# Patient Record
Sex: MALE | Race: WHITE | NOT HISPANIC OR LATINO | Employment: OTHER | URBAN - METROPOLITAN AREA
[De-identification: names, ages, dates, MRNs, and addresses within clinical notes are randomized per-mention and may not be internally consistent; named-entity substitution may affect disease eponyms.]

---

## 2017-03-08 ENCOUNTER — HOSPITAL ENCOUNTER (OUTPATIENT)
Dept: RADIOLOGY | Facility: MEDICAL CENTER | Age: 67
End: 2017-03-08
Attending: UROLOGY
Payer: MEDICARE

## 2017-03-08 DIAGNOSIS — N20.0 CALCULUS OF KIDNEY: ICD-10-CM

## 2017-03-08 PROCEDURE — 74176 CT ABD & PELVIS W/O CONTRAST: CPT

## 2017-06-08 ENCOUNTER — HOSPITAL ENCOUNTER (OUTPATIENT)
Dept: RADIOLOGY | Facility: MEDICAL CENTER | Age: 67
End: 2017-06-08
Attending: PHYSICIAN ASSISTANT
Payer: MEDICARE

## 2017-06-08 DIAGNOSIS — R31.0 GROSS HEMATURIA: ICD-10-CM

## 2017-06-08 PROCEDURE — 700117 HCHG RX CONTRAST REV CODE 255: Performed by: PHYSICIAN ASSISTANT

## 2017-06-08 PROCEDURE — 74178 CT ABD&PLV WO CNTR FLWD CNTR: CPT

## 2017-06-08 RX ADMIN — IOHEXOL 100 ML: 350 INJECTION, SOLUTION INTRAVENOUS at 16:13

## 2018-01-02 DIAGNOSIS — Z01.812 PRE-OPERATIVE LABORATORY EXAMINATION: ICD-10-CM

## 2018-01-02 DIAGNOSIS — Z01.810 PRE-OPERATIVE CARDIOVASCULAR EXAMINATION: ICD-10-CM

## 2018-01-02 LAB
ANION GAP SERPL CALC-SCNC: 8 MMOL/L (ref 0–11.9)
BUN SERPL-MCNC: 13 MG/DL (ref 8–22)
CALCIUM SERPL-MCNC: 9.3 MG/DL (ref 8.5–10.5)
CHLORIDE SERPL-SCNC: 106 MMOL/L (ref 96–112)
CO2 SERPL-SCNC: 24 MMOL/L (ref 20–33)
CREAT SERPL-MCNC: 1.04 MG/DL (ref 0.5–1.4)
EKG IMPRESSION: NORMAL
GFR SERPL CREATININE-BSD FRML MDRD: >60 ML/MIN/1.73 M 2
GLUCOSE SERPL-MCNC: 121 MG/DL (ref 65–99)
POTASSIUM SERPL-SCNC: 3.5 MMOL/L (ref 3.6–5.5)
SODIUM SERPL-SCNC: 138 MMOL/L (ref 135–145)

## 2018-01-02 PROCEDURE — 80048 BASIC METABOLIC PNL TOTAL CA: CPT

## 2018-01-02 PROCEDURE — 93010 ELECTROCARDIOGRAM REPORT: CPT | Performed by: INTERNAL MEDICINE

## 2018-01-02 PROCEDURE — 93005 ELECTROCARDIOGRAM TRACING: CPT

## 2018-01-02 PROCEDURE — 36415 COLL VENOUS BLD VENIPUNCTURE: CPT

## 2018-01-02 RX ORDER — MINERAL OIL AND WHITE PETROLATUM 30; 940 MG/G; MG/G
OINTMENT OPHTHALMIC
COMMUNITY
End: 2022-11-14

## 2018-01-02 RX ORDER — BRIMONIDINE TARTRATE AND TIMOLOL MALEATE 2; 5 MG/ML; MG/ML
1 SOLUTION OPHTHALMIC
COMMUNITY
End: 2022-11-14

## 2018-01-02 RX ORDER — SODIUM CHLORIDE 5 %
OINTMENT (GRAM) OPHTHALMIC (EYE) PRN
COMMUNITY
End: 2022-11-14

## 2018-01-30 ENCOUNTER — HOSPITAL ENCOUNTER (OUTPATIENT)
Facility: MEDICAL CENTER | Age: 68
End: 2018-01-30
Attending: OPHTHALMOLOGY | Admitting: OPHTHALMOLOGY
Payer: MEDICARE

## 2018-01-30 VITALS
HEIGHT: 68 IN | HEART RATE: 77 BPM | OXYGEN SATURATION: 93 % | SYSTOLIC BLOOD PRESSURE: 119 MMHG | DIASTOLIC BLOOD PRESSURE: 72 MMHG | RESPIRATION RATE: 20 BRPM | WEIGHT: 199.96 LBS | TEMPERATURE: 97.3 F | BODY MASS INDEX: 30.31 KG/M2

## 2018-01-30 PROCEDURE — A6410 STERILE EYE PAD: HCPCS | Performed by: OPHTHALMOLOGY

## 2018-01-30 PROCEDURE — 160035 HCHG PACU - 1ST 60 MINS PHASE I: Performed by: OPHTHALMOLOGY

## 2018-01-30 PROCEDURE — 160041 HCHG SURGERY MINUTES - EA ADDL 1 MIN LEVEL 4: Performed by: OPHTHALMOLOGY

## 2018-01-30 PROCEDURE — 700111 HCHG RX REV CODE 636 W/ 250 OVERRIDE (IP)

## 2018-01-30 PROCEDURE — 700101 HCHG RX REV CODE 250

## 2018-01-30 PROCEDURE — 501425 HCHG SPONGE, WECKCEL SPEAR: Performed by: OPHTHALMOLOGY

## 2018-01-30 PROCEDURE — 502000 HCHG MISC OR IMPLANTS RC 0278: Performed by: OPHTHALMOLOGY

## 2018-01-30 PROCEDURE — 87102 FUNGUS ISOLATION CULTURE: CPT

## 2018-01-30 PROCEDURE — 160029 HCHG SURGERY MINUTES - 1ST 30 MINS LEVEL 4: Performed by: OPHTHALMOLOGY

## 2018-01-30 PROCEDURE — 160036 HCHG PACU - EA ADDL 30 MINS PHASE I: Performed by: OPHTHALMOLOGY

## 2018-01-30 PROCEDURE — 160002 HCHG RECOVERY MINUTES (STAT): Performed by: OPHTHALMOLOGY

## 2018-01-30 PROCEDURE — 160048 HCHG OR STATISTICAL LEVEL 1-5: Performed by: OPHTHALMOLOGY

## 2018-01-30 PROCEDURE — 500558 HCHG EYE SHIELD W/GARTER (FOX): Performed by: OPHTHALMOLOGY

## 2018-01-30 PROCEDURE — 160009 HCHG ANES TIME/MIN: Performed by: OPHTHALMOLOGY

## 2018-01-30 RX ORDER — TETRACAINE HYDROCHLORIDE 5 MG/ML
SOLUTION OPHTHALMIC
Status: DISCONTINUED | OUTPATIENT
Start: 2018-01-30 | End: 2018-01-30 | Stop reason: HOSPADM

## 2018-01-30 RX ORDER — SODIUM CHLORIDE, SODIUM LACTATE, POTASSIUM CHLORIDE, CALCIUM CHLORIDE 600; 310; 30; 20 MG/100ML; MG/100ML; MG/100ML; MG/100ML
INJECTION, SOLUTION INTRAVENOUS CONTINUOUS
Status: DISCONTINUED | OUTPATIENT
Start: 2018-01-30 | End: 2018-01-30 | Stop reason: HOSPADM

## 2018-01-30 RX ORDER — MOXIFLOXACIN 5 MG/ML
SOLUTION/ DROPS OPHTHALMIC
Status: DISCONTINUED
Start: 2018-01-30 | End: 2018-01-30 | Stop reason: HOSPADM

## 2018-01-30 ASSESSMENT — PAIN SCALES - GENERAL
PAINLEVEL_OUTOF10: 0

## 2018-01-30 NOTE — DISCHARGE INSTRUCTIONS
ACTIVITY: Rest and take it easy for the first 24 hours.  A responsible adult is recommended to remain with you during that time.  It is normal to feel sleepy.  We encourage you to not do anything that requires balance, judgment or coordination.    MILD FLU-LIKE SYMPTOMS ARE NORMAL. YOU MAY EXPERIENCE GENERALIZED MUSCLE ACHES, THROAT IRRITATION, HEADACHE AND/OR SOME NAUSEA.    FOR 24 HOURS DO NOT:  Drive, operate machinery or run household appliances.  Drink beer or alcoholic beverages.   Make important decisions or sign legal documents.    SPECIAL INSTRUCTIONS: continue medications as previously ordered  No bending, straining, stooping, or lifting until  Approved by Dr Maxwell  Try to lie flat until post op appointment    DIET: To avoid nausea, slowly advance diet as tolerated, avoiding spicy or greasy foods for the first day.  Add more substantial food to your diet according to your physician's instructions.  Babies can be fed formula or breast milk as soon as they are hungry.  INCREASE FLUIDS AND FIBER TO AVOID CONSTIPATION.    SURGICAL DRESSING/BATHING: **keep patch on until post op appt*    FOLLOW-UP APPOINTMENT:  A follow-up appointment should be arranged with your doctor in *call for f/u**; call to schedule.    You should CALL YOUR PHYSICIAN if you develop:  Fever greater than 101 degrees F.  Pain not relieved by medication, or persistent nausea or vomiting.  Excessive bleeding (blood soaking through dressing) or unexpected drainage from the wound.  Extreme redness or swelling around the incision site, drainage of pus or foul smelling drainage.  Inability to urinate or empty your bladder within 8 hours.  Problems with breathing or chest pain.    You should call 911 if you develop problems with breathing or chest pain.  If you are unable to contact your doctor or surgical center, you should go to the nearest emergency room or urgent care center.  Physician's telephone #: *  Dr Maxwell [phone]**    If any  questions arise, call your doctor.  If your doctor is not available, please feel free to call the Surgical Center at [phone].  The Center is open Monday through Friday from 7AM to 7PM.  You can also call the HEALTH HOTLINE open 24 hours/day, 7 days/week and speak to a nurse at   [phone], or toll free at [phone].    A registered nurse may call you a few days after your surgery to see how you are doing after your procedure.    MEDICATIONS: Resume taking daily medication.  Take prescribed pain medication with food.  If no medication is prescribed, you may take non-aspirin pain medication if needed.  PAIN MEDICATION CAN BE VERY CONSTIPATING.  Take a stool softener or laxative such as senokot, pericolace, or milk of magnesia if needed.    Prescription given for *none**.  Last pain medication given at ***.    If your physician has prescribed pain medication that includes Acetaminophen (Tylenol), do not take additional Acetaminophen (Tylenol) while taking the prescribed medication.    Depression / Suicide Risk    As you are discharged from this [facility] facility, it is important to learn how to keep safe from harming yourself.    Recognize the warning signs:  · Abrupt changes in personality, positive or negative- including increase in energy   · Giving away possessions  · Change in eating patterns- significant weight changes-  positive or negative  · Change in sleeping patterns- unable to sleep or sleeping all the time   · Unwillingness or inability to communicate  · Depression  · Unusual sadness, discouragement and loneliness  · Talk of wanting to die  · Neglect of personal appearance   · Rebelliousness- reckless behavior  · Withdrawal from people/activities they love  · Confusion- inability to concentrate     If you or a loved one observes any of these behaviors or has concerns about self-harm, here's what you can do:  · Talk about it- your feelings and reasons for harming yourself  · Remove any  means that you might use to hurt yourself (examples: pills, rope, extension cords, firearm)  · Get professional help from the community (Mental Health, Substance Abuse, psychological counseling)  · Do not be alone:Call your Safe Contact- someone whom you trust who will be there for you.  · Call your local CRISIS HOTLINE 985-1660 or 719-635-5297  · Call your local Children's Mobile Crisis Response Team [place] (276) 876-2236 or www.WillCall  · Call the toll free National Suicide Prevention Hotlines   · National Suicide Prevention Lifeline 877-870-HFGR (8918)  · National Hope Line Network 800-SUICIDE (656-7598)

## 2018-01-30 NOTE — OR NURSING
1140 received from the OR, report form Dr Stafford, s/p stripping keratoplasty, asleep with OA, breathing unlabored & clear, right eye patched & shielded, pt laying supine for 60 mins,   1214 OA d/c'd, remains very sleepy  1230: pt remains asleep at this time,   1250: pt's hour to lay flat is up, pt sitting up in bed, remains very drowsy, given sips of water, tolerating well,   1315 iv d/cd'd, instructions given, home via w/c

## 2018-02-02 NOTE — OP REPORT
DATE OF SERVICE:  01/30/2018    PROCEDURE PERFORMED:  1.  Descemet stripping endothelial keratoplasty, right eye.  2.  Keratectomy, right eye.    PREOPERATIVE DIAGNOSES:+  1.  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  2.  Superficial corneal scarring or nodularity, right eye.    POSTOPERATIVE DIAGNOSES:  1.  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  2.  Superficial corneal scarring or nodularity, right eye.    INDICATIONS FOR SURGERY:  This is a patient with previous cataract and   glaucoma procedures with subsequent decompensation of his cornea with edema as   well as Salzmann's nodularity superiorly versus hypertrophic scar requiring   combined keratectomy with endothelial replacement.    ANESTHESIA:  General endotracheal anesthesia.    COMPLICATIONS:  None.    BLOOD LOSS:  Minimal.    SPECIMENS:  Donor corneal rim for fungal culture.    DESCRIPTION OF PROCEDURE:  Risks, benefits, alternatives and indications for   surgery were reviewed with the patient as well as his legal guardian and power   of , and informed consent was obtained by way of a 3-person phone   consult, direct communication with legal guardian.  Informed consent was   obtained, the patient was brought to the operating room where the right eye   was prepped and draped and a lid speculum was placed following administrating   anesthesia.  Two paracenteses were placed near the limbus and a crescent blade   was then used to smooth the superior cornea with a keratectomy to remove   hyperplastic tissue as best as possible without creating too much of a surface   irregularity.  After this, the anterior chamber was filled with Healon,   followed by a temporal clear corneal incision that was made.  A reverse   Sinskey hook was then used to remove the endothelium and Descemet at a   diameter of 8.0 mm and the Healon was removed from the anterior chamber.  An   inferior iridotomy was also created using a 30-gauge needle and a reverse   Sinskey  robe.  Attention was then brought to the donor tissue, which was a   presectioned tissue for [facility] surgery from the [facility].    This tissue was held on a suction punch and cut to a diameter of 8.0 mm and   then loaded into a mini-Busin glide, which was then introduced into the eye   and was brought in place with insertion forceps.  A high pressure air fill was   used to fixate the graft on the posterior stroma.  Two interrupted 10-0 nylon   sutures were placed temporally and the corneal wound and then a high-pressure   fill elapsed for 10 minutes after which a small air-fluid exchange was   performed and the patient's eye was gently prepped and draped and the patient   was discharged in stable condition to the postanesthesia care unit with plans   for followup with Dr. Maxwell the next day in the eye clinic.       ____________________________________     MD OTILIA Doan / LINUS    DD:  02/02/2018 07:58:05  DT:  02/02/2018 09:28:39    D#:  [id number]  Job#:  [id number]

## 2018-02-26 LAB
FUNGUS SPEC CULT: NORMAL
SIGNIFICANT IND 70042: NORMAL
SITE SITE: NORMAL
SOURCE SOURCE: NORMAL

## 2018-09-27 ENCOUNTER — HOSPITAL ENCOUNTER (OUTPATIENT)
Dept: RADIOLOGY | Facility: MEDICAL CENTER | Age: 68
End: 2018-09-27
Attending: PHYSICIAN ASSISTANT
Payer: MEDICARE

## 2018-09-27 DIAGNOSIS — N20.0 URIC ACID NEPHROLITHIASIS: ICD-10-CM

## 2018-09-27 PROCEDURE — 74018 RADEX ABDOMEN 1 VIEW: CPT

## 2019-04-04 ENCOUNTER — HOSPITAL ENCOUNTER (OUTPATIENT)
Dept: RADIOLOGY | Facility: MEDICAL CENTER | Age: 69
End: 2019-04-04
Attending: UROLOGY
Payer: MEDICARE

## 2019-04-04 DIAGNOSIS — Z87.442 PERSONAL HISTORY OF URINARY CALCULI: ICD-10-CM

## 2019-04-04 PROCEDURE — 74018 RADEX ABDOMEN 1 VIEW: CPT

## 2019-04-17 ENCOUNTER — HOSPITAL ENCOUNTER (OUTPATIENT)
Dept: LAB | Facility: MEDICAL CENTER | Age: 69
End: 2019-04-17
Attending: UROLOGY
Payer: MEDICARE

## 2019-04-17 LAB
APPEARANCE UR: CLEAR
BILIRUB UR QL STRIP.AUTO: NEGATIVE
COLOR UR: YELLOW
GLUCOSE UR STRIP.AUTO-MCNC: NEGATIVE MG/DL
KETONES UR STRIP.AUTO-MCNC: NEGATIVE MG/DL
LEUKOCYTE ESTERASE UR QL STRIP.AUTO: NEGATIVE
MICRO URNS: NORMAL
NITRITE UR QL STRIP.AUTO: NEGATIVE
PH UR STRIP.AUTO: 5.5 [PH]
PROT UR QL STRIP: NEGATIVE MG/DL
RBC UR QL AUTO: NEGATIVE
SP GR UR STRIP.AUTO: 1.03
UROBILINOGEN UR STRIP.AUTO-MCNC: 0.2 MG/DL

## 2019-04-17 PROCEDURE — 81003 URINALYSIS AUTO W/O SCOPE: CPT

## 2019-04-17 PROCEDURE — 87077 CULTURE AEROBIC IDENTIFY: CPT

## 2019-04-17 PROCEDURE — 87086 URINE CULTURE/COLONY COUNT: CPT

## 2019-04-17 PROCEDURE — 87186 SC STD MICRODIL/AGAR DIL: CPT

## 2019-04-20 LAB
BACTERIA UR CULT: ABNORMAL
BACTERIA UR CULT: ABNORMAL
SIGNIFICANT IND 70042: ABNORMAL
SITE SITE: ABNORMAL
SOURCE SOURCE: ABNORMAL

## 2021-06-20 PROBLEM — I10 ESSENTIAL HYPERTENSION: Status: ACTIVE | Noted: 2021-06-20

## 2021-06-20 PROBLEM — H02.844 SWELLING OF LEFT UPPER EYELID: Status: ACTIVE | Noted: 2021-06-20

## 2021-06-20 PROBLEM — G47.39 OTHER SLEEP APNEA: Status: ACTIVE | Noted: 2021-06-20

## 2021-06-20 PROBLEM — N40.1 BENIGN PROSTATIC HYPERPLASIA WITH LOWER URINARY TRACT SYMPTOMS: Status: ACTIVE | Noted: 2021-06-20

## 2021-06-20 PROBLEM — M1A.9XX0 CHRONIC GOUT: Status: ACTIVE | Noted: 2021-06-20

## 2021-06-20 PROBLEM — D69.6 THROMBOCYTOPENIA (HCC): Status: ACTIVE | Noted: 2021-06-20

## 2021-06-20 PROBLEM — R21 RASH: Status: ACTIVE | Noted: 2021-06-20

## 2021-06-20 PROBLEM — F32.1 MAJOR DEPRESSIVE DISORDER, SINGLE EPISODE, MODERATE (HCC): Status: ACTIVE | Noted: 2021-06-20

## 2021-06-20 PROBLEM — K64.9 HEMORRHOIDS: Status: ACTIVE | Noted: 2021-06-20

## 2021-06-20 PROBLEM — N18.30 CHRONIC KIDNEY DISEASE, STAGE 3 UNSPECIFIED: Status: ACTIVE | Noted: 2021-06-20

## 2021-06-20 PROBLEM — C92.10 CHRONIC MYELOCYTIC LEUKEMIA (HCC): Status: ACTIVE | Noted: 2021-06-20

## 2021-06-20 PROBLEM — E55.9 VITAMIN D DEFICIENCY: Status: ACTIVE | Noted: 2021-06-20

## 2021-06-20 PROBLEM — R26.81 GAIT INSTABILITY: Status: ACTIVE | Noted: 2021-06-20

## 2021-06-20 PROBLEM — Z86.0100 HISTORY OF COLONIC POLYPS: Status: ACTIVE | Noted: 2021-06-20

## 2021-06-20 PROBLEM — Z99.89 CPAP (CONTINUOUS POSITIVE AIRWAY PRESSURE) DEPENDENCE: Status: ACTIVE | Noted: 2021-06-20

## 2021-06-20 PROBLEM — Z86.010 HISTORY OF COLONIC POLYPS: Status: ACTIVE | Noted: 2021-06-20

## 2021-06-20 PROBLEM — F31.9 BIPOLAR 1 DISORDER (HCC): Status: ACTIVE | Noted: 2021-06-20

## 2021-06-25 ENCOUNTER — HOSPITAL ENCOUNTER (OUTPATIENT)
Dept: RADIOLOGY | Facility: MEDICAL CENTER | Age: 71
End: 2021-06-25
Attending: OPHTHALMOLOGY
Payer: MEDICARE

## 2021-06-25 DIAGNOSIS — D48.7 NEOPLASM OF UNCERTAIN BEHAVIOR OF OTHER SPECIFIED SITES: ICD-10-CM

## 2021-06-25 DIAGNOSIS — H02.844 EDEMA OF LEFT UPPER EYELID: ICD-10-CM

## 2021-06-25 PROCEDURE — 700117 HCHG RX CONTRAST REV CODE 255: Performed by: OPHTHALMOLOGY

## 2021-06-25 PROCEDURE — 70543 MRI ORBT/FAC/NCK W/O &W/DYE: CPT | Mod: MH

## 2021-06-25 PROCEDURE — 70553 MRI BRAIN STEM W/O & W/DYE: CPT | Mod: MH

## 2021-06-25 PROCEDURE — A9576 INJ PROHANCE MULTIPACK: HCPCS | Performed by: OPHTHALMOLOGY

## 2021-06-25 RX ADMIN — GADOTERIDOL 18 ML: 279.3 INJECTION, SOLUTION INTRAVENOUS at 14:25

## 2021-07-29 PROBLEM — E66.9 CLASS 1 OBESITY: Status: ACTIVE | Noted: 2021-07-29

## 2021-07-29 PROBLEM — E66.811 CLASS 1 OBESITY: Status: ACTIVE | Noted: 2021-07-29

## 2022-06-01 PROBLEM — R05.9 COUGH: Status: ACTIVE | Noted: 2022-06-01

## 2022-06-01 PROBLEM — J18.9 PNEUMONIA: Status: ACTIVE | Noted: 2022-06-01

## 2022-06-22 ENCOUNTER — OFFICE VISIT (OUTPATIENT)
Dept: SLEEP MEDICINE | Facility: MEDICAL CENTER | Age: 72
End: 2022-06-22
Payer: MEDICARE

## 2022-06-22 VITALS
BODY MASS INDEX: 30.46 KG/M2 | HEIGHT: 68 IN | WEIGHT: 201 LBS | DIASTOLIC BLOOD PRESSURE: 82 MMHG | SYSTOLIC BLOOD PRESSURE: 124 MMHG | HEART RATE: 70 BPM | OXYGEN SATURATION: 95 %

## 2022-06-22 DIAGNOSIS — J18.9 PNEUMONIA OF LEFT LUNG DUE TO INFECTIOUS ORGANISM, UNSPECIFIED PART OF LUNG: ICD-10-CM

## 2022-06-22 DIAGNOSIS — J90 PLEURAL EFFUSION: ICD-10-CM

## 2022-06-22 DIAGNOSIS — C92.10 CHRONIC MYELOCYTIC LEUKEMIA (HCC): ICD-10-CM

## 2022-06-22 PROCEDURE — 99205 OFFICE O/P NEW HI 60 MIN: CPT | Performed by: INTERNAL MEDICINE

## 2022-06-22 RX ORDER — BACITRACIN ZINC AND POLYMYXIN B SULFATE 500; 10000 [USP'U]/G; [USP'U]/G
OINTMENT TOPICAL
COMMUNITY
Start: 2022-05-19 | End: 2022-11-14

## 2022-06-22 RX ORDER — LOPERAMIDE HYDROCHLORIDE 2 MG/1
2 CAPSULE ORAL 4 TIMES DAILY PRN
COMMUNITY
End: 2024-02-22 | Stop reason: SDUPTHER

## 2022-06-22 NOTE — PROGRESS NOTES
"Pulmonary Clinic Note    Chief Complaint:  Chief Complaint   Patient presents with   • Establish Care     Referred by Kathleen Schmidt PA-C for Pleural Effusion on left/SOB/Cough   • Other     CXR 06/01/22     HPI:   Austin Woodard is a very pleasant 71 y.o. male never smoker.  He was referred to pulmonary clinic in June 2022 by his primary care manager for discovery of a large left-sided pleural effusion on chest x-ray.  Chest x-ray was as a follow-up from an initial chest x-ray in May 2022.  In May, he was treated for community-acquired pneumonia with Augmentin and azithromycin by his PCM.  There reportedly was a mention of a pleural effusion at that time.  A repeat chest x-ray in June was read as \"large pleural effusion\".  He is completely asymptomatic today.  He is saturating 95% on room air.  He is not complain of cough or shortness of breath.  He does not complain of fevers, chills, night sweats, weight changes.  No orthopnea or ankle edema.He has a history of CML and has been treated with Dasatinib since August 2021 by his oncologist Dr. Valdez.    He denies a history of working in settings with exposure to asbestos.  He has traveled around the world.  He did have a mom with a history of lung cancer but she was a smoker.  He is retired [place] .    Patient has not been seen by pulmonary in the past.  he has no prior PFTs  he has never been hospitalized for respiratory issues.       Past Medical History:   Diagnosis Date   • Bowel habit changes    • Glaucoma    • Hypertension    • Psychiatric problem     severe depression   • Renal disorder     hx of kidney stones   • Shortness of breath    • Unspecified cataract     IOL   • Urinary bladder disorder     kidney stones   • Urinary incontinence 01/02/2018    on occasion       Past Surgical History:   Procedure Laterality Date   • PENETRATING KERATOPLASTY Right 1/30/2018    Procedure: DESCEMETS STRIPPING ENDOTHELIAL KERATOPLASTY AND KERATECTOMY;  " Surgeon: Gabe Maxwell M.D.;  Location: SURGERY SAME DAY [facility];  Service: Ophthalmology   • BAERVELDT IMPLANT Right 9/4/2015    Procedure: BAERVELDT VALVE W/DONOR SCLERA PATCH GRAFT;  Surgeon: Demond Alonso M.D.;  Location: SURGERY SAME DAY [facility];  Service:    • CATARACT PHACO WITH IOL     • IMPLANT GLAUCOMA BAERVELDT      x 2   • OTHER      skin graft right heel   • OTHER ORTHOPEDIC SURGERY     • ROTATOR CUFF REPAIR         Social History     Socioeconomic History   • Marital status:      Spouse name: Not on file   • Number of children: Not on file   • Years of education: Not on file   • Highest education level: Not on file   Occupational History   • Not on file   Tobacco Use   • Smoking status: Never Smoker   • Smokeless tobacco: Never Used   Vaping Use   • Vaping Use: Never used   Substance and Sexual Activity   • Alcohol use: No   • Drug use: No   • Sexual activity: Not on file   Other Topics Concern   • Not on file   Social History Narrative   • Not on file     Social Determinants of Health     Financial Resource Strain: Not on file   Food Insecurity: Not on file   Transportation Needs: Not on file   Physical Activity: Not on file   Stress: Not on file   Social Connections: Not on file   Intimate Partner Violence: Not on file   Housing Stability: Not on file          History reviewed. No pertinent family history.    Current Outpatient Medications on File Prior to Visit   Medication Sig Dispense Refill   • Bacillus Coagulans-Inulin (PROBIOTIC FORMULA) 1-250 BILLION-MG Cap      • Lidocaine-Glycerin (PREPARATION H EX) Apply  topically.     • loperamide (IMODIUM) 2 MG Cap Take 2 mg by mouth 4 times a day as needed for Diarrhea.     • niacin 500 MG Tab TAKE 1 TABLET BY MOUTH ONCE DAILY (PM PER FACILITY) 30 Tablet 10   • docusate sodium (COLACE) 100 MG Cap TAKE 1 CAPSULE BY MOUTH ONCE DAILY 30 Capsule 10   • polyethylene glycol/lytes (MIRALAX) 17 g Pack Take 1 Packet by mouth 1 time a day  as needed (for no BM in 24 hours). 30 Each 5   • Dextromethorphan-guaiFENesin (TUSSIN DM)  MG/5ML Syrup Take 10 mL by mouth 3 times a day as needed (For cough or congestion). 236 mL 0   • loperamide (IMODIUM) 2 MG Cap May take 2 Capsules by mouth 1 time a day as needed for Diarrhea. May also take 1 Capsule 3 times a day as needed. Give 2 tablets for first episode of diarrhea, 1 tablet with each additional episode up to a total of 8mg daily. 30 Capsule 1   • allopurinol (ZYLOPRIM) 300 MG Tab Take 1 Tablet by mouth every day. 30 Tablet 5   • PHOSPHA 250 NEUTRAL 155-852-130 MG tablet Take 1 Tablet by mouth 2 times a day.     • Cholecalciferol (VITAMIN D) 2000 UNIT Tab TAKE 1 TABLET BY MOUTH ONCE DAILY (PM) (Patient taking differently: 50mcg) 30 Tablet 10   • chlorhexidine (PERIDEX) 0.12 % Solution SWISH AND SPIT 15MLS BY MOUTH TWICE DAILY 473 mL 10   • ARIPiprazole (ABILIFY) 10 MG Tab TAKE 1 TABLET BY MOUTH ONCE DAILY 30 Tablet 11   • tamsulosin (FLOMAX) 0.4 MG capsule TAKE 1 CAPSULE BY MOUTH ONCE DAILY (PM) 30 Capsule 10   • triamcinolone acetonide (KENALOG) 0.1 % Cream APPLY A SMALL AMOUNT TO AFFECTED AREA TWICE A DAY AS NEEDED FOR RASH. DO NOT EXCEED 14 DAYS 80 g 11   • aspirin (ASA) 81 MG Chew Tab chewable tablet Chew 1 Tablet every day. 100 Tablet 3   • Dasatinib (SPRYCEL) 100 MG Tab Take 1 tablet by mouth every day.     • Brimonidine Tartrate-Timolol 0.2-0.5 % Solution 1 Drop by Ophthalmic route 2 Times a Day. Each eye BID     • White Petrolatum-Mineral Oil (SYSTANE NIGHTTIME) Ointment by Ophthalmic route every bedtime.     • sodium chloride 5 % ophthalmic ointment Place  in both eyes as needed.     • artificial tears 1.4 % Solution Place 1 Drop in both eyes 4 times a day.     • mag hydrox-al hydrox-simeth (MAALOX PLUS ES OR MYLANTA DS) 400-400-40 MG/5ML Suspension Take 10 mL by mouth 4 times a day as needed.     • ondansetron (ZOFRAN ODT) 4 MG TABLET DISPERSIBLE Take 4 mg by mouth every 6 hours as needed  "for Nausea/Vomiting.     • L-Methylfolate 15 MG Tab Take 1 Capsule by mouth every day.     • latanoprost (XALATAN) 0.005 % Solution Administer 1 Drop into the right eye every evening.     • brimonidine (ALPHAGAN P) 0.1 % Solution 1 Drop 2 Times a Day.     • dorzolamide (TRUSOPT) 2 % Solution Administer 1 Drop into the right eye 2 times a day.       No current facility-administered medications on file prior to visit.       Allergies: Celexa and Zoloft      ROS: A 12 point ROS was performed on intake and during my interview. ROS negative unless specifically noted in HPI.     Vitals:  /82 (BP Location: Left arm, Patient Position: Sitting, BP Cuff Size: Adult)   Pulse 70   Ht 1.727 m (5' 8\")   Wt 91.2 kg (201 lb)   SpO2 95%     Physical Exam:  Physical Exam  Constitutional:       General: He is not in acute distress.     Appearance: He is not ill-appearing, toxic-appearing or diaphoretic.   HENT:      Head: Normocephalic.   Eyes:      Conjunctiva/sclera:      Right eye: Right conjunctiva is injected.      Left eye: Left conjunctiva is injected.      Pupils: Pupils are equal, round, and reactive to light.   Cardiovascular:      Rate and Rhythm: Normal rate and regular rhythm.      Heart sounds:    No systolic murmur is present.  Pulmonary:      Effort: Pulmonary effort is normal. No respiratory distress.      Breath sounds: Normal breath sounds. No wheezing, rhonchi or rales.       Musculoskeletal:      Right lower leg: No edema.      Left lower leg: No edema.   Neurological:      Mental Status: He is alert.   Psychiatric:         Mood and Affect: Affect is flat.         Behavior: Behavior is slowed. Behavior is cooperative.         Thought Content: Thought content normal.         Judgment: Judgment normal.         Laboratory Data: I personally reviewed labs including historical lab trends.       PFTs as reviewed by me personally show:    Imaging as reviewed by me personally show:  " "    Assessment/Plan:    Problem List Items Addressed This Visit     Chronic myelocytic leukemia (HCC)    Pneumonia    Pleural effusion        Plan/recommendations:  Unfortunately, I am unable to see a report or an image for the reported May chest x-ray.  I am able to see a report from the June x-ray but unable to personally review the film.  Given that the effusion was described as \"large\" it would be preferable that we are able to access these images.  This is especially important since there was apparently a report that perhaps the effusion got larger from May to June.      I discussed this with Mr. Woodard, his daughter (via phone), Lisa ([facility] services), and his caregiver. The differential diagnosis is broad but the most likely etiologies are Dasatanib-related (incidence of 5-28% per UpToDate), malignancy, autoimmune.     I doubt heart failure as it is described as unilateral and he does not have orthopnea or leg edema.  I doubt infectious as he is nontoxic-appearing and asymptomatic.      The only way to really narrow down the differential would be to do a thoracentesis and send fluid for studies.  I went over this option with everyone.  The other option would be to discuss Dasatinib with his oncologist and consider taking him off this drug for 8 to 12 weeks and repeating a chest x-ray to see if the effusion resolves.  If the effusion resolves this is very likely the culprit. Mr. Woodard stated that his preference is to stop the Dasatinib and see if the effusion gets better.  He stated he would not like to get a thoracentesis at this time.  I do think this is a reasonable option.    I have asked his caregivers to obtain copies of the images from the chest x-rays and to have them uploaded into [facility]'s radiology system.  We will also have my office ask for those copies.  I explained that I will be traveling next week so if they are able to get the images this week I will be able to call them back.  If I am " unable to call them back, they can ask for one of the covering providers in our clinic to review the images and discussed them with Mr. Woodard and his caregivers.       Return in about 3 months (around 9/22/2022). After repeat CXR. Sooner if symptomatic.    __________  Eliceo Harden, DO  Pulmonary and Critical Care Medicine  [facility]    Please note that this dictation was created using voice recognition software. The accuracy of the dictation is limited to the abilities of the software. I have made every reasonable attempt to correct obvious errors, but I expect that there are errors of grammar and possibly content that I did not discover before finalizing the note.

## 2022-06-22 NOTE — PATIENT INSTRUCTIONS
"Please bring CDs with Mr. Woodard's May and June chest Xrays to the [place] radiology department to be uploaded.  2.   Please notify our clinic when the images have been uploaded so that Dr. Harden or one of the other pulmonary providers can review the films and get back to you about the findings.   3.   Please ask Dr. Valdez (oncology) to consider stopping the Desatinib (Sprycel) as this has an incidence of pleural effusion of 5-28%. If this is stopped, I would recommend repeat chest xray within 8-12 weeks to see if the effusion has resolved on its own.   4.   If the effusion does not resolve on it's own or if Mr. Woodard becomes symptomatic (shortness of breath, cough, etc) he would need a referral to interventional radiology for a thoracentesis (\"tap\" of the fluid).   "

## 2022-08-05 ENCOUNTER — HOSPITAL ENCOUNTER (OUTPATIENT)
Dept: RADIOLOGY | Facility: MEDICAL CENTER | Age: 72
End: 2022-08-05
Attending: INTERNAL MEDICINE
Payer: MEDICARE

## 2022-08-05 DIAGNOSIS — D69.6 THROMBOCYTOPENIA, UNSPECIFIED (HCC): ICD-10-CM

## 2022-08-05 DIAGNOSIS — C93.10 CHRONIC MYELOMONOCYTIC LEUKEMIA NOT HAVING ACHIEVED REMISSION (HCC): ICD-10-CM

## 2022-08-05 DIAGNOSIS — D75.1 SECONDARY POLYCYTHEMIA: ICD-10-CM

## 2022-08-05 DIAGNOSIS — Z79.899 LONG TERM USE OF DRUG: ICD-10-CM

## 2022-08-05 DIAGNOSIS — D72.829 LEUKOCYTOSIS, UNSPECIFIED TYPE: ICD-10-CM

## 2022-08-05 PROCEDURE — 71046 X-RAY EXAM CHEST 2 VIEWS: CPT

## 2022-10-05 ENCOUNTER — HOSPITAL ENCOUNTER (OUTPATIENT)
Dept: RADIOLOGY | Facility: MEDICAL CENTER | Age: 72
End: 2022-10-05
Attending: INTERNAL MEDICINE
Payer: MEDICARE

## 2022-10-05 DIAGNOSIS — Z79.899 NEED FOR PROPHYLACTIC CHEMOTHERAPY: ICD-10-CM

## 2022-10-05 DIAGNOSIS — D75.1 POLYCYTHEMIA, SECONDARY: ICD-10-CM

## 2022-10-05 DIAGNOSIS — D72.10 EOSINOPHILIA, UNSPECIFIED TYPE: ICD-10-CM

## 2022-10-05 DIAGNOSIS — D69.6 THROMBOCYTOPENIA, UNSPECIFIED (HCC): ICD-10-CM

## 2022-10-05 DIAGNOSIS — C93.10 CHRONIC MONOCYTOID LEUKEMIA (HCC): ICD-10-CM

## 2022-10-05 PROCEDURE — 71046 X-RAY EXAM CHEST 2 VIEWS: CPT

## 2022-11-10 ENCOUNTER — PATIENT MESSAGE (OUTPATIENT)
Dept: HEALTH INFORMATION MANAGEMENT | Facility: OTHER | Age: 72
End: 2022-11-10

## 2023-01-12 ENCOUNTER — APPOINTMENT (OUTPATIENT)
Dept: RADIOLOGY | Facility: MEDICAL CENTER | Age: 73
End: 2023-01-12
Attending: INTERNAL MEDICINE
Payer: MEDICARE

## 2023-01-12 DIAGNOSIS — Z79.899 NEED FOR PROPHYLACTIC CHEMOTHERAPY: ICD-10-CM

## 2023-01-12 DIAGNOSIS — D69.6 THROMBOCYTOPENIA, UNSPECIFIED (HCC): ICD-10-CM

## 2023-01-12 DIAGNOSIS — C93.10 CHRONIC MONOCYTOID LEUKEMIA (HCC): ICD-10-CM

## 2023-01-12 DIAGNOSIS — D72.10 EOSINOPHILIA, UNSPECIFIED TYPE: ICD-10-CM

## 2023-01-12 DIAGNOSIS — D75.1 POLYCYTHEMIA, SECONDARY: ICD-10-CM

## 2023-01-12 PROCEDURE — 71046 X-RAY EXAM CHEST 2 VIEWS: CPT

## 2023-10-05 ENCOUNTER — HOSPITAL ENCOUNTER (OUTPATIENT)
Dept: RADIOLOGY | Facility: MEDICAL CENTER | Age: 73
End: 2023-10-05
Attending: INTERNAL MEDICINE
Payer: MEDICARE

## 2023-10-05 DIAGNOSIS — D75.1 POLYCYTHEMIA, SECONDARY: ICD-10-CM

## 2023-10-05 DIAGNOSIS — C93.10 CHRONIC MONOCYTOID LEUKEMIA (HCC): ICD-10-CM

## 2023-10-05 DIAGNOSIS — Z79.899 LONG TERM USE OF DRUG: ICD-10-CM

## 2023-10-05 DIAGNOSIS — D69.6 THROMBOCYTOPENIA, UNSPECIFIED (HCC): ICD-10-CM

## 2023-10-05 DIAGNOSIS — D72.10 EOSINOPHILIA, UNSPECIFIED TYPE: ICD-10-CM

## 2023-10-05 PROCEDURE — 71046 X-RAY EXAM CHEST 2 VIEWS: CPT

## 2023-10-15 PROBLEM — G47.00 INSOMNIA: Status: ACTIVE | Noted: 2023-10-15

## 2023-10-16 PROBLEM — R32 URINARY INCONTINENCE: Status: ACTIVE | Noted: 2023-10-16

## 2023-10-23 ENCOUNTER — HOSPITAL ENCOUNTER (EMERGENCY)
Facility: MEDICAL CENTER | Age: 73
End: 2023-10-23
Attending: STUDENT IN AN ORGANIZED HEALTH CARE EDUCATION/TRAINING PROGRAM
Payer: MEDICARE

## 2023-10-23 ENCOUNTER — APPOINTMENT (OUTPATIENT)
Dept: RADIOLOGY | Facility: MEDICAL CENTER | Age: 73
End: 2023-10-23
Attending: STUDENT IN AN ORGANIZED HEALTH CARE EDUCATION/TRAINING PROGRAM
Payer: MEDICARE

## 2023-10-23 VITALS
RESPIRATION RATE: 15 BRPM | TEMPERATURE: 98 F | SYSTOLIC BLOOD PRESSURE: 145 MMHG | OXYGEN SATURATION: 93 % | HEART RATE: 64 BPM | DIASTOLIC BLOOD PRESSURE: 83 MMHG | BODY MASS INDEX: 33.04 KG/M2 | HEIGHT: 68 IN | WEIGHT: 218 LBS
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
## 2023-10-23 DIAGNOSIS — J18.9 PNEUMONIA OF LEFT LUNG DUE TO INFECTIOUS ORGANISM, UNSPECIFIED PART OF LUNG: ICD-10-CM

## 2023-10-23 DIAGNOSIS — S01.01XA LACERATION OF SCALP, INITIAL ENCOUNTER: ICD-10-CM

## 2023-10-23 LAB
ALBUMIN SERPL BCP-MCNC: 4.3 G/DL (ref 3.2–4.9)
ALBUMIN/GLOB SERPL: 1.2 G/DL
ALP SERPL-CCNC: 95 U/L (ref 30–99)
ALT SERPL-CCNC: 21 U/L (ref 2–50)
ANION GAP SERPL CALC-SCNC: 10 MMOL/L (ref 7–16)
AST SERPL-CCNC: 25 U/L (ref 12–45)
BASOPHILS # BLD AUTO: 0.4 % (ref 0–1.8)
BASOPHILS # BLD: 0.03 K/UL (ref 0–0.12)
BILIRUB SERPL-MCNC: 0.9 MG/DL (ref 0.1–1.5)
BUN SERPL-MCNC: 18 MG/DL (ref 8–22)
CALCIUM ALBUM COR SERPL-MCNC: 9.2 MG/DL (ref 8.5–10.5)
CALCIUM SERPL-MCNC: 9.4 MG/DL (ref 8.5–10.5)
CHLORIDE SERPL-SCNC: 105 MMOL/L (ref 96–112)
CO2 SERPL-SCNC: 26 MMOL/L (ref 20–33)
CREAT SERPL-MCNC: 1.31 MG/DL (ref 0.5–1.4)
EKG IMPRESSION: NORMAL
EOSINOPHIL # BLD AUTO: 0.32 K/UL (ref 0–0.51)
EOSINOPHIL NFR BLD: 4 % (ref 0–6.9)
ERYTHROCYTE [DISTWIDTH] IN BLOOD BY AUTOMATED COUNT: 53.5 FL (ref 35.9–50)
ETHANOL BLD-MCNC: <10.1 MG/DL
FLUAV RNA SPEC QL NAA+PROBE: NEGATIVE
FLUBV RNA SPEC QL NAA+PROBE: NEGATIVE
GFR SERPLBLD CREATININE-BSD FMLA CKD-EPI: 58 ML/MIN/1.73 M 2
GLOBULIN SER CALC-MCNC: 3.5 G/DL (ref 1.9–3.5)
GLUCOSE SERPL-MCNC: 116 MG/DL (ref 65–99)
HCT VFR BLD AUTO: 48.5 % (ref 42–52)
HGB BLD-MCNC: 16.2 G/DL (ref 14–18)
IMM GRANULOCYTES # BLD AUTO: 0.04 K/UL (ref 0–0.11)
IMM GRANULOCYTES NFR BLD AUTO: 0.5 % (ref 0–0.9)
LYMPHOCYTES # BLD AUTO: 2.47 K/UL (ref 1–4.8)
LYMPHOCYTES NFR BLD: 31 % (ref 22–41)
MCH RBC QN AUTO: 31.1 PG (ref 27–33)
MCHC RBC AUTO-ENTMCNC: 33.4 G/DL (ref 32.3–36.5)
MCV RBC AUTO: 93.1 FL (ref 81.4–97.8)
MONOCYTES # BLD AUTO: 0.98 K/UL (ref 0–0.85)
MONOCYTES NFR BLD AUTO: 12.3 % (ref 0–13.4)
NEUTROPHILS # BLD AUTO: 4.14 K/UL (ref 1.82–7.42)
NEUTROPHILS NFR BLD: 51.8 % (ref 44–72)
NRBC # BLD AUTO: 0 K/UL
NRBC BLD-RTO: 0 /100 WBC (ref 0–0.2)
NT-PROBNP SERPL IA-MCNC: 159 PG/ML (ref 0–125)
PLATELET # BLD AUTO: 161 K/UL (ref 164–446)
PMV BLD AUTO: 8.9 FL (ref 9–12.9)
POTASSIUM SERPL-SCNC: 4.2 MMOL/L (ref 3.6–5.5)
PROT SERPL-MCNC: 7.8 G/DL (ref 6–8.2)
RBC # BLD AUTO: 5.21 M/UL (ref 4.7–6.1)
RSV RNA SPEC QL NAA+PROBE: NEGATIVE
SARS-COV-2 RNA RESP QL NAA+PROBE: NOTDETECTED
SODIUM SERPL-SCNC: 141 MMOL/L (ref 135–145)
SPECIMEN SOURCE: NORMAL
TROPONIN T SERPL-MCNC: 27 NG/L (ref 6–19)
WBC # BLD AUTO: 8 K/UL (ref 4.8–10.8)

## 2023-10-23 PROCEDURE — 83880 ASSAY OF NATRIURETIC PEPTIDE: CPT

## 2023-10-23 PROCEDURE — 305308 HCHG STAPLER,SKIN,DISP.

## 2023-10-23 PROCEDURE — 72125 CT NECK SPINE W/O DYE: CPT

## 2023-10-23 PROCEDURE — 71045 X-RAY EXAM CHEST 1 VIEW: CPT

## 2023-10-23 PROCEDURE — 700102 HCHG RX REV CODE 250 W/ 637 OVERRIDE(OP): Performed by: STUDENT IN AN ORGANIZED HEALTH CARE EDUCATION/TRAINING PROGRAM

## 2023-10-23 PROCEDURE — 82077 ASSAY SPEC XCP UR&BREATH IA: CPT

## 2023-10-23 PROCEDURE — 304999 HCHG REPAIR-SIMPLE/INTERMED LEVEL 1

## 2023-10-23 PROCEDURE — A9270 NON-COVERED ITEM OR SERVICE: HCPCS | Performed by: STUDENT IN AN ORGANIZED HEALTH CARE EDUCATION/TRAINING PROGRAM

## 2023-10-23 PROCEDURE — 304217 HCHG IRRIGATION SYSTEM

## 2023-10-23 PROCEDURE — 700111 HCHG RX REV CODE 636 W/ 250 OVERRIDE (IP): Performed by: STUDENT IN AN ORGANIZED HEALTH CARE EDUCATION/TRAINING PROGRAM

## 2023-10-23 PROCEDURE — 70450 CT HEAD/BRAIN W/O DYE: CPT

## 2023-10-23 PROCEDURE — 80053 COMPREHEN METABOLIC PANEL: CPT

## 2023-10-23 PROCEDURE — 99285 EMERGENCY DEPT VISIT HI MDM: CPT

## 2023-10-23 PROCEDURE — 93005 ELECTROCARDIOGRAM TRACING: CPT | Performed by: STUDENT IN AN ORGANIZED HEALTH CARE EDUCATION/TRAINING PROGRAM

## 2023-10-23 PROCEDURE — 85025 COMPLETE CBC W/AUTO DIFF WBC: CPT

## 2023-10-23 PROCEDURE — C9803 HOPD COVID-19 SPEC COLLECT: HCPCS | Performed by: STUDENT IN AN ORGANIZED HEALTH CARE EDUCATION/TRAINING PROGRAM

## 2023-10-23 PROCEDURE — 0241U HCHG SARS-COV-2 COVID-19 NFCT DS RESP RNA 4 TRGT MIC: CPT

## 2023-10-23 PROCEDURE — 84484 ASSAY OF TROPONIN QUANT: CPT

## 2023-10-23 PROCEDURE — 700105 HCHG RX REV CODE 258: Performed by: STUDENT IN AN ORGANIZED HEALTH CARE EDUCATION/TRAINING PROGRAM

## 2023-10-23 RX ORDER — DOXYCYCLINE 100 MG/1
100 CAPSULE ORAL 2 TIMES DAILY
Qty: 10 CAPSULE | Refills: 0 | Status: ACTIVE | OUTPATIENT
Start: 2023-10-23 | End: 2023-10-28

## 2023-10-23 RX ORDER — SODIUM CHLORIDE, SODIUM LACTATE, POTASSIUM CHLORIDE, CALCIUM CHLORIDE 600; 310; 30; 20 MG/100ML; MG/100ML; MG/100ML; MG/100ML
1000 INJECTION, SOLUTION INTRAVENOUS ONCE
Status: COMPLETED | OUTPATIENT
Start: 2023-10-23 | End: 2023-10-23

## 2023-10-23 RX ORDER — AMOXICILLIN AND CLAVULANATE POTASSIUM 875; 125 MG/1; MG/1
1 TABLET, FILM COATED ORAL 2 TIMES DAILY
Qty: 10 TABLET | Refills: 0 | Status: ACTIVE | OUTPATIENT
Start: 2023-10-23 | End: 2023-10-28

## 2023-10-23 RX ORDER — AMOXICILLIN AND CLAVULANATE POTASSIUM 875; 125 MG/1; MG/1
1 TABLET, FILM COATED ORAL ONCE
Status: COMPLETED | OUTPATIENT
Start: 2023-10-23 | End: 2023-10-23

## 2023-10-23 RX ORDER — DOXYCYCLINE 100 MG/1
100 TABLET ORAL ONCE
Status: COMPLETED | OUTPATIENT
Start: 2023-10-23 | End: 2023-10-23

## 2023-10-23 RX ORDER — DOXYCYCLINE 100 MG/1
100 CAPSULE ORAL 2 TIMES DAILY
Qty: 14 CAPSULE | Refills: 0 | Status: ACTIVE | OUTPATIENT
Start: 2023-10-23 | End: 2023-10-23 | Stop reason: SDUPTHER

## 2023-10-23 RX ADMIN — SODIUM CHLORIDE, POTASSIUM CHLORIDE, SODIUM LACTATE AND CALCIUM CHLORIDE 1000 ML: 600; 310; 30; 20 INJECTION, SOLUTION INTRAVENOUS at 03:46

## 2023-10-23 RX ADMIN — DOXYCYCLINE 100 MG: 100 TABLET, FILM COATED ORAL at 06:32

## 2023-10-23 RX ADMIN — AMOXICILLIN AND CLAVULANATE POTASSIUM 1 TABLET: 875; 125 TABLET, FILM COATED ORAL at 06:32

## 2023-10-23 RX ADMIN — LIDOCAINE HYDROCHLORIDE 10 ML: 10 INJECTION, SOLUTION EPIDURAL; INFILTRATION; INTRACAUDAL at 04:25

## 2023-10-23 NOTE — ED NOTES
Pt resting comfortably on gurney, laying supine. Even chest rise and fall visualized. Continuous monitoring in place. Call light within reach.

## 2023-10-23 NOTE — ED NOTES
Patient discharged home per ERP.  Discharge teaching and education discussed with patient and group home care giver. POC discussed.   Patient and care giver verbalized understanding of discharge teaching and education. No other questions at this time.     RX x 2 given to patient.   PIV removed.     VSS. Pt assisted to group home vehicle.

## 2023-10-23 NOTE — ED TRIAGE NOTES
Chief Complaint   Patient presents with    Head Injury     Pt trip down while going to the bathroom few minutes ago and hit his head on the doorknob  He sustained lacerated wound at parietal area  Alert and Oriented x 4  GCS: 15/15  Pt on Blood Thinners (Aspirin)  Denied any loss of consciousness  TBI activated  Seen by ERP and sent to CT scan  Transferred to [facility] and endorsed to the assigned RN     Glucose en route: 109 mg/dL  Brought by EMS from home with the above complaints    Pt is alert and oriented x 4, speaking in full sentences, follows commands and responds appropriately to questions.     Respirations are even and unlabored.      Vitals:    10/23/23 0238   BP: 106/73   Pulse: 75   Resp: 18   Temp: 36.4 °C (97.5 °F)   SpO2: 95%     Past Medical History:   Diagnosis Date    Bowel habit changes     Glaucoma     Hypertension     Psychiatric problem     severe depression    Renal disorder     hx of kidney stones    Shortness of breath     Unspecified cataract     IOL    Urinary bladder disorder     kidney stones    Urinary incontinence 01/02/2018    on occasion     Past Surgical History:   Procedure Laterality Date    PENETRATING KERATOPLASTY Right 1/30/2018    Procedure: DESCEMETS STRIPPING ENDOTHELIAL KERATOPLASTY AND KERATECTOMY;  Surgeon: Gabe Maxwell M.D.;  Location: SURGERY SAME DAY [facility];  Service: Ophthalmology    BAERVELDT IMPLANT Right 9/4/2015    Procedure: BAERVELDT VALVE W/DONOR SCLERA PATCH GRAFT;  Surgeon: Demond Alonso M.D.;  Location: SURGERY SAME DAY [facility] ORS;  Service:     CATARACT PHACO WITH IOL      IMPLANT GLAUCOMA BAERVELDT      x 2    OTHER      skin graft right heel    OTHER ORTHOPEDIC SURGERY      ROTATOR CUFF REPAIR

## 2023-10-23 NOTE — ED NOTES
Report to Khushboo JUAREZ. Pt on hilario in lowest, locked position, on room air. Continuous monitoring in place. Call light within reach.

## 2023-10-23 NOTE — DISCHARGE PLANNING
ER  Note:  Received call from Lj from [facility] requesting doxycycline order clarification. Discussed with ERP. Dr. Templeton sent updated prescription. RN LISETH called back Bill and informed that electronic order has been sent by ERP.

## 2023-10-23 NOTE — ED NOTES
Group home will come and pick patient up from the hospital at 7am by Steve Polanco (hospital staff- [phone]) daughter Kristine - [phone] (will call group home about the patient's d/c

## 2023-10-24 NOTE — ED PROVIDER NOTES
CHIEF COMPLAINT  Chief Complaint   Patient presents with    Head Injury     Pt trip down while going to the bathroom few minutes ago and hit his head on the doorknob  He sustained lacerated wound at parietal area  Alert and Oriented x 4  GCS: 15/15  Pt on Blood Thinners (Aspirin)  Denied any loss of consciousness  TBI activated  Seen by ERP and sent to CT scan  Transferred to [facility] and endorsed to the assigned RN       LIMITATION TO HISTORY   Select:     HPI    Austin Woodard is a 73 y.o. male who presents to the Emergency Department for evaluation of a ground-level fall that occurred just prior to arrival patient reports he tripped over some shoes striking his head on the doorknob he is unsure if he lost consciousness patient is on aspirin    OUTSIDE HISTORIAN(S):  Select:    EXTERNAL RECORDS REVIEWED  Select: Other       PAST MEDICAL HISTORY  Past Medical History:   Diagnosis Date    Bowel habit changes     Glaucoma     Hypertension     Psychiatric problem     severe depression    Renal disorder     hx of kidney stones    Shortness of breath     Unspecified cataract     IOL    Urinary bladder disorder     kidney stones    Urinary incontinence 01/02/2018    on occasion     .    SURGICAL HISTORY  Past Surgical History:   Procedure Laterality Date    PENETRATING KERATOPLASTY Right 1/30/2018    Procedure: DESCEMETS STRIPPING ENDOTHELIAL KERATOPLASTY AND KERATECTOMY;  Surgeon: Gabe Maxwell M.D.;  Location: SURGERY SAME DAY [facility] ORS;  Service: Ophthalmology    BAERVELDT IMPLANT Right 9/4/2015    Procedure: BAERVELDT VALVE W/DONOR SCLERA PATCH GRAFT;  Surgeon: Demond Alonso M.D.;  Location: SURGERY SAME DAY [facility] ORS;  Service:     CATARACT PHACO WITH IOL      IMPLANT GLAUCOMA BAERVELDT      x 2    OTHER      skin graft right heel    OTHER ORTHOPEDIC SURGERY      ROTATOR CUFF REPAIR           FAMILY HISTORY  History reviewed. No pertinent family history.       SOCIAL HISTORY  Social History      Socioeconomic History    Marital status:      Spouse name: Not on file    Number of children: Not on file    Years of education: Not on file    Highest education level: Not on file   Occupational History    Not on file   Tobacco Use    Smoking status: Never    Smokeless tobacco: Never   Vaping Use    Vaping Use: Never used   Substance and Sexual Activity    Alcohol use: No    Drug use: No    Sexual activity: Not on file   Other Topics Concern    Not on file   Social History Narrative    Not on file     Social Determinants of Health     Financial Resource Strain: Not on file   Food Insecurity: Not on file   Transportation Needs: Not on file   Physical Activity: Not on file   Stress: Not on file   Social Connections: Not on file   Intimate Partner Violence: Not on file   Housing Stability: Not on file         CURRENT MEDICATIONS  No current facility-administered medications on file prior to encounter.     Current Outpatient Medications on File Prior to Encounter   Medication Sig Dispense Refill    triamcinolone acetonide (KENALOG) 0.1 % Cream APPLY A SMALL AMOUNT TO AFFECTED AREA TWICE A DAY AS NEEDED FOR RASH. DO NOT EXCEED 14 DAYS 80 g 11    niacin 500 MG Tab TAKE 1 TABLET BY MOUTH ONCE DAILY (PM PER FACILITY) 30 Tablet 10    docusate sodium (COLACE) 100 MG Cap TAKE 1 CAPSULE BY MOUTH ONCE DAILY 30 Capsule 11    PHOSPHA 250 NEUTRAL 155-852-130 MG tablet TAKE 1 TABLET BY MOUTH TWICE DAILY 60 Tablet 10    Cholecalciferol (VITAMIN D) 2000 UNIT Tab TAKE 1 TABLET BY MOUTH ONCE DAILY (PM) 30 Tablet 10    ARIPiprazole (ABILIFY) 10 MG Tab TAKE 1 TABLET BY MOUTH ONCE DAILY 30 Tablet 10    dorzolamide-timolol (COSOPT) 22.3-6.8 MG/ML Solution Administer 1 Drop into the right eye 2 times a day.      tamsulosin (FLOMAX) 0.4 MG capsule TAKE 1 CAPSULE BY MOUTH ONCE DAILY (PM) 30 Capsule 11    ASPIRIN LOW DOSE 81 MG Chew Tab chewable tablet CHEW 1 TABLET BY MOUTH ONCE DAILY 30 Tablet 10    chlorhexidine (PERIDEX) 0.12 %  "Solution SWISH AND SPIT 15MLS BY MOUTH TWICE DAILY 473 mL 11    allopurinol (ZYLOPRIM) 300 MG Tab TAKE 1 TABLET BY MOUTH EVERY DAY. 30 Tablet 11    dasatinib (SPRYCEL) 50 MG tablet Take 50 mg by mouth every day.      ROBAFEN DM COUGH  MG/5ML Liquid soln TAKE 10MLS BY MOUTH THREE TIMES A DAY  AS NEEDED FOR COUGH OR CONGESTION 237 mL 11    Lidocaine-Glycerin (PREPARATION H EX) Apply  topically 2 times a day as needed.      loperamide (IMODIUM) 2 MG Cap Take 2 mg by mouth 4 times a day as needed for Diarrhea.      polyethylene glycol/lytes (MIRALAX) 17 g Pack Take 1 Packet by mouth 1 time a day as needed (for no BM in 24 hours). 30 Each 5    artificial tears 1.4 % Solution Administer 1 Drop into both eyes as needed.      latanoprost (XALATAN) 0.005 % Solution Administer 1 Drop into the right eye every evening.             ALLERGIES  Allergies   Allergen Reactions    Celexa Hives     And paranoia     Zoloft Hives     paranoia       PHYSICAL EXAM  VITAL SIGNS:BP (!) 145/83   Pulse 64   Temp 36.7 °C (98 °F) (Temporal)   Resp 15   Ht 1.727 m (5' 8\")   Wt 98.9 kg (218 lb)   SpO2 93%   BMI 33.15 kg/m²       GENERAL: Awake and alert  HEAD: Normocephalic and there is dried blood on the face there is a linear 7cm laceration on the scalp extending from the vertex of the head down onto the forehead, no exposed bone or galea  NECK: Normal range of motion, without meningismus  EYES: Pupils Equal, Round, Reactive to Light, extraocular movements intact, conjunctiva white  ENT: Mucous membranes moist, oropharynx clear  PULMONARY: Normal effort, clear to auscultation  CARDIOVASCULAR: No murmurs, clicks or rubs, peripheral pulses 2+  ABDOMINAL: Soft, non-tender, no guarding or rigidity present, no pulsatile masses  BACK: no midline tenderness, no costovertebral tenderness  NEUROLOGICAL: Grossly non-focal neurological examination, speech normal, gait normal  EXTREMITIES: No edema, normal to inspection  SKIN: Warm and " dry.  PSYCHIATRIC: Affect is appropriate    DIAGNOSTIC STUDIES / PROCEDURES  EKG  Laceration Repair Procedure Note    Indication: Laceration    Procedure: The patient was placed in the appropriate position and anesthesia around the laceration was obtained by infiltration using Lidocaine with 5-10 cc's of 1% epinephrine    The wound was highly contaminated .The area was then irrigated with normal saline.     The laceration was closed with 14 staples      Total repaired wound length: 7 cm.     Complications: None EKG Interpretati    on: I independently reviewed the below EKG and did not see signs of a STEMI  Results for orders placed or performed during the hospital encounter of 10/23/23   EKG   Result Value Ref Range    Report       [facility] Emergency Dept.    Test Date:  2023-10-23  Pt Name:    DANELLE CLEVELAND                    Department: ER  MRN:        [id number]                      Room:        17  Gender:     Male                         Technician: [id number]  :        1950                   Requested By:ORLY MOSQUEDA  Order #:    [id number]                    Reading MD:    Measurements  Intervals                                Axis  Rate:       66                           P:          33  AK:         153                          QRS:        9  QRSD:       96                           T:          29  QT:         400  QTc:        420    Interpretive Statements  Sinus rhythm  Low voltage, extremity leads  Compared to ECG 2018 15:40:44  Low QRS voltage now present  Ventricular premature complex(es) no longer present           LABS  Labs Reviewed   CBC WITH DIFFERENTIAL - Abnormal; Notable for the following components:       Result Value    RDW 53.5 (*)     Platelet Count 161 (*)     MPV 8.9 (*)     Monos (Absolute) 0.98 (*)     All other components within normal limits    Narrative:     Biotin intake of greater than 5 mg per day may interfere with                  troponin levels,  causing false low values.   COMP METABOLIC PANEL - Abnormal; Notable for the following components:    Glucose 116 (*)     All other components within normal limits    Narrative:     Biotin intake of greater than 5 mg per day may interfere with                  troponin levels, causing false low values.   PROBRAIN NATRIURETIC PEPTIDE, NT - Abnormal; Notable for the following components:    NT-proBNP 159 (*)     All other components within normal limits    Narrative:     Biotin intake of greater than 5 mg per day may interfere with                  troponin levels, causing false low values.   TROPONIN - Abnormal; Notable for the following components:    Troponin T 27 (*)     All other components within normal limits    Narrative:     Biotin intake of greater than 5 mg per day may interfere with                  troponin levels, causing false low values.   ESTIMATED GFR - Abnormal; Notable for the following components:    GFR (CKD-EPI) 58 (*)     All other components within normal limits    Narrative:     Biotin intake of greater than 5 mg per day may interfere with                  troponin levels, causing false low values.   DIAGNOSTIC ALCOHOL    Narrative:     Biotin intake of greater than 5 mg per day may interfere with                  troponin levels, causing false low values.   COV-2, FLU A/B, AND RSV BY PCR (ViewReple)    Narrative:     Release to patient->Immediate   URINALYSIS,CULTURE IF INDICATED         RADIOLOGY  I independently reviewed and interpreted the images obtained today in the ER.    Radiologist interpretation:   DX-CHEST-LIMITED (1 VIEW)   Final Result         1.  Hazy pulmonary opacity suggesting subtle infiltrate   2.  Small left pleural effusion   3.  Cardiomegaly   4.  Atherosclerosis      CT-CSPINE WITHOUT PLUS RECONS   Final Result         1.  Multilevel degenerative changes of the cervical spine limit diagnostic sensitivity of this examination, otherwise no acute traumatic bony injury of the  cervical spine is apparent.   2.  Layering left pleural effusion      CT-HEAD W/O   Final Result         1.  No acute intracranial abnormality is identified, there are nonspecific white matter changes, commonly associated with small vessel ischemic disease.  Associated mild cerebral atrophy is noted.   2.  Left maxillary sinusitis changes   3.  Atherosclerosis.              COURSE & MEDICAL DECISION MAKING    ED COURSE:      INTERVENTIONS BY ME:  Medications   Bolus of LR (0 mL Intravenous Stopped 10/23/23 0400)   lidocaine (Xylocaine) 1 % injection 10 mL (10 mL Other Given 10/23/23 0425)   amoxicillin-clavulanate (Augmentin) 875-125 MG per tablet 1 Tablet (1 Tablet Oral Given 10/23/23 0632)   doxycycline monohydrate (Adoxa) tablet 100 mg (100 mg Oral Given 10/23/23 0632)       INITIAL ASSESSMENT, COURSE AND PLAN  Care Narrative:       Patient presenting with what he described as mechanical ground-level fall causing trauma to his head, given his heavy age work-up was performed to ensure there is not a medical etiology to his falls such as a pneumonia sepsis electrolyte disturbance or arrhythmia, work-up is reassuring, chest radiograph did show a possible mild infiltrate he is not tachypneic no signs of sepsis suitable for discharge home on oral antibiotics.  His laceration was repaired with staples without complication     ADDITIONAL PROBLEM LIST    DISPOSITION AND DISCUSSIONS      Escalation of care considered, and ultimately not performed:acute inpatient care management, however at this time, the patient is most appropriate for outpatient management        FINAL DIAGNOSIS  1. Laceration of scalp, initial encounter    2. Ground-level fall    3. Pneumonia of left lung due to infectious organism, unspecified part of lung             Electronically signed by: Kirill Limon DO ,8:00 AM 10/24/23
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
## 2023-11-03 PROBLEM — S01.81XD FOREHEAD LACERATION, SUBSEQUENT ENCOUNTER: Status: ACTIVE | Noted: 2023-11-03

## 2023-11-03 PROBLEM — Z48.02 ENCOUNTER FOR STAPLE REMOVAL: Status: ACTIVE | Noted: 2023-11-03

## 2023-12-26 ENCOUNTER — HOSPITAL ENCOUNTER (EMERGENCY)
Facility: MEDICAL CENTER | Age: 73
End: 2023-12-26
Attending: EMERGENCY MEDICINE
Payer: MEDICARE

## 2023-12-26 ENCOUNTER — APPOINTMENT (OUTPATIENT)
Dept: RADIOLOGY | Facility: MEDICAL CENTER | Age: 73
End: 2023-12-26
Attending: EMERGENCY MEDICINE
Payer: MEDICARE

## 2023-12-26 VITALS
RESPIRATION RATE: 20 BRPM | HEIGHT: 69 IN | TEMPERATURE: 97.8 F | OXYGEN SATURATION: 93 % | DIASTOLIC BLOOD PRESSURE: 73 MMHG | WEIGHT: 210 LBS | BODY MASS INDEX: 31.1 KG/M2 | HEART RATE: 72 BPM | SYSTOLIC BLOOD PRESSURE: 156 MMHG

## 2023-12-26 DIAGNOSIS — J18.9 PNEUMONIA OF LEFT LOWER LOBE DUE TO INFECTIOUS ORGANISM: ICD-10-CM

## 2023-12-26 DIAGNOSIS — U07.1 COVID-19: ICD-10-CM

## 2023-12-26 LAB
ALBUMIN SERPL BCP-MCNC: 4.1 G/DL (ref 3.2–4.9)
ALBUMIN/GLOB SERPL: 1.3 G/DL
ALP SERPL-CCNC: 89 U/L (ref 30–99)
ALT SERPL-CCNC: 20 U/L (ref 2–50)
ANION GAP SERPL CALC-SCNC: 12 MMOL/L (ref 7–16)
AST SERPL-CCNC: 29 U/L (ref 12–45)
BASOPHILS # BLD AUTO: 0.3 % (ref 0–1.8)
BASOPHILS # BLD: 0.03 K/UL (ref 0–0.12)
BILIRUB SERPL-MCNC: 0.5 MG/DL (ref 0.1–1.5)
BUN SERPL-MCNC: 21 MG/DL (ref 8–22)
CALCIUM ALBUM COR SERPL-MCNC: 8.7 MG/DL (ref 8.5–10.5)
CALCIUM SERPL-MCNC: 8.8 MG/DL (ref 8.5–10.5)
CHLORIDE SERPL-SCNC: 108 MMOL/L (ref 96–112)
CO2 SERPL-SCNC: 24 MMOL/L (ref 20–33)
CREAT SERPL-MCNC: 1.21 MG/DL (ref 0.5–1.4)
EOSINOPHIL # BLD AUTO: 0.28 K/UL (ref 0–0.51)
EOSINOPHIL NFR BLD: 3.2 % (ref 0–6.9)
ERYTHROCYTE [DISTWIDTH] IN BLOOD BY AUTOMATED COUNT: 53.1 FL (ref 35.9–50)
GFR SERPLBLD CREATININE-BSD FMLA CKD-EPI: 64 ML/MIN/1.73 M 2
GLOBULIN SER CALC-MCNC: 3.2 G/DL (ref 1.9–3.5)
GLUCOSE SERPL-MCNC: 90 MG/DL (ref 65–99)
HCT VFR BLD AUTO: 42.4 % (ref 42–52)
HGB BLD-MCNC: 13.8 G/DL (ref 14–18)
IMM GRANULOCYTES # BLD AUTO: 0.03 K/UL (ref 0–0.11)
IMM GRANULOCYTES NFR BLD AUTO: 0.3 % (ref 0–0.9)
LYMPHOCYTES # BLD AUTO: 4.7 K/UL (ref 1–4.8)
LYMPHOCYTES NFR BLD: 53.7 % (ref 22–41)
MCH RBC QN AUTO: 30.1 PG (ref 27–33)
MCHC RBC AUTO-ENTMCNC: 32.5 G/DL (ref 32.3–36.5)
MCV RBC AUTO: 92.6 FL (ref 81.4–97.8)
MONOCYTES # BLD AUTO: 0.72 K/UL (ref 0–0.85)
MONOCYTES NFR BLD AUTO: 8.2 % (ref 0–13.4)
NEUTROPHILS # BLD AUTO: 2.99 K/UL (ref 1.82–7.42)
NEUTROPHILS NFR BLD: 34.3 % (ref 44–72)
NRBC # BLD AUTO: 0 K/UL
NRBC BLD-RTO: 0 /100 WBC (ref 0–0.2)
PLATELET # BLD AUTO: 150 K/UL (ref 164–446)
PMV BLD AUTO: 9 FL (ref 9–12.9)
POTASSIUM SERPL-SCNC: 3.9 MMOL/L (ref 3.6–5.5)
PROT SERPL-MCNC: 7.3 G/DL (ref 6–8.2)
RBC # BLD AUTO: 4.58 M/UL (ref 4.7–6.1)
SODIUM SERPL-SCNC: 144 MMOL/L (ref 135–145)
WBC # BLD AUTO: 8.8 K/UL (ref 4.8–10.8)

## 2023-12-26 PROCEDURE — 99284 EMERGENCY DEPT VISIT MOD MDM: CPT

## 2023-12-26 PROCEDURE — 85025 COMPLETE CBC W/AUTO DIFF WBC: CPT

## 2023-12-26 PROCEDURE — 36415 COLL VENOUS BLD VENIPUNCTURE: CPT

## 2023-12-26 PROCEDURE — 71045 X-RAY EXAM CHEST 1 VIEW: CPT

## 2023-12-26 PROCEDURE — 80053 COMPREHEN METABOLIC PANEL: CPT

## 2023-12-26 RX ORDER — AZITHROMYCIN 250 MG/1
TABLET, FILM COATED ORAL
Qty: 6 TABLET | Refills: 0 | Status: ACTIVE | OUTPATIENT
Start: 2023-12-26 | End: 2023-12-31

## 2023-12-26 ASSESSMENT — FIBROSIS 4 INDEX: FIB4 SCORE: 2.44

## 2023-12-26 NOTE — ED TRIAGE NOTES
.  Chief Complaint   Patient presents with    Flu Like Symptoms     Home COVID test + today      Pt BIB EMS from Group Home. Per report Pt has cough that began last night, positive COVID test today. Pt has Hx of lung cancer and is under current treatment. Family member asked for Pt to be brought in to ED.

## 2023-12-26 NOTE — ED NOTES
Discharge instructions given to Steve, prescriptions provided, a verbal understanding of all instructions was stated. IV removed, cathlon intact, site without s/s of infection. Pt preferred to be taken by wheel chair out accompanied by Steve VSS,  all belongings accounted for. Steve is transportation back to group home.

## 2023-12-26 NOTE — ED PROVIDER NOTES
ED Provider Note    CHIEF COMPLAINT  Chief Complaint   Patient presents with    Flu Like Symptoms     Home COVID test + today       EXTERNAL RECORDS REVIEWED  Other needed records from the patient's group home where he tested positive for COVID yesterday    HPI/ROS    Austin Woodard is a 73 y.o. male who presents with a cough and difficulty with breathing.  The patient states has been ongoing over the last 48 hours.  He does have a history of lung cancer and is currently under treatment.  He is currently at a group home and tested positive for COVID yesterday.  He was sent into the emergency department for evaluation.  He states he does have generalized weakness but no focal weakness.  He has not had any vomiting or diarrhea.    PAST MEDICAL HISTORY   has a past medical history of Bowel habit changes, Glaucoma, Hypertension, Psychiatric problem, Renal disorder, Shortness of breath, Unspecified cataract, Urinary bladder disorder, and Urinary incontinence (01/02/2018).    SURGICAL HISTORY   has a past surgical history that includes cataract phaco with iol; hchg implant glaucoma baerveldt; rotator cuff repair; other orthopedic surgery; other; baerveldt implant (Right, 9/4/2015); and penetrating keratoplasty (Right, 1/30/2018).    FAMILY HISTORY  History reviewed. No pertinent family history.    SOCIAL HISTORY  Social History     Tobacco Use    Smoking status: Never    Smokeless tobacco: Never   Vaping Use    Vaping Use: Never used   Substance and Sexual Activity    Alcohol use: No    Drug use: No    Sexual activity: Not on file       CURRENT MEDICATIONS  Home Medications       Reviewed by Mari Espinosa R.N. (Registered Nurse) on 12/26/23 at 1326  Med List Status: Partial     Medication Last Dose Status   allopurinol (ZYLOPRIM) 300 MG Tab  Active   ARIPiprazole (ABILIFY) 10 MG Tab  Active   artificial tears 1.4 % Solution  Active   ASPIRIN LOW DOSE 81 MG Chew Tab chewable tablet  Active   benzonatate (TESSALON)  "100 MG Cap  Active   chlorhexidine (PERIDEX) 0.12 % Solution  Active   Cholecalciferol (VITAMIN D) 2000 UNIT Tab  Active   dasatinib (SPRYCEL) 50 MG tablet  Active   docusate sodium (COLACE) 100 MG Cap  Active   dorzolamide-timolol (COSOPT) 22.3-6.8 MG/ML Solution  Active   guaifenesin dextromethorphan sugar free (ROBAFEN DM COUGH)  MG/5ML Liquid soln  Active   latanoprost (XALATAN) 0.005 % Solution  Active   Lidocaine-Glycerin (PREPARATION H EX)  Active   loperamide (IMODIUM) 2 MG Cap  Active   Mirabegron ER (MYRBETRIQ) 50 MG TABLET SR 24 HR  Active   niacin 500 MG Tab  Active   PHOSPHA 250 NEUTRAL 155-852-130 MG tablet  Active   polyethylene glycol/lytes (MIRALAX) 17 g Pack  Active   tamsulosin (FLOMAX) 0.4 MG capsule  Active   triamcinolone acetonide (KENALOG) 0.1 % Cream  Active                    ALLERGIES  Allergies   Allergen Reactions    Celexa Hives     And paranoia     Zoloft Hives     paranoia       PHYSICAL EXAM  VITAL SIGNS: BP (!) 154/74   Pulse 80   Temp 36.6 °C (97.9 °F) (Temporal)   Resp 18   Ht 1.753 m (5' 9\")   Wt 95.3 kg (210 lb)   SpO2 94%   BMI 31.01 kg/m²    General the patient appears chronically ill    HEENT unremarkable    Pulmonary the patient's lungs are slightly diminished throughout    Cardiovascular S1-S2 with a regular rate and rhythm    GI abdomen soft    Skin no rashes, pallor, no jaundice    Extremities no distal edema    Neurologic examination is grossly intact    DIAGNOSTIC STUDIES   Results for orders placed or performed during the hospital encounter of 12/26/23   CBC WITH DIFFERENTIAL   Result Value Ref Range    WBC 8.8 4.8 - 10.8 K/uL    RBC 4.58 (L) 4.70 - 6.10 M/uL    Hemoglobin 13.8 (L) 14.0 - 18.0 g/dL    Hematocrit 42.4 42.0 - 52.0 %    MCV 92.6 81.4 - 97.8 fL    MCH 30.1 27.0 - 33.0 pg    MCHC 32.5 32.3 - 36.5 g/dL    RDW 53.1 (H) 35.9 - 50.0 fL    Platelet Count 150 (L) 164 - 446 K/uL    MPV 9.0 9.0 - 12.9 fL    Neutrophils-Polys 34.30 (L) 44.00 - 72.00 % "    Lymphocytes 53.70 (H) 22.00 - 41.00 %    Monocytes 8.20 0.00 - 13.40 %    Eosinophils 3.20 0.00 - 6.90 %    Basophils 0.30 0.00 - 1.80 %    Immature Granulocytes 0.30 0.00 - 0.90 %    Nucleated RBC 0.00 0.00 - 0.20 /100 WBC    Neutrophils (Absolute) 2.99 1.82 - 7.42 K/uL    Lymphs (Absolute) 4.70 1.00 - 4.80 K/uL    Monos (Absolute) 0.72 0.00 - 0.85 K/uL    Eos (Absolute) 0.28 0.00 - 0.51 K/uL    Baso (Absolute) 0.03 0.00 - 0.12 K/uL    Immature Granulocytes (abs) 0.03 0.00 - 0.11 K/uL    NRBC (Absolute) 0.00 K/uL   COMP METABOLIC PANEL   Result Value Ref Range    Sodium 144 135 - 145 mmol/L    Potassium 3.9 3.6 - 5.5 mmol/L    Chloride 108 96 - 112 mmol/L    Co2 24 20 - 33 mmol/L    Anion Gap 12.0 7.0 - 16.0    Glucose 90 65 - 99 mg/dL    Bun 21 8 - 22 mg/dL    Creatinine 1.21 0.50 - 1.40 mg/dL    Calcium 8.8 8.5 - 10.5 mg/dL    Correct Calcium 8.7 8.5 - 10.5 mg/dL    AST(SGOT) 29 12 - 45 U/L    ALT(SGPT) 20 2 - 50 U/L    Alkaline Phosphatase 89 30 - 99 U/L    Total Bilirubin 0.5 0.1 - 1.5 mg/dL    Albumin 4.1 3.2 - 4.9 g/dL    Total Protein 7.3 6.0 - 8.2 g/dL    Globulin 3.2 1.9 - 3.5 g/dL    A-G Ratio 1.3 g/dL   ESTIMATED GFR   Result Value Ref Range    GFR (CKD-EPI) 64 >60 mL/min/1.73 m 2       RADIOLOGY  I have independently interpreted the diagnostic imaging associated with this visit and am waiting the final reading from the radiologist.   My preliminary interpretation is as follows: Chest x-ray reviewed and the patient does have an opacity in the left hemithorax however this does not appear to be acute  Radiologist interpretation:   DX-CHEST-PORTABLE (1 VIEW)   Final Result      1.  Prominent opacity in the left lower hemithorax obscuring the left heart border and hemidiaphragm consistent with left lower lobe and lingular atelectasis or consolidation.   2.  Possible underlying left pleural effusion as well.   3.  Similar appearance to prior exam from 10/23/2023. One could not exclude chronic  postobstructive pneumonia due to central/endobronchial mass lesion.            COURSE & MEDICAL DECISION MAKING    This is 73-year-old male who presents the emergency department with signs and symptoms consistent with a viral pneumonitis.  He did test positive for COVID yesterday and I suspect this is the source.  Chest x-ray does show left lower lobe infiltrate versus mass but appears to be chronic.  I cannot fully exclude a developing pneumonia and therefore we will treat the patient with azithromycin for possible bacterial pneumonia.  My suspicion is this is viral.  Otherwise laboratory analysis is reassuring.  The patient does not have any renal insufficiency and he is not leukopenic.  The patient is not oxygen dependent.  The patient be transferred back to his group home with instructions for supportive treatment.  I did discuss Paxlovid treatment with pharmacy and secondary to his chemotherapy drug he is not a candidate.  The patient will return for increased nests or increased work of breathing.    The patient's blood pressure is noted to be elevated and I would like to have him have his blood pressure rechecked in 7 to 10 days.    We consider escalation for admission but there is no indication for admission at this time as the patient is not hypoxic nor toxic.    FINAL DIAGNOSIS  1.  COVID-19  2.  Questionable left lower lobe pneumonia  3.  Hypertension    Disposition  The patient will be discharged in stable condition       Electronically signed by: Ismael León M.D., 12/26/2023 1:54 PM

## 2024-01-31 PROBLEM — H61.21 EXCESSIVE CERUMEN IN RIGHT EAR CANAL: Status: ACTIVE | Noted: 2024-01-31

## 2024-03-13 PROBLEM — G47.33 OSA (OBSTRUCTIVE SLEEP APNEA): Chronic | Status: ACTIVE | Noted: 2024-03-13

## 2024-03-13 PROBLEM — G47.33 OSA (OBSTRUCTIVE SLEEP APNEA): Status: ACTIVE | Noted: 2024-03-13

## 2024-03-13 PROBLEM — J90 PLEURAL EFFUSION: Chronic | Status: ACTIVE | Noted: 2022-06-22

## 2024-03-13 PROBLEM — R03.0 ELEVATED BLOOD PRESSURE READING: Status: ACTIVE | Noted: 2024-03-13

## 2024-03-13 PROBLEM — N18.9 CKD (CHRONIC KIDNEY DISEASE): Status: ACTIVE | Noted: 2021-06-20

## 2024-03-13 PROBLEM — M1A.9XX0 CHRONIC GOUT: Chronic | Status: ACTIVE | Noted: 2021-06-20

## 2024-03-13 PROBLEM — F31.9 BIPOLAR 1 DISORDER (HCC): Chronic | Status: ACTIVE | Noted: 2021-06-20

## 2024-03-13 PROBLEM — R26.81 GAIT INSTABILITY: Chronic | Status: ACTIVE | Noted: 2021-06-20

## 2024-04-25 PROBLEM — E83.39 HYPOPHOSPHATEMIA: Status: ACTIVE | Noted: 2024-04-25

## 2024-04-25 PROBLEM — G47.34 NOCTURNAL HYPOXIA: Status: ACTIVE | Noted: 2024-04-25

## 2024-04-25 PROBLEM — M54.9 BACK PAIN: Status: ACTIVE | Noted: 2024-04-25

## 2024-10-29 ENCOUNTER — HOSPITAL ENCOUNTER (INPATIENT)
Facility: MEDICAL CENTER | Age: 74
End: 2024-10-29
Attending: EMERGENCY MEDICINE | Admitting: HOSPITALIST
Payer: COMMERCIAL

## 2024-10-29 ENCOUNTER — APPOINTMENT (OUTPATIENT)
Dept: RADIOLOGY | Facility: MEDICAL CENTER | Age: 74
DRG: 193 | End: 2024-10-29
Attending: EMERGENCY MEDICINE
Payer: COMMERCIAL

## 2024-10-29 DIAGNOSIS — F31.9 BIPOLAR 1 DISORDER (HCC): Chronic | ICD-10-CM

## 2024-10-29 DIAGNOSIS — J18.9 PNEUMONIA OF BOTH LUNGS DUE TO INFECTIOUS ORGANISM, UNSPECIFIED PART OF LUNG: ICD-10-CM

## 2024-10-29 DIAGNOSIS — J90 PLEURAL EFFUSION: ICD-10-CM

## 2024-10-29 DIAGNOSIS — Z78.9 IMPAIRED DECISION MAKING: Chronic | ICD-10-CM

## 2024-10-29 DIAGNOSIS — T50.2X5A DIURETIC-INDUCED HYPOKALEMIA: ICD-10-CM

## 2024-10-29 DIAGNOSIS — R93.89 ABNORMAL CT OF THE CHEST: ICD-10-CM

## 2024-10-29 DIAGNOSIS — R65.20 SEPSIS WITH ACUTE HYPOXIC RESPIRATORY FAILURE WITHOUT SEPTIC SHOCK, DUE TO UNSPECIFIED ORGANISM (HCC): ICD-10-CM

## 2024-10-29 DIAGNOSIS — J96.21 ACUTE ON CHRONIC RESPIRATORY FAILURE WITH HYPOXIA (HCC): ICD-10-CM

## 2024-10-29 DIAGNOSIS — G47.33 OSA (OBSTRUCTIVE SLEEP APNEA): Chronic | ICD-10-CM

## 2024-10-29 DIAGNOSIS — J96.01 SEPSIS WITH ACUTE HYPOXIC RESPIRATORY FAILURE WITHOUT SEPTIC SHOCK, DUE TO UNSPECIFIED ORGANISM (HCC): ICD-10-CM

## 2024-10-29 DIAGNOSIS — G47.39 OTHER SLEEP APNEA: ICD-10-CM

## 2024-10-29 DIAGNOSIS — A41.9 SEPSIS WITH ACUTE HYPOXIC RESPIRATORY FAILURE WITHOUT SEPTIC SHOCK, DUE TO UNSPECIFIED ORGANISM (HCC): ICD-10-CM

## 2024-10-29 DIAGNOSIS — N40.1 BENIGN PROSTATIC HYPERPLASIA WITH LOWER URINARY TRACT SYMPTOMS, SYMPTOM DETAILS UNSPECIFIED: ICD-10-CM

## 2024-10-29 DIAGNOSIS — Z99.89 CPAP (CONTINUOUS POSITIVE AIRWAY PRESSURE) DEPENDENCE: ICD-10-CM

## 2024-10-29 DIAGNOSIS — H40.1134 PRIMARY OPEN ANGLE GLAUCOMA (POAG) OF BOTH EYES, INDETERMINATE STAGE: Chronic | ICD-10-CM

## 2024-10-29 DIAGNOSIS — J96.11 CHRONIC HYPOXEMIC RESPIRATORY FAILURE (HCC): ICD-10-CM

## 2024-10-29 DIAGNOSIS — N18.31 STAGE 3A CHRONIC KIDNEY DISEASE: ICD-10-CM

## 2024-10-29 DIAGNOSIS — J96.01 ACUTE HYPOXEMIC RESPIRATORY FAILURE (HCC): ICD-10-CM

## 2024-10-29 DIAGNOSIS — E87.6 DIURETIC-INDUCED HYPOKALEMIA: ICD-10-CM

## 2024-10-29 DIAGNOSIS — E66.811 CLASS 1 OBESITY: ICD-10-CM

## 2024-10-29 DIAGNOSIS — C92.10 CHRONIC MYELOCYTIC LEUKEMIA (HCC): ICD-10-CM

## 2024-10-29 DIAGNOSIS — F32.1 MAJOR DEPRESSIVE DISORDER, SINGLE EPISODE, MODERATE (HCC): ICD-10-CM

## 2024-10-29 DIAGNOSIS — I10 ESSENTIAL HYPERTENSION: ICD-10-CM

## 2024-10-29 LAB
ALBUMIN SERPL BCP-MCNC: 3.5 G/DL (ref 3.2–4.9)
ALBUMIN/GLOB SERPL: 1 G/DL
ALP SERPL-CCNC: 119 U/L (ref 30–99)
ALT SERPL-CCNC: 15 U/L (ref 2–50)
AMORPH CRY #/AREA URNS HPF: PRESENT /HPF
ANION GAP SERPL CALC-SCNC: 10 MMOL/L (ref 7–16)
APPEARANCE UR: CLEAR
AST SERPL-CCNC: 26 U/L (ref 12–45)
BACTERIA #/AREA URNS HPF: ABNORMAL /HPF
BACTERIA BLD CULT: NORMAL
BACTERIA BLD CULT: NORMAL
BASOPHILS # BLD AUTO: 0.2 % (ref 0–1.8)
BASOPHILS # BLD: 0.02 K/UL (ref 0–0.12)
BILIRUB SERPL-MCNC: 0.6 MG/DL (ref 0.1–1.5)
BILIRUB UR QL STRIP.AUTO: NEGATIVE
BUN SERPL-MCNC: 13 MG/DL (ref 8–22)
CALCIUM ALBUM COR SERPL-MCNC: 9 MG/DL (ref 8.5–10.5)
CALCIUM SERPL-MCNC: 8.6 MG/DL (ref 8.4–10.2)
CHLORIDE SERPL-SCNC: 103 MMOL/L (ref 96–112)
CO2 SERPL-SCNC: 24 MMOL/L (ref 20–33)
COLOR UR: YELLOW
CREAT SERPL-MCNC: 1.22 MG/DL (ref 0.5–1.4)
EOSINOPHIL # BLD AUTO: 0.02 K/UL (ref 0–0.51)
EOSINOPHIL NFR BLD: 0.2 % (ref 0–6.9)
EPI CELLS #/AREA URNS HPF: ABNORMAL /HPF
ERYTHROCYTE [DISTWIDTH] IN BLOOD BY AUTOMATED COUNT: 54.4 FL (ref 35.9–50)
FLUAV RNA SPEC QL NAA+PROBE: NEGATIVE
FLUBV RNA SPEC QL NAA+PROBE: NEGATIVE
GFR SERPLBLD CREATININE-BSD FMLA CKD-EPI: 63 ML/MIN/1.73 M 2
GLOBULIN SER CALC-MCNC: 3.4 G/DL (ref 1.9–3.5)
GLUCOSE SERPL-MCNC: 139 MG/DL (ref 65–99)
GLUCOSE UR STRIP.AUTO-MCNC: NEGATIVE MG/DL
HCT VFR BLD AUTO: 37.6 % (ref 42–52)
HGB BLD-MCNC: 12.5 G/DL (ref 14–18)
IMM GRANULOCYTES # BLD AUTO: 0.03 K/UL (ref 0–0.11)
IMM GRANULOCYTES NFR BLD AUTO: 0.3 % (ref 0–0.9)
INR PPP: 1.25 (ref 0.87–1.13)
KETONES UR STRIP.AUTO-MCNC: NEGATIVE MG/DL
LACTATE SERPL-SCNC: 1.6 MMOL/L (ref 0.5–2)
LEUKOCYTE ESTERASE UR QL STRIP.AUTO: NEGATIVE
LYMPHOCYTES # BLD AUTO: 2.23 K/UL (ref 1–4.8)
LYMPHOCYTES NFR BLD: 18.7 % (ref 22–41)
MCH RBC QN AUTO: 29.3 PG (ref 27–33)
MCHC RBC AUTO-ENTMCNC: 33.2 G/DL (ref 32.3–36.5)
MCV RBC AUTO: 88.3 FL (ref 81.4–97.8)
MICRO URNS: ABNORMAL
MONOCYTES # BLD AUTO: 1.15 K/UL (ref 0–0.85)
MONOCYTES NFR BLD AUTO: 9.7 % (ref 0–13.4)
MUCOUS THREADS #/AREA URNS HPF: ABNORMAL /HPF
NEUTROPHILS # BLD AUTO: 8.45 K/UL (ref 1.82–7.42)
NEUTROPHILS NFR BLD: 70.9 % (ref 44–72)
NITRITE UR QL STRIP.AUTO: NEGATIVE
NRBC # BLD AUTO: 0 K/UL
NRBC BLD-RTO: 0 /100 WBC (ref 0–0.2)
NT-PROBNP SERPL IA-MCNC: 625 PG/ML (ref 0–125)
PH UR STRIP.AUTO: 6 [PH] (ref 5–8)
PLATELET # BLD AUTO: 130 K/UL (ref 164–446)
PMV BLD AUTO: 8.9 FL (ref 9–12.9)
POTASSIUM SERPL-SCNC: 4.1 MMOL/L (ref 3.6–5.5)
PROT SERPL-MCNC: 6.9 G/DL (ref 6–8.2)
PROT UR QL STRIP: 30 MG/DL
PROTHROMBIN TIME: 16.1 SEC (ref 12–14.6)
RBC # BLD AUTO: 4.26 M/UL (ref 4.7–6.1)
RBC UR QL AUTO: ABNORMAL
RSV RNA SPEC QL NAA+PROBE: NEGATIVE
SARS-COV-2 RNA RESP QL NAA+PROBE: NOTDETECTED
SIGNIFICANT IND 70042: NORMAL
SIGNIFICANT IND 70042: NORMAL
SITE SITE: NORMAL
SITE SITE: NORMAL
SODIUM SERPL-SCNC: 137 MMOL/L (ref 135–145)
SOURCE SOURCE: NORMAL
SOURCE SOURCE: NORMAL
SP GR UR STRIP.AUTO: 1.02
SPECIMEN SOURCE: NORMAL
TROPONIN T SERPL-MCNC: 28 NG/L (ref 6–19)
WBC # BLD AUTO: 11.9 K/UL (ref 4.8–10.8)
WBC #/AREA URNS HPF: ABNORMAL /HPF

## 2024-10-29 PROCEDURE — 80053 COMPREHEN METABOLIC PANEL: CPT

## 2024-10-29 PROCEDURE — 71045 X-RAY EXAM CHEST 1 VIEW: CPT

## 2024-10-29 PROCEDURE — 96365 THER/PROPH/DIAG IV INF INIT: CPT

## 2024-10-29 PROCEDURE — 87086 URINE CULTURE/COLONY COUNT: CPT

## 2024-10-29 PROCEDURE — 84484 ASSAY OF TROPONIN QUANT: CPT

## 2024-10-29 PROCEDURE — 99285 EMERGENCY DEPT VISIT HI MDM: CPT

## 2024-10-29 PROCEDURE — 87040 BLOOD CULTURE FOR BACTERIA: CPT

## 2024-10-29 PROCEDURE — 99223 1ST HOSP IP/OBS HIGH 75: CPT | Performed by: HOSPITALIST

## 2024-10-29 PROCEDURE — 700111 HCHG RX REV CODE 636 W/ 250 OVERRIDE (IP): Mod: JZ

## 2024-10-29 PROCEDURE — 85610 PROTHROMBIN TIME: CPT

## 2024-10-29 PROCEDURE — 770020 HCHG ROOM/CARE - TELE (206)

## 2024-10-29 PROCEDURE — 700111 HCHG RX REV CODE 636 W/ 250 OVERRIDE (IP): Performed by: EMERGENCY MEDICINE

## 2024-10-29 PROCEDURE — 0241U HCHG SARS-COV-2 COVID-19 NFCT DS RESP RNA 4 TRGT MIC: CPT

## 2024-10-29 PROCEDURE — 36415 COLL VENOUS BLD VENIPUNCTURE: CPT

## 2024-10-29 PROCEDURE — 96368 THER/DIAG CONCURRENT INF: CPT

## 2024-10-29 PROCEDURE — 700111 HCHG RX REV CODE 636 W/ 250 OVERRIDE (IP): Mod: JZ | Performed by: HOSPITALIST

## 2024-10-29 PROCEDURE — 81001 URINALYSIS AUTO W/SCOPE: CPT

## 2024-10-29 PROCEDURE — 83605 ASSAY OF LACTIC ACID: CPT

## 2024-10-29 PROCEDURE — 93005 ELECTROCARDIOGRAM TRACING: CPT | Performed by: EMERGENCY MEDICINE

## 2024-10-29 PROCEDURE — 85025 COMPLETE CBC W/AUTO DIFF WBC: CPT

## 2024-10-29 PROCEDURE — 83880 ASSAY OF NATRIURETIC PEPTIDE: CPT

## 2024-10-29 PROCEDURE — 700105 HCHG RX REV CODE 258: Performed by: HOSPITALIST

## 2024-10-29 PROCEDURE — 700105 HCHG RX REV CODE 258: Performed by: EMERGENCY MEDICINE

## 2024-10-29 PROCEDURE — 96366 THER/PROPH/DIAG IV INF ADDON: CPT

## 2024-10-29 RX ORDER — AZITHROMYCIN 250 MG/1
500 TABLET, FILM COATED ORAL EVERY EVENING
Status: COMPLETED | OUTPATIENT
Start: 2024-10-30 | End: 2024-10-31

## 2024-10-29 RX ORDER — AZITHROMYCIN MONOHYDRATE 500 MG/5ML
INJECTION, POWDER, LYOPHILIZED, FOR SOLUTION INTRAVENOUS
Status: COMPLETED
Start: 2024-10-29 | End: 2024-10-29

## 2024-10-29 RX ORDER — AMOXICILLIN 250 MG
2 CAPSULE ORAL 2 TIMES DAILY
Status: DISCONTINUED | OUTPATIENT
Start: 2024-10-29 | End: 2024-11-06 | Stop reason: HOSPADM

## 2024-10-29 RX ORDER — ONDANSETRON 4 MG/1
4 TABLET, ORALLY DISINTEGRATING ORAL EVERY 4 HOURS PRN
Status: DISCONTINUED | OUTPATIENT
Start: 2024-10-29 | End: 2024-11-06 | Stop reason: HOSPADM

## 2024-10-29 RX ORDER — NIACIN 500 MG
500 TABLET ORAL EVERY EVENING
Status: DISCONTINUED | OUTPATIENT
Start: 2024-10-30 | End: 2024-11-06 | Stop reason: HOSPADM

## 2024-10-29 RX ORDER — BENZONATATE 100 MG/1
100 CAPSULE ORAL 3 TIMES DAILY PRN
Status: DISCONTINUED | OUTPATIENT
Start: 2024-10-29 | End: 2024-11-06 | Stop reason: HOSPADM

## 2024-10-29 RX ORDER — LATANOPROST 50 UG/ML
1 SOLUTION/ DROPS OPHTHALMIC NIGHTLY
Status: DISCONTINUED | OUTPATIENT
Start: 2024-10-30 | End: 2024-11-06 | Stop reason: HOSPADM

## 2024-10-29 RX ORDER — ACETAMINOPHEN 325 MG/1
650 TABLET ORAL EVERY 6 HOURS PRN
Status: DISCONTINUED | OUTPATIENT
Start: 2024-10-29 | End: 2024-11-06 | Stop reason: HOSPADM

## 2024-10-29 RX ORDER — DORZOLAMIDE HYDROCHLORIDE AND TIMOLOL MALEATE 20; 5 MG/ML; MG/ML
1 SOLUTION/ DROPS OPHTHALMIC 2 TIMES DAILY
Status: DISCONTINUED | OUTPATIENT
Start: 2024-10-30 | End: 2024-11-06 | Stop reason: HOSPADM

## 2024-10-29 RX ORDER — TAMSULOSIN HYDROCHLORIDE 0.4 MG/1
0.4 CAPSULE ORAL
Status: DISCONTINUED | OUTPATIENT
Start: 2024-10-30 | End: 2024-11-06 | Stop reason: HOSPADM

## 2024-10-29 RX ORDER — ARIPIPRAZOLE 10 MG/1
10 TABLET ORAL DAILY
Status: DISCONTINUED | OUTPATIENT
Start: 2024-10-30 | End: 2024-11-06 | Stop reason: HOSPADM

## 2024-10-29 RX ORDER — DEXTROMETHORPHAN HBR. AND GUAIFENESIN 10; 100 MG/5ML; MG/5ML
10 SOLUTION ORAL EVERY 6 HOURS PRN
Status: DISCONTINUED | OUTPATIENT
Start: 2024-10-29 | End: 2024-10-29

## 2024-10-29 RX ORDER — ONDANSETRON 2 MG/ML
4 INJECTION INTRAMUSCULAR; INTRAVENOUS EVERY 4 HOURS PRN
Status: DISCONTINUED | OUTPATIENT
Start: 2024-10-29 | End: 2024-11-06 | Stop reason: HOSPADM

## 2024-10-29 RX ORDER — ACETAMINOPHEN 10 MG/ML
1000 INJECTION, SOLUTION INTRAVENOUS ONCE
Status: COMPLETED | OUTPATIENT
Start: 2024-10-29 | End: 2024-10-29

## 2024-10-29 RX ORDER — ACETAMINOPHEN 325 MG/1
975 TABLET ORAL ONCE
Status: DISCONTINUED | OUTPATIENT
Start: 2024-10-29 | End: 2024-10-29

## 2024-10-29 RX ORDER — ASPIRIN 81 MG/1
81 TABLET, CHEWABLE ORAL DAILY
Status: DISCONTINUED | OUTPATIENT
Start: 2024-10-30 | End: 2024-11-06 | Stop reason: HOSPADM

## 2024-10-29 RX ORDER — AZITHROMYCIN 250 MG/1
500 TABLET, FILM COATED ORAL ONCE
Status: DISCONTINUED | OUTPATIENT
Start: 2024-10-29 | End: 2024-10-29

## 2024-10-29 RX ORDER — POLYETHYLENE GLYCOL 3350 17 G/17G
1 POWDER, FOR SOLUTION ORAL
Status: DISCONTINUED | OUTPATIENT
Start: 2024-10-29 | End: 2024-11-06 | Stop reason: HOSPADM

## 2024-10-29 RX ADMIN — AMPICILLIN AND SULBACTAM 3 G: 1; 2 INJECTION, POWDER, FOR SOLUTION INTRAMUSCULAR; INTRAVENOUS at 19:54

## 2024-10-29 RX ADMIN — ACETAMINOPHEN 1000 MG: 1000 INJECTION, SOLUTION INTRAVENOUS at 19:52

## 2024-10-29 RX ADMIN — AMPICILLIN AND SULBACTAM 3 G: 1; 2 INJECTION, POWDER, FOR SOLUTION INTRAMUSCULAR; INTRAVENOUS at 23:00

## 2024-10-29 RX ADMIN — AZITHROMYCIN 50 MG: 500 INJECTION, POWDER, LYOPHILIZED, FOR SOLUTION INTRAVENOUS at 22:00

## 2024-10-29 RX ADMIN — AZITHROMYCIN 500 MG: 500 INJECTION, POWDER, LYOPHILIZED, FOR SOLUTION INTRAVENOUS at 22:15

## 2024-10-29 ASSESSMENT — FIBROSIS 4 INDEX: FIB4 SCORE: 3.16

## 2024-10-30 PROBLEM — G93.40 ACUTE ENCEPHALOPATHY: Status: ACTIVE | Noted: 2024-10-30

## 2024-10-30 PROBLEM — Z71.89 ACP (ADVANCE CARE PLANNING): Status: ACTIVE | Noted: 2024-10-30

## 2024-10-30 PROBLEM — I24.89 DEMAND ISCHEMIA (HCC): Status: ACTIVE | Noted: 2024-10-30

## 2024-10-30 PROBLEM — R79.89 ELEVATED BRAIN NATRIURETIC PEPTIDE (BNP) LEVEL: Status: ACTIVE | Noted: 2024-10-30

## 2024-10-30 PROBLEM — R09.02 HYPOXIA: Status: ACTIVE | Noted: 2024-04-25

## 2024-10-30 PROBLEM — D64.9 NORMOCYTIC ANEMIA: Status: ACTIVE | Noted: 2024-10-30

## 2024-10-30 LAB
ANION GAP SERPL CALC-SCNC: 10 MMOL/L (ref 7–16)
BUN SERPL-MCNC: 11 MG/DL (ref 8–22)
CALCIUM SERPL-MCNC: 8.1 MG/DL (ref 8.4–10.2)
CHLORIDE SERPL-SCNC: 107 MMOL/L (ref 96–112)
CO2 SERPL-SCNC: 24 MMOL/L (ref 20–33)
CREAT SERPL-MCNC: 1.07 MG/DL (ref 0.5–1.4)
EKG IMPRESSION: NORMAL
ERYTHROCYTE [DISTWIDTH] IN BLOOD BY AUTOMATED COUNT: 57 FL (ref 35.9–50)
GFR SERPLBLD CREATININE-BSD FMLA CKD-EPI: 73 ML/MIN/1.73 M 2
GLUCOSE SERPL-MCNC: 88 MG/DL (ref 65–99)
HCT VFR BLD AUTO: 39.3 % (ref 42–52)
HGB BLD-MCNC: 12.6 G/DL (ref 14–18)
MCH RBC QN AUTO: 29.4 PG (ref 27–33)
MCHC RBC AUTO-ENTMCNC: 32.1 G/DL (ref 32.3–36.5)
MCV RBC AUTO: 91.8 FL (ref 81.4–97.8)
PLATELET # BLD AUTO: 115 K/UL (ref 164–446)
PMV BLD AUTO: 9 FL (ref 9–12.9)
POTASSIUM SERPL-SCNC: 4 MMOL/L (ref 3.6–5.5)
RBC # BLD AUTO: 4.28 M/UL (ref 4.7–6.1)
SODIUM SERPL-SCNC: 141 MMOL/L (ref 135–145)
TROPONIN T SERPL-MCNC: 29 NG/L (ref 6–19)
WBC # BLD AUTO: 8.7 K/UL (ref 4.8–10.8)

## 2024-10-30 PROCEDURE — 85027 COMPLETE CBC AUTOMATED: CPT

## 2024-10-30 PROCEDURE — 99233 SBSQ HOSP IP/OBS HIGH 50: CPT | Mod: 25 | Performed by: HOSPITALIST

## 2024-10-30 PROCEDURE — 94660 CPAP INITIATION&MGMT: CPT

## 2024-10-30 PROCEDURE — 94760 N-INVAS EAR/PLS OXIMETRY 1: CPT

## 2024-10-30 PROCEDURE — 99497 ADVNCD CARE PLAN 30 MIN: CPT | Performed by: HOSPITALIST

## 2024-10-30 PROCEDURE — 84484 ASSAY OF TROPONIN QUANT: CPT

## 2024-10-30 PROCEDURE — 93005 ELECTROCARDIOGRAM TRACING: CPT | Performed by: HOSPITALIST

## 2024-10-30 PROCEDURE — 36415 COLL VENOUS BLD VENIPUNCTURE: CPT

## 2024-10-30 PROCEDURE — 96366 THER/PROPH/DIAG IV INF ADDON: CPT

## 2024-10-30 PROCEDURE — 80048 BASIC METABOLIC PNL TOTAL CA: CPT

## 2024-10-30 PROCEDURE — 700111 HCHG RX REV CODE 636 W/ 250 OVERRIDE (IP): Mod: JZ | Performed by: HOSPITALIST

## 2024-10-30 PROCEDURE — A9270 NON-COVERED ITEM OR SERVICE: HCPCS | Performed by: HOSPITALIST

## 2024-10-30 PROCEDURE — 700105 HCHG RX REV CODE 258: Performed by: HOSPITALIST

## 2024-10-30 PROCEDURE — 94640 AIRWAY INHALATION TREATMENT: CPT

## 2024-10-30 PROCEDURE — 99999 EKG: CPT | Performed by: HOSPITALIST

## 2024-10-30 PROCEDURE — 700102 HCHG RX REV CODE 250 W/ 637 OVERRIDE(OP): Performed by: HOSPITALIST

## 2024-10-30 PROCEDURE — 770020 HCHG ROOM/CARE - TELE (206)

## 2024-10-30 PROCEDURE — 700101 HCHG RX REV CODE 250: Performed by: HOSPITALIST

## 2024-10-30 RX ORDER — IPRATROPIUM BROMIDE AND ALBUTEROL SULFATE 2.5; .5 MG/3ML; MG/3ML
SOLUTION RESPIRATORY (INHALATION)
Status: DISPENSED
Start: 2024-10-30 | End: 2024-10-30

## 2024-10-30 RX ORDER — ENOXAPARIN SODIUM 100 MG/ML
40 INJECTION SUBCUTANEOUS DAILY
Status: DISCONTINUED | OUTPATIENT
Start: 2024-10-30 | End: 2024-11-06 | Stop reason: HOSPADM

## 2024-10-30 RX ORDER — IPRATROPIUM BROMIDE AND ALBUTEROL SULFATE 2.5; .5 MG/3ML; MG/3ML
3 SOLUTION RESPIRATORY (INHALATION)
Status: COMPLETED | OUTPATIENT
Start: 2024-10-30 | End: 2024-10-30

## 2024-10-30 RX ADMIN — DORZOLAMIDE HYDROCHLORIDE AND TIMOLOL MALEATE 1 DROP: 20; 5 SOLUTION/ DROPS OPHTHALMIC at 17:28

## 2024-10-30 RX ADMIN — ASPIRIN 81 MG 81 MG: 81 TABLET ORAL at 05:25

## 2024-10-30 RX ADMIN — AMPICILLIN AND SULBACTAM 3 G: 1; 2 INJECTION, POWDER, FOR SOLUTION INTRAMUSCULAR; INTRAVENOUS at 05:25

## 2024-10-30 RX ADMIN — Medication 500 MG: at 17:27

## 2024-10-30 RX ADMIN — ENOXAPARIN SODIUM 40 MG: 100 INJECTION SUBCUTANEOUS at 17:28

## 2024-10-30 RX ADMIN — ARIPIPRAZOLE 10 MG: 10 TABLET ORAL at 05:25

## 2024-10-30 RX ADMIN — AMPICILLIN AND SULBACTAM 3 G: 1; 2 INJECTION, POWDER, FOR SOLUTION INTRAMUSCULAR; INTRAVENOUS at 11:00

## 2024-10-30 RX ADMIN — SENNOSIDES AND DOCUSATE SODIUM 2 TABLET: 50; 8.6 TABLET ORAL at 05:25

## 2024-10-30 RX ADMIN — IPRATROPIUM BROMIDE AND ALBUTEROL SULFATE 3 ML: .5; 3 SOLUTION RESPIRATORY (INHALATION) at 06:09

## 2024-10-30 RX ADMIN — TAMSULOSIN HYDROCHLORIDE 0.4 MG: 0.4 CAPSULE ORAL at 09:30

## 2024-10-30 RX ADMIN — AMPICILLIN AND SULBACTAM 3 G: 1; 2 INJECTION, POWDER, FOR SOLUTION INTRAMUSCULAR; INTRAVENOUS at 17:28

## 2024-10-30 RX ADMIN — AMPICILLIN AND SULBACTAM 3 G: 1; 2 INJECTION, POWDER, FOR SOLUTION INTRAMUSCULAR; INTRAVENOUS at 22:28

## 2024-10-30 RX ADMIN — LATANOPROST 1 DROP: 50 SOLUTION OPHTHALMIC at 20:40

## 2024-10-30 RX ADMIN — AZITHROMYCIN DIHYDRATE 500 MG: 250 TABLET ORAL at 17:28

## 2024-10-30 ASSESSMENT — LIFESTYLE VARIABLES
ON A TYPICAL DAY WHEN YOU DRINK ALCOHOL HOW MANY DRINKS DO YOU HAVE: 0
DOES PATIENT WANT TO STOP DRINKING: NO
HAVE PEOPLE ANNOYED YOU BY CRITICIZING YOUR DRINKING: NO
AVERAGE NUMBER OF DAYS PER WEEK YOU HAVE A DRINK CONTAINING ALCOHOL: 0
EVER FELT BAD OR GUILTY ABOUT YOUR DRINKING: NO
EVER HAD A DRINK FIRST THING IN THE MORNING TO STEADY YOUR NERVES TO GET RID OF A HANGOVER: NO
CONSUMPTION TOTAL: NEGATIVE
ALCOHOL_USE: NO
HOW MANY TIMES IN THE PAST YEAR HAVE YOU HAD 5 OR MORE DRINKS IN A DAY: 0
TOTAL SCORE: 0
HAVE YOU EVER FELT YOU SHOULD CUT DOWN ON YOUR DRINKING: NO
TOTAL SCORE: 0
TOTAL SCORE: 0

## 2024-10-30 ASSESSMENT — COGNITIVE AND FUNCTIONAL STATUS - GENERAL
SUGGESTED CMS G CODE MODIFIER DAILY ACTIVITY: CL
STANDING UP FROM CHAIR USING ARMS: A LOT
MOBILITY SCORE: 12
HELP NEEDED FOR BATHING: A LOT
SUGGESTED CMS G CODE MODIFIER MOBILITY: CL
TURNING FROM BACK TO SIDE WHILE IN FLAT BAD: A LITTLE
CLIMB 3 TO 5 STEPS WITH RAILING: TOTAL
MOVING FROM LYING ON BACK TO SITTING ON SIDE OF FLAT BED: A LOT
PERSONAL GROOMING: A LOT
DRESSING REGULAR LOWER BODY CLOTHING: A LOT
EATING MEALS: A LITTLE
DRESSING REGULAR UPPER BODY CLOTHING: A LOT
DAILY ACTIVITIY SCORE: 13
WALKING IN HOSPITAL ROOM: A LOT
MOVING TO AND FROM BED TO CHAIR: A LOT
TOILETING: A LOT

## 2024-10-30 ASSESSMENT — ENCOUNTER SYMPTOMS
COUGH: 1
SORE THROAT: 0
SPUTUM PRODUCTION: 1
DEPRESSION: 0
FOCAL WEAKNESS: 0
EYE DISCHARGE: 0
STRIDOR: 0
DOUBLE VISION: 0
VOMITING: 0
HEADACHES: 0
SHORTNESS OF BREATH: 1
FLANK PAIN: 0
PALPITATIONS: 0
BRUISES/BLEEDS EASILY: 0
MYALGIAS: 0
BLURRED VISION: 0
WEAKNESS: 0
FEVER: 1
CHILLS: 1
NERVOUS/ANXIOUS: 0
NAUSEA: 0
DIZZINESS: 0
NECK PAIN: 0
EYE REDNESS: 0
INSOMNIA: 0
ABDOMINAL PAIN: 0

## 2024-10-30 ASSESSMENT — SOCIAL DETERMINANTS OF HEALTH (SDOH)
WITHIN THE LAST YEAR, HAVE YOU BEEN HUMILIATED OR EMOTIONALLY ABUSED IN OTHER WAYS BY YOUR PARTNER OR EX-PARTNER?: NO
WITHIN THE LAST YEAR, HAVE YOU BEEN AFRAID OF YOUR PARTNER OR EX-PARTNER?: NO
WITHIN THE LAST YEAR, HAVE TO BEEN RAPED OR FORCED TO HAVE ANY KIND OF SEXUAL ACTIVITY BY YOUR PARTNER OR EX-PARTNER?: NO
WITHIN THE LAST YEAR, HAVE YOU BEEN KICKED, HIT, SLAPPED, OR OTHERWISE PHYSICALLY HURT BY YOUR PARTNER OR EX-PARTNER?: NO

## 2024-10-30 ASSESSMENT — PATIENT HEALTH QUESTIONNAIRE - PHQ9
1. LITTLE INTEREST OR PLEASURE IN DOING THINGS: NOT AT ALL
2. FEELING DOWN, DEPRESSED, IRRITABLE, OR HOPELESS: NOT AT ALL
SUM OF ALL RESPONSES TO PHQ9 QUESTIONS 1 AND 2: 0

## 2024-10-30 ASSESSMENT — FIBROSIS 4 INDEX
FIB4 SCORE: 4.26
FIB4 SCORE: 4.26

## 2024-10-30 NOTE — ASSESSMENT & PLAN NOTE
Patient had low-grade fever 100.9 F on 10/29  Continuing Unasyn and azithromycin  I am checking procalcitonin  I reviewed chest x-ray, showed left pleural effusion and possible consolidation hidden  I am checking D-dimer in the morning, may need a CTA or CT chest with contrast

## 2024-10-30 NOTE — PROGRESS NOTES
Telemetry Shift Summary     Per monitor tech:  Rhythm SR  HR Range 78-82  Ectopy rPVC  Measurements 0.16/0.08/0.34      Normal Values  Rhythm SR  HR Range:   Measurements: 0.12-0.20/0.06-0.10/0.30-0.52

## 2024-10-30 NOTE — ASSESSMENT & PLAN NOTE
The patient is lethargic but easily arousable. Alert and oriented x 3-4 when awake  Likely metabolic likely secondary to hypoxia and pneumonia  Anticipate improvement with correcting/treating pneumonia and hypoxia  Continue treatment for pneumonia and hypoxia

## 2024-10-30 NOTE — ASSESSMENT & PLAN NOTE
-Likely exacerbated by underlying pneumonia.  He is currently on 2 L of oxygen via nasal cannula to maintain O2 saturations above 90%.  Per ERP this is not his baseline.  Patient was started on IV antibiotics.  Plan as above.  RT to consult and monitor as well, provide nebulization therapy hoping he will improve oxygen demand in the morning.    10/30/2024   Saturating well on 1-2 L of oxygen [uses oxygen only at night]    Continue to treat pneumonia with intravenous antibiotics.  Oxygen as needed, Respiratory protocol, Bronchodilators, Incentive spirometry   Patient BNP is elevated.  I will check an echocardiography to rule out heart failure

## 2024-10-30 NOTE — ED NOTES
Pt having increased work of breathing, Dr Rascon notified and RT contacted to complete breathing treatment at bedside.

## 2024-10-30 NOTE — ED PROVIDER NOTES
ED Provider Note    CHIEF COMPLAINT  Chief Complaint   Patient presents with    Shortness of Breath     Home health nurse noted O2 at 85%.  Has a cough x few days.         EXTERNAL RECORDS REVIEWED  Outpatient Notes Marron April, the patient has a history of CML, obstructive sleep apnea, glaucoma    HPI/ROS  LIMITATION TO HISTORY   Select: Altered mental status / Confusion  OUTSIDE HISTORIAN(S):  EMS bedside report    Austin Woodard is a 74 y.o. male who presents to the ED secondary to shortness of breath.  The patient started feeling short of breath yesterday, fevers today, the patient is very tired falls asleep easily.  States he does feel short of breath, no chest pains, abdominal pains, nausea vomiting.  He does have a history of CML on oral leukemia.  History is limited secondary to altered mental status    PAST MEDICAL HISTORY   has a past medical history of Bowel habit changes, Glaucoma, Hypertension, Psychiatric problem, Renal disorder, Shortness of breath, Unspecified cataract, Urinary bladder disorder, and Urinary incontinence (01/02/2018).    SURGICAL HISTORY   has a past surgical history that includes cataract phaco with iol; hchg implant glaucoma baerveldt; rotator cuff repair; other orthopedic surgery; other; baerveldt implant (Right, 9/4/2015); and penetrating keratoplasty (Right, 1/30/2018).    FAMILY HISTORY  History reviewed. No pertinent family history.    SOCIAL HISTORY  Social History     Tobacco Use    Smoking status: Never    Smokeless tobacco: Never   Vaping Use    Vaping status: Never Used   Substance and Sexual Activity    Alcohol use: No    Drug use: No    Sexual activity: Not on file       CURRENT MEDICATIONS  Home Medications    **Home medications have not yet been reviewed for this encounter**       Audit from Redirected Encounters    **Home medications have not yet been reviewed for this encounter**         ALLERGIES  Allergies   Allergen Reactions    Celexa Hives     And isael      "Zoloft Hives     [facility]       PHYSICAL EXAM  VITAL SIGNS: BP (!) 159/79   Pulse 89   Temp (!) 38.3 °C (100.9 °F) (Temporal)   Resp (!) 44   Ht 1.727 m (5' 8\")   Wt 95.3 kg (210 lb)   SpO2 93%   BMI 31.93 kg/m²    Well-developed male 74-year-old male who appears in moderate distress, ill-appearing  Atraumatic, normocephalic, slightly dry mucous membranes  Eyes with hazy corneas  Neck is supple  Tachycardic  Crackles throughout bilateral lung fields, tachypneic  Abdomen soft nontender  Neuro confused but will answer simple questions  Trace lower extremity edema    EKG/LABS  Results for orders placed or performed during the hospital encounter of 10/29/24   Lactic Acid    Collection Time: 10/29/24  7:16 PM   Result Value Ref Range    Lactic Acid 1.6 0.5 - 2.0 mmol/L   CBC with Differential    Collection Time: 10/29/24  7:16 PM   Result Value Ref Range    WBC 11.9 (H) 4.8 - 10.8 K/uL    RBC 4.26 (L) 4.70 - 6.10 M/uL    Hemoglobin 12.5 (L) 14.0 - 18.0 g/dL    Hematocrit 37.6 (L) 42.0 - 52.0 %    MCV 88.3 81.4 - 97.8 fL    MCH 29.3 27.0 - 33.0 pg    MCHC 33.2 32.3 - 36.5 g/dL    RDW 54.4 (H) 35.9 - 50.0 fL    Platelet Count 130 (L) 164 - 446 K/uL    MPV 8.9 (L) 9.0 - 12.9 fL    Neutrophils-Polys 70.90 44.00 - 72.00 %    Lymphocytes 18.70 (L) 22.00 - 41.00 %    Monocytes 9.70 0.00 - 13.40 %    Eosinophils 0.20 0.00 - 6.90 %    Basophils 0.20 0.00 - 1.80 %    Immature Granulocytes 0.30 0.00 - 0.90 %    Nucleated RBC 0.00 0.00 - 0.20 /100 WBC    Neutrophils (Absolute) 8.45 (H) 1.82 - 7.42 K/uL    Lymphs (Absolute) 2.23 1.00 - 4.80 K/uL    Monos (Absolute) 1.15 (H) 0.00 - 0.85 K/uL    Eos (Absolute) 0.02 0.00 - 0.51 K/uL    Baso (Absolute) 0.02 0.00 - 0.12 K/uL    Immature Granulocytes (abs) 0.03 0.00 - 0.11 K/uL    NRBC (Absolute) 0.00 K/uL   Complete Metabolic Panel    Collection Time: 10/29/24  7:16 PM   Result Value Ref Range    Sodium 137 135 - 145 mmol/L    Potassium 4.1 3.6 - 5.5 mmol/L    Chloride 103 96 - 112 " mmol/L    Co2 24 20 - 33 mmol/L    Anion Gap 10.0 7.0 - 16.0    Glucose 139 (H) 65 - 99 mg/dL    Bun 13 8 - 22 mg/dL    Creatinine 1.22 0.50 - 1.40 mg/dL    Calcium 8.6 8.4 - 10.2 mg/dL    Correct Calcium 9.0 8.5 - 10.5 mg/dL    AST(SGOT) 26 12 - 45 U/L    ALT(SGPT) 15 2 - 50 U/L    Alkaline Phosphatase 119 (H) 30 - 99 U/L    Total Bilirubin 0.6 0.1 - 1.5 mg/dL    Albumin 3.5 3.2 - 4.9 g/dL    Total Protein 6.9 6.0 - 8.2 g/dL    Globulin 3.4 1.9 - 3.5 g/dL    A-G Ratio 1.0 g/dL   CoV-2, Flu A/B, And RSV by PCR ([place])    Collection Time: 10/29/24  7:16 PM    Specimen: Respirate   Result Value Ref Range    Influenza virus A RNA Negative Negative    Influenza virus B, PCR Negative Negative    RSV, PCR Negative Negative    SARS-CoV-2 by PCR NotDetected     SARS-CoV-2 Source NP Swab    Troponin    Collection Time: 10/29/24  7:16 PM   Result Value Ref Range    Troponin T 28 (H) 6 - 19 ng/L   proBrain Natriuretic Peptide, NT    Collection Time: 10/29/24  7:16 PM   Result Value Ref Range    NT-proBNP 625 (H) 0 - 125 pg/mL   ESTIMATED GFR    Collection Time: 10/29/24  7:16 PM   Result Value Ref Range    GFR (CKD-EPI) 63 >60 mL/min/1.73 m 2   Urinalysis    Collection Time: 10/29/24  8:55 PM    Specimen: Urine   Result Value Ref Range    Color Yellow     Character Clear     Specific Gravity 1.025 <1.035    Ph 6.0 5.0 - 8.0    Glucose Negative Negative mg/dL    Ketones Negative Negative mg/dL    Protein 30 (A) Negative mg/dL    Bilirubin Negative Negative    Nitrite Negative Negative    Leukocyte Esterase Negative Negative    Occult Blood Trace (A) Negative    Micro Urine Req Microscopic    URINE MICROSCOPIC (W/UA)    Collection Time: 10/29/24  8:55 PM   Result Value Ref Range    WBC 11-20 /hpf    Bacteria Few (A) None /hpf    Epithelial Cells Few Few /hpf    Mucous Threads Few /hpf    Amorphous Crystal Present /hpf      I have independently interpreted this EKG    RADIOLOGY/PROCEDURES   I have independently interpreted the  diagnostic imaging associated with this visit and am waiting the final reading from the radiologist.   My preliminary interpretation is as follows: Bilateral edema possible pneumonia    Radiologist interpretation:  DX-CHEST-PORTABLE (1 VIEW)   Final Result      1.  Chronic blunting of the left costophrenic angle, pleural thickening and/or small effusion.   2.  Probable small right pleural effusion.   3.  Bibasilar atelectasis and/or edema. Correlate clinically for infection.          COURSE & MEDICAL DECISION MAKING    ASSESSMENT, COURSE AND PLAN  Care Narrative: Coming in with shortness of breath, febrile, hypoxic, concern about sepsis, COVID, pulmonary edema, urinary tract infection.  We will get a sepsis workup give the patient IV Tylenol for his fever.    Chest x-ray shows likely pneumonia versus edema, the patient does have a leukocytosis, hypoxia, febrile, tachypnea.  The patient is septic, give the patient some IV fluids, IV antibiotics, oxygen, the patient will need to be admitted to the hospital.  Discussed case with Dr. Rascon for hospitalization.    CRITICAL CARE  The very real possibilty of a deterioration of this patient's condition required the highest level of my preparedness for sudden, emergent intervention.  I provided critical care services, which included medication orders, frequent reevaluations of the patient's condition and response to treatment, ordering and reviewing test results, and discussing the case with various consultants.  The critical care time associated with the care of the patient was 35 minutes. Review chart for interventions. This time is exclusive of any other billable procedures.       Patient received a liter of IV fluids from [facility].  Did not give the patient more IV fluids due to the national shortage and IV fluids.        DISPOSITION AND DISCUSSIONS  I have discussed management of the patient with the following physicians and SHABBIR's: Hospitalist    FINAL DIAGNOSIS  1. Sepsis  with acute hypoxic respiratory failure without septic shock, due to unspecified organism (HCC)    2. Pneumonia of both lungs due to infectious organism, unspecified part of lung         Electronically signed by: Cresencio Doll M.D., 10/29/2024 7:39 PM

## 2024-10-30 NOTE — ED NOTES
Report called to floor.  No further questions  Update called to [facility]  Transport pending

## 2024-10-30 NOTE — ED NOTES
"Received report from Elaina JUAREZ. RN states patient daughter called stating she was his POA, family member told RN patient takes Sprycel for leukemia and always has fluid in the right upper lung. Patient receives care via home health. Home health nurse told family member they believe his oxygen sats were 85 on room air and believes \"there is more\". Patient has IV in place from [facility]. Patient has fluids attached. Patient tolerated blood draw. Patient is sitting in laying in bed attached to 2l of oxygen via NC. Patient is slow to respond to questions but does answer appropriately.   "

## 2024-10-30 NOTE — ED NOTES
Medication reconciliation process completed by interviewing the patient's daughter Kristine over the phone.   No antibiotics in the last 30 days.  Anticoagulants (rivaroxaban, apixaban, edoxaban, dabigatran, enoxaparin) taken in the last 14 days? no  Allergies have been verified.    Hermila, DarriusD, [facility], BCPS

## 2024-10-30 NOTE — ASSESSMENT & PLAN NOTE
-Initial troponin is 20 ng/L.  This is likely demand ischemia from above but I will repeat troponin test in the morning and monitor him on telemetry.-Added EKG    10/30/2024   Troponin trend 27, 28 and 29.  No chest pain.    This is likely secondary to demand ischemia secondary to hypoxia, and secondary to reduced renal clearance secondary to CKD     Monitoring on telemetry

## 2024-10-30 NOTE — H&P
Hospital Medicine History & Physical Note    Date of Service  10/29/2024    Primary Care Physician  FRANCY Schaefer.    Consultants  None    Code Status  Full Code    Chief Complaint  Chief Complaint   Patient presents with    Shortness of Breath     Home health nurse noted O2 at 85%.  Has a cough x few days.         History of Presenting Illness  Poor Historian    Austin Woodard is a 74 y.o. male, with h/o CML on immunotherapy, HTN, Depression, glaucoma and bipolar/ depression, who requires total assist with ADLs therefore residing in a care home, brought to the ED today on 10/29/2024 for evaluation of worsening shortness of breath, hypoxia, cough and fever.  This started 2 days ago and getting progressively worse.  He was hypoxic to 85% on arrival and currently requiring 2 L of oxygen via nasal cannula..    I discussed the plan of care with patient and ERP Dr. Doll .    Review of Systems  Review of Systems   Constitutional:  Positive for chills, fever and malaise/fatigue.   HENT:  Negative for congestion and sore throat.    Eyes:  Negative for blurred vision and double vision.   Respiratory:  Positive for cough, sputum production and shortness of breath.    Cardiovascular:  Negative for chest pain and palpitations.   Gastrointestinal:  Negative for nausea and vomiting.   Genitourinary:  Negative for dysuria and urgency.   Musculoskeletal:  Negative for myalgias and neck pain.   Skin:  Negative for itching and rash.   Neurological:  Negative for dizziness, weakness and headaches.   Endo/Heme/Allergies:  Does not bruise/bleed easily.   Psychiatric/Behavioral:  Negative for depression. The patient does not have insomnia.        Past Medical History   has a past medical history of Bowel habit changes, Glaucoma, Hypertension, Psychiatric problem, Renal disorder, Shortness of breath, Unspecified cataract, Urinary bladder disorder, and Urinary incontinence (01/02/2018).    Surgical History   has a past  surgical history that includes cataract phaco with iol; hchg implant glaucoma baerveldt; rotator cuff repair; other orthopedic surgery; other; baerveldt implant (Right, 9/4/2015); and penetrating keratoplasty (Right, 1/30/2018).     Family History  Reviewed and not pertinent  Family history reviewed with patient. There is no family history that is pertinent to the chief complaint.     Social History   reports that he has never smoked. He has never used smokeless tobacco. He reports that he does not drink alcohol and does not use drugs.    Allergies  Allergies   Allergen Reactions    Celexa Hives     And paranoia     Zoloft Hives     paranoia       Medications  Prior to Admission Medications   Prescriptions Last Dose Informant Patient Reported? Taking?   ARIPiprazole (ABILIFY) 10 MG Tab   No No   Sig: TAKE 1 TABLET BY MOUTH ONCE DAILY   ASPIRIN LOW DOSE 81 MG Chew Tab chewable tablet   No No   Sig: CHEW 1 TABLET BY MOUTH ONCE DAILY   Cholecalciferol (VITAMIN D) 2000 UNIT Tab   No No   Sig: TAKE 1 TABLET BY MOUTH ONCE DAILY (PM)   Cyanocobalamin (VITAMIN B-12) 1000 MCG Tab   No No   Sig: Take 1 Tablet by mouth every day.   Lidocaine-Glycerin (PREPARATION H EX)   Yes No   Sig: Apply  topically 2 times a day as needed.   Mirabegron ER (MYRBETRIQ) 50 MG TABLET SR 24 HR   Yes No   Sig: Take 50 mg by mouth every day at 6 PM.   allopurinol (ZYLOPRIM) 300 MG Tab   No No   Sig: Take 1 Tablet by mouth every day.   artificial tears 1.4 % Solution   Yes No   Sig: Administer 1 Drop into both eyes as needed.   benzonatate (TESSALON) 100 MG Cap   No No   Sig: Take 1 Capsule by mouth 3 times a day as needed for Cough.   chlorhexidine (PERIDEX) 0.12 % Solution   No No   Sig: SWISH AND SPIT 15MLS BY MOUTH TWICE DAILY   dasatinib (SPRYCEL) 50 MG tablet   Yes No   Sig: Take 50 mg by mouth every day.   docusate sodium (COLACE) 100 MG Cap   No No   Sig: TAKE 1 CAPSULE BY MOUTH ONCE DAILY   dorzolamide-timolol (COSOPT) 22.3-6.8 MG/ML  Solution   Yes No   Sig: Administer 1 Drop into the right eye 2 times a day.   guaifenesin dextromethorphan sugar free (ROBAFEN DM COUGH)  MG/5ML Liquid soln   No No   Sig: Take 10 mL by mouth every 6 hours as needed for Cough.   ibuprofen (MOTRIN) 200 MG Tab   No No   Sig: Take 1 Tablet by mouth every 6 hours as needed for Mild Pain. Take with food. Call provider if pain persists.   latanoprost (XALATAN) 0.005 % Solution   Yes No   Sig: Administer 1 Drop into the right eye every evening.   loperamide (IMODIUM) 2 MG Cap   No No   Sig: Take 1 Capsule by mouth 4 times a day as needed for Diarrhea.   niacin 500 MG Tab   No No   Sig: TAKE 1 TABLET BY MOUTH EVERY EVENING   phosphorus (PHOSPHA 250 NEUTRAL) 250 MG tablet   No No   Sig: TAKE 1 TABLET BY MOUTH TWICE DAILY   polyethylene glycol/lytes (MIRALAX) 17 g Pack   No No   Sig: Take 1 Packet by mouth 1 time a day as needed (for no BM in 24 hours).   tamsulosin (FLOMAX) 0.4 MG capsule   No No   Sig: TAKE 1 CAPSULE BY MOUTH ONCE DAILY (PM)   triamcinolone acetonide (KENALOG) 0.1 % Cream   No No   Sig: APPLY A SMALL AMOUNT TO AFFECTED AREA TWICE A DAY AS NEEDED FOR RASH. DO NOT EXCEED 14 DAYS      Facility-Administered Medications: None       Physical Exam  Temp:  [38.3 °C (100.9 °F)] 38.3 °C (100.9 °F)  Pulse:  [89] 89  Resp:  [44] 44  BP: (159)/(79) 159/79  SpO2:  [93 %] 93 %  Blood Pressure : (!) 159/79   Temperature: (!) 38.3 °C (100.9 °F)   Pulse: 89   Respiration: (!) 44   Pulse Oximetry: 93 %       Physical Exam  Constitutional:       Appearance: Normal appearance.   HENT:      Head: Normocephalic and atraumatic.      Nose: Nose normal.      Mouth/Throat:      Mouth: Mucous membranes are moist.      Pharynx: Oropharynx is clear.   Eyes:      Extraocular Movements: Extraocular movements intact.      Pupils: Pupils are equal, round, and reactive to light.   Cardiovascular:      Rate and Rhythm: Normal rate and regular rhythm.      Pulses: Normal pulses.       Heart sounds: Normal heart sounds.   Pulmonary:      Effort: Pulmonary effort is normal.      Breath sounds: Wheezing (end expiratory) present.      Comments: Tachypneic, shallow breathing and mild use of accessory muscles  Abdominal:      General: Abdomen is flat. Bowel sounds are normal.      Palpations: Abdomen is soft.   Musculoskeletal:      Cervical back: Normal range of motion and neck supple.   Skin:     General: Skin is warm and dry.   Neurological:      General: No focal deficit present.      Mental Status: He is alert and oriented to person, place, and time.   Psychiatric:         Mood and Affect: Mood normal.         Behavior: Behavior normal.         Laboratory:  Recent Labs     10/29/24  1916   WBC 11.9*   RBC 4.26*   HEMOGLOBIN 12.5*   HEMATOCRIT 37.6*   MCV 88.3   MCH 29.3   MCHC 33.2   RDW 54.4*   PLATELETCT 130*   MPV 8.9*     Recent Labs     10/29/24  1916   SODIUM 137   POTASSIUM 4.1   CHLORIDE 103   CO2 24   GLUCOSE 139*   BUN 13   CREATININE 1.22   CALCIUM 8.6     Recent Labs     10/29/24  1916   ALTSGPT 15   ASTSGOT 26   ALKPHOSPHAT 119*   TBILIRUBIN 0.6   GLUCOSE 139*         Recent Labs     10/29/24  1916   NTPROBNP 625*         Recent Labs     10/29/24  1916   TROPONINT 28*       Imaging:  DX-CHEST-PORTABLE (1 VIEW)   Final Result      1.  Chronic blunting of the left costophrenic angle, pleural thickening and/or small effusion.   2.  Probable small right pleural effusion.   3.  Bibasilar atelectasis and/or edema. Correlate clinically for infection.          X-Ray:  I have personally reviewed the images and compared with prior images. and My impression is: As x-ray of the chest showed right pleural effusion and bibasilar atelectasis.    Assessment/Plan:  Justification for Admission Status  I anticipate this patient will require at least two midnights for appropriate medical management, necessitating inpatient admission because 74-year-old male, on immunotherapy for CML who is coming with  multifocal pneumonia and SIRS      * Multifocal pneumonia- (present on admission)  Assessment & Plan  -Inpatient status on telemetry.  -Patient has SIRS criteria with fever, tachycardia and tachypnea.  His initial WBC is 11.9.  -Chest x-ray showing bibasilar atelectasis and/or edema correlate clinically for infection.  -He is immune suppressed undergoing immunotherapy for history of CML.  -He will need IV antibiotics.  He also is currently on 2 L of oxygen via nasal cannula to maintain of saturations above 90%.  -There is no endorgan dysfunction.  He is not septic per sepsis 3 criteria on admission.  -RT consult/protocol initiated      Hypoxia- (present on admission)  Assessment & Plan  -Likely exacerbated by underlying pneumonia.  He is currently on 2 L of oxygen via nasal cannula to maintain O2 saturations above 90%.  Per ERP this is not his baseline.  Patient was started on IV antibiotics.  Plan as above.  RT to consult and monitor as well, provide nebulization therapy hoping he will improve oxygen demand in the morning.    Thrombocytopenia (HCC)- (present on admission)  Assessment & Plan  -Platelets are 130.  Will monitor CBC in the morning.    Chronic myelocytic leukemia (HCC)- (present on admission)  Assessment & Plan   -WBC on admission is 11.9.  Patient is on dasatinib.  -Monitor CBC in am  -Follows with Dr. Valdez.Will need to discuss with pharmacy plant to continue medication in am    Demand ischemia (HCC)  Assessment & Plan  -Initial troponin is 20 ng/L.  This is likely demand ischemia from above but I will repeat troponin test in the morning and monitor him on telemetry.-Added EKG    Normocytic anemia  Assessment & Plan  -Initial hemoglobin is 12.5.  Patient is not bleeding.  Closely monitor CBC in the morning.    SALENA (obstructive sleep apnea)- (present on admission)  Assessment & Plan  -I ordered nocturnal CPAP    CPAP (continuous positive airway pressure) dependence- (present on admission)  Assessment  & Plan  - Added nocturnal CPAP.  Patient currently on 2 L of supplemental oxygen via nasal cannula.    Bipolar 1 disorder (HCC)- (present on admission)  Assessment & Plan  -Continue Abilify    Benign prostatic hyperplasia with lower urinary tract symptoms- (present on admission)  Assessment & Plan  Continue tamsulosin      Primary open angle glaucoma- (present on admission)  Assessment & Plan  - Pharmacy verified glaucoma medications with daughter over the phone which were order for tomorrow morning        VTE prophylaxis: SCDs/TEDs

## 2024-10-30 NOTE — PROGRESS NOTES
4 Eyes Skin Assessment Completed by ANN De La Cruz and ANN Arriaga.    Head WDL BL eye drainage  Ears WDL  Nose WDL  Mouth WDL  Neck WDL  Breast/Chest WDL  Shoulder Blades WDL  Spine WDL  (R) Arm/Elbow/Hand WDL  (L) Arm/Elbow/Hand WDL  Abdomen WDL  Groin WDL  Scrotum/Coccyx/Buttocks WDL  (R) Leg Scab  (L) Leg Scab  (R) Heel/Foot/Toe WDL  (L) Heel/Foot/Toe WDL          Devices In Places Nasal Cannula      Interventions In Place Q2 Turns and Low Air Loss Mattress    Possible Skin Injury No    Pictures Uploaded Into Epic Yes  Wound Consult Placed N/A  RN Wound Prevention Protocol Ordered No

## 2024-10-30 NOTE — ASSESSMENT & PLAN NOTE
WBC on admission is 11.9.  Patient is on dasatinib.  Follows with Dr. Valdez  Dasatinib use increase the risk of infections by 9% - 14%, also associated with bone marrow suppression, anemia and thrombocytopenia     Hold home Dasatinib, given current infection with pneumonia

## 2024-10-30 NOTE — DISCHARGE PLANNING
ER CM met with pt at bedside. AOx1. Pleasant. Lives in Group home [facility] .  owner Steve  Has o2 at  with [employer]. PCP PHILIP Landis. RX [facility] [facility] . Mobilty unclear from interview with pt may need evaluated. FWW at home? He is not a clear historian at present, sleepy. VM left with ER CM contact for  owner pt gave permission to call. Care Transition Team Assessment    Information Source  Orientation Level: Oriented to person  Information Given By: Patient  Informant's Name: Austin  Who is responsible for making decisions for patient? : Patient         Elopement Risk  Legal Hold: No  Ambulatory or Self Mobile in Wheelchair: No-Not an Elopement Risk    Interdisciplinary Discharge Planning  Primary Care Physician: Elvin Landis  Lives with - Patient's Self Care Capacity: Other (Comments)  Support Systems: Children, Home Care Staff  Housing / Facility: Group Home ([facility])  Do You Take your Prescribed Medications Regularly: Yes  Able to Return to Previous ADL's: Yes  Mobility Issues: Yes  Prior Services: Other (Comments) (oxygen at  [facility] since april)    Discharge Preparedness  What is your plan after discharge?: Home with help  What are your discharge supports?: Other (comment), Child  Prior Functional Level: Ambulatory, Needs Assist with Activities of Daily Living, Needs Assist with Medication Management, Uses Walker, Uses Wheelchair    Functional Assesment  Prior Functional Level: Ambulatory, Needs Assist with Activities of Daily Living, Needs Assist with Medication Management, Uses Walker, Uses Wheelchair    Finances  Prescription Coverage: Yes ([employer] [facility])                   Domestic Abuse  Have you ever been the victim of abuse or violence?: No              Anticipated Discharge Information  Discharge Disposition: Discharged to home/self care (01)

## 2024-10-30 NOTE — ED NOTES
Rounded on pt. Pt requested/given warm blankets. Pt resting comfortably with call light within reach.

## 2024-10-30 NOTE — CARE PLAN
The patient is Stable - Low risk of patient condition declining or worsening    Shift Goals  Clinical Goals: Wean 02, no s/s of sepsis  Patient Goals: Breathe better    Progress made toward(s) clinical / shift goals:  VSS, pt 02 >90% on 1 L    Patient is not progressing towards the following goals:      Problem: Knowledge Deficit - Standard  Goal: Patient and family/care givers will demonstrate understanding of plan of care, disease process/condition, diagnostic tests and medications  Outcome: Progressing  Pt educated on importance of VTE prophylaxis     Problem: Respiratory  Goal: Patient will achieve/maintain optimum respiratory ventilation and gas exchange  Outcome: Progressing

## 2024-10-30 NOTE — ED TRIAGE NOTES
"Chief Complaint   Patient presents with    Shortness of Breath     Home health nurse noted O2 at 85%.  Has a cough x few days.       BP (!) 159/79   Pulse 89   Resp (!) 44   Ht 1.727 m (5' 8\")   Wt 95.3 kg (210 lb)   SpO2 93%   BMI 31.93 kg/m²     "

## 2024-10-30 NOTE — PROGRESS NOTES
Hospital Medicine Daily Progress Note    Date of Service  10/30/2024    Chief Complaint  Shortness of breath     Hospital Course  Austin Woodard is a 74 y.o. male with a past medical history of chronic hypoxemic nocturnal respiratory failure, primary hypertension, bipolar disorder/depression chronic lymphocytic leukemia and follows with Dr. 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  Started on Unasyn and azithromycin    Interval Problem Update  Heart rate 70s-80s.  Saturating well on 1-2 L of oxygen [uses oxygen only at night according to daughter/group home owner]  Low-grade temperature overnight of 100.9  Tachypneic with a respiratory rate of 26-28  Blood cultures collected from 10/23 show no growth to date.  I will continue Unasyn and azithromycin  White count improving 8.7, down from 19.2.  Platelet count is 115, down from 130.  No evidence of gross bleeding.  Hemoglobin is stable 12.6.  Troponin trend 27, 28 and 29.  No chest pain.  This is likely secondary to demand ischemia secondary to hypoxia, and secondary to reduced renal clearance secondary to chronic kidney disease.  Dasatinib use increase the risk of infections by 9% - 14%, also associated with bone marrow suppression, anemia and thrombocytopenia   Plan to hold home dasatinib while inpatient.    Patient BNP is elevated.  I will check an echocardiography to rule out heart failure.   I called patient daughter, Kristine Woodard, I updated her about patient condition.  I discussed diagnosis, treatment plan, anticipated hospitalization duration and anticipated discharge time.  I also discussed CODE STATUS.  She confirms patient always wanted to continue as a full code.  I have discussed this patient's plan of care and discharge plan at IDT rounds today with Case Management, Nursing, Nursing leadership, and other members of the IDT team.    Consultants/Specialty  None      Code Status  Full Code    Disposition  The patient is not medically cleared.  Has pneumonia.  Will require intravenous antibiotics.  Requiring oxygen.   I have placed the appropriate orders for post-discharge needs.    Review of Systems  Review of Systems   Constitutional:  Positive for chills, fever and malaise/fatigue.   Eyes:  Negative for discharge and redness.   Respiratory:  Positive for cough and shortness of breath. Negative for stridor.    Cardiovascular:  Negative for chest pain and leg swelling.   Gastrointestinal:  Negative for abdominal pain and vomiting.   Genitourinary:  Negative for flank pain.   Musculoskeletal:  Negative for myalgias.   Skin: Negative.    Neurological:  Negative for focal weakness.   Endo/Heme/Allergies:  Does not bruise/bleed easily.   Psychiatric/Behavioral:  The patient is not nervous/anxious.       Physical Exam  Temp:  [36.7 °C (98.1 °F)-38.3 °C (100.9 °F)] 37.7 °C (99.8 °F)  Pulse:  [63-89] 79  Resp:  [20-44] 20  BP: (101-159)/(51-90) 115/56  SpO2:  [90 %-95 %] 93 %    Physical Exam  Constitutional:       General: He is not in acute distress.     Appearance: He is not ill-appearing or diaphoretic.      Comments: Lethargic but easily arousable.  Alert and oriented x 3-4 when awake   HENT:      Head: Atraumatic.      Right Ear: External ear normal.      Left Ear: External ear normal.      Nose: No congestion or rhinorrhea.      Mouth/Throat:      Mouth: Mucous membranes are dry.   Eyes:      General: No scleral icterus.        Right eye: No discharge.         Left eye: No discharge.      Pupils: Pupils are equal, round, and reactive to light.   Cardiovascular:      Rate and Rhythm: Normal rate and regular rhythm.   Pulmonary:      Breath sounds: Wheezing present.      Comments: Requiring 2 L of oxygen to achieve adequate saturation.  Exhibits speak in full sentences.  Scattered wheeze.  Tachypnea   Abdominal:      Tenderness: There is no abdominal tenderness. There is no  guarding or rebound.      Comments: Protuberant abdomen   Musculoskeletal:      Cervical back: Neck supple. No rigidity. No muscular tenderness.      Right lower leg: No edema.      Left lower leg: No edema.   Skin:     Capillary Refill: Capillary refill takes 2 to 3 seconds.      Coloration: Skin is not jaundiced or pale.   Neurological:      Mental Status: He is oriented to person, place, and time.      Coordination: Coordination normal.   Psychiatric:         Mood and Affect: Mood normal.         Behavior: Behavior normal.       Fluids    Intake/Output Summary (Last 24 hours) at 10/30/2024 1541  Last data filed at 10/30/2024 0535  Gross per 24 hour   Intake 150 ml   Output 500 ml   Net -350 ml      Laboratory  Recent Labs     10/29/24  1916 10/30/24  0300   WBC 11.9* 8.7   RBC 4.26* 4.28*   HEMOGLOBIN 12.5* 12.6*   HEMATOCRIT 37.6* 39.3*   MCV 88.3 91.8   MCH 29.3 29.4   MCHC 33.2 32.1*   RDW 54.4* 57.0*   PLATELETCT 130* 115*   MPV 8.9* 9.0     Recent Labs     10/29/24  1916 10/30/24  0300   SODIUM 137 141   POTASSIUM 4.1 4.0   CHLORIDE 103 107   CO2 24 24   GLUCOSE 139* 88   BUN 13 11   CREATININE 1.22 1.07   CALCIUM 8.6 8.1*     Recent Labs     10/29/24  1916   INR 1.25*     Imaging  DX-CHEST-PORTABLE (1 VIEW)   Final Result      1.  Chronic blunting of the left costophrenic angle, pleural thickening and/or small effusion.   2.  Probable small right pleural effusion.   3.  Bibasilar atelectasis and/or edema. Correlate clinically for infection.         Assessment/Plan  * Multifocal pneumonia- (present on admission)  Assessment & Plan  -Inpatient status on telemetry.  -Patient has SIRS criteria with fever, tachycardia and tachypnea.  His initial WBC is 11.9.  -Chest x-ray showing bibasilar atelectasis and/or edema correlate clinically for infection.  -He is immune suppressed undergoing immunotherapy for history of CML.  -He will need IV antibiotics.  He also is currently on 2 L of oxygen via nasal cannula to  maintain of saturations above 90%.  -There is no endorgan dysfunction.  He is not septic per sepsis 3 criteria on admission.  -RT consult/protocol initiated    10/30/2024   Heart rate 70s-80s. Saturating well on 1-2 L of oxygen [uses oxygen only at night]   Low-grade temperature overnight of 100.9.  White count improving 8.7, down from 19.2.  Blood cultures collected from 10/23 show no growth to date.  I will continue Unasyn and azithromycin    Acute encephalopathy- (present on admission)  Assessment & Plan  The patient is lethargic but easily arousable. Alert and oriented x 3-4 when awake  Likely metabolic likely secondary to hypoxia and pneumonia  Anticipate improvement with correcting/treating pneumonia and hypoxia  Consider further diagnostic testing /imaging if encephalopathy does not improve with above measures.     Hypoxia- (present on admission)  Assessment & Plan  -Likely exacerbated by underlying pneumonia.  He is currently on 2 L of oxygen via nasal cannula to maintain O2 saturations above 90%.  Per ERP this is not his baseline.  Patient was started on IV antibiotics.  Plan as above.  RT to consult and monitor as well, provide nebulization therapy hoping he will improve oxygen demand in the morning.    10/30/2024   Saturating well on 1-2 L of oxygen [uses oxygen only at night]    Continue to treat pneumonia with intravenous antibiotics.  Oxygen as needed, Respiratory protocol, Bronchodilators, Incentive spirometry   Patient BNP is elevated.  I will check an echocardiography to rule out heart failure    Elevated brain natriuretic peptide (BNP) level- (present on admission)  Assessment & Plan  Patient BNP is elevated.  I will check an echocardiography to rule out heart failure     Elevated troponin secondary to demand ischemia (HCC)- (present on admission)  Assessment & Plan  -Initial troponin is 20 ng/L.  This is likely demand ischemia from above but I will repeat troponin test in the morning and monitor  him on telemetry.-Added EKG    10/30/2024   Troponin trend 27, 28 and 29.  No chest pain.    This is likely secondary to demand ischemia secondary to hypoxia, and secondary to reduced renal clearance secondary to CKD     Normocytic anemia- (present on admission)  Assessment & Plan  Hemoglobin is stable 12.6.  Has thrombocytopenia platelet count is 115 today.  Continue to monitor.  I will order a follow-up H&H    CPAP (continuous positive airway pressure) dependence- (present on admission)  Assessment & Plan  - Added nocturnal CPAP.  Patient currently on 2 L of supplemental oxygen via nasal cannula.    Chronic myelocytic leukemia (HCC)- (present on admission)  Assessment & Plan  WBC on admission is 11.9.  Patient is on dasatinib.  Follows with Dr. Valdez  Dasatinib use increase the risk of infections by 9% - 14%, also associated with bone marrow suppression, anemia and thrombocytopenia   Plan to hold home dasatinib while inpatient  Continue to monitor white count with daily labs.  I will order a follow-up CBC with differential     Benign prostatic hyperplasia with lower urinary tract symptoms- (present on admission)  Assessment & Plan  Resume home tamsulosin      ACP (advance care planning)- (present on admission)  Assessment & Plan  I had a discussion with the patient and family daughter [Kristine] regarding goals of care, diagnoses, prognosis, and CODE STATUS. We discussed his  diagnoses, treatment plan, prognosis and comorbidities. The patient has advanced age of 74 years. He has a number of chronic medical problems including chronic myeloid leukemia, chronic nocturnal hypoxemic respiratory failure and glucoma.  He is being admitted for hypoxemic respiratory failure secondary to pneumonia requiring intravenous antibiotics.  At this point patient/patient daughter wants to proceed with a full code.  They are open to all forms of invasive and noninvasive diagnostic and therapeutic interventions.     SALENA (obstructive  sleep apnea)- (present on admission)  Assessment & Plan  -I ordered nocturnal CPAP    Thrombocytopenia (HCC)- (present on admission)  Assessment & Plan  -Platelets are 130.  Will monitor CBC in the morning.    10/30/2024   Platelet count is 115, down from 130.  No evidence of gross bleeding.  No evidence of gross bleeding.  Hemoglobin is stable 12.6     Bipolar 1 disorder (HCC)- (present on admission)  Assessment & Plan  Resume home aripiprazole      Primary open angle glaucoma- (present on admission)  Assessment & Plan  - Pharmacy verified glaucoma medications with daughter over the phone which were order for tomorrow morning       VTE prophylaxis: SCDs, Lovenox prophylactic dose    I had a discussion with the patient and family daughter [Kristine] regarding goals of care, diagnoses, prognosis, and CODE STATUS. We discussed his  diagnoses, treatment plan, prognosis and comorbidities. The patient has advanced age of 74 years. He has a number of chronic medical problems including chronic myeloid leukemia, chronic nocturnal hypoxemic respiratory failure and glucoma.  He is being admitted for hypoxemic respiratory failure secondary to pneumonia requiring intravenous antibiotics.  At this point patient/patient daughter wants to proceed with a full code.  They are open to all forms of invasive and noninvasive diagnostic and therapeutic interventions. I spent 16 minutes on advanced care planning in addition to the time spent managing the other medical problems.     Total time spent taking care of this patient is 67 minutes. This included review of patient chart since admission, face-to-face interview, physical examination, lab review and analysis.  In addition I called patient daughter, Kristine, and discussed patient condition, diagnoses, treatment plan and, prognosis, CODE STATUS, goals of care and anticipated hospitalization duration and discharge time.  In addition, I spoke with patient and nurse, charge nurse,  pharmacy, case management during discharge planning IDT rounds.

## 2024-10-30 NOTE — ASSESSMENT & PLAN NOTE
-Platelets are 130.  Will monitor CBC in the morning.    10/30/2024   Platelet count is 115, down from 130.  No evidence of gross bleeding.  No evidence of gross bleeding.  Hemoglobin is stable 12.6

## 2024-10-31 ENCOUNTER — APPOINTMENT (OUTPATIENT)
Dept: CARDIOLOGY | Facility: MEDICAL CENTER | Age: 74
End: 2024-10-31
Attending: HOSPITALIST
Payer: COMMERCIAL

## 2024-10-31 ENCOUNTER — APPOINTMENT (OUTPATIENT)
Dept: CARDIOLOGY | Facility: MEDICAL CENTER | Age: 74
DRG: 193 | End: 2024-10-31
Attending: HOSPITALIST
Payer: COMMERCIAL

## 2024-10-31 VITALS
DIASTOLIC BLOOD PRESSURE: 68 MMHG | RESPIRATION RATE: 19 BRPM | OXYGEN SATURATION: 93 % | HEIGHT: 68 IN | WEIGHT: 209.22 LBS | SYSTOLIC BLOOD PRESSURE: 126 MMHG | TEMPERATURE: 99.3 F | BODY MASS INDEX: 31.71 KG/M2 | HEART RATE: 92 BPM

## 2024-10-31 PROBLEM — Z75.8 DISCHARGE PLANNING ISSUES: Status: ACTIVE | Noted: 2024-10-31

## 2024-10-31 LAB
ALBUMIN SERPL BCP-MCNC: 3.3 G/DL (ref 3.2–4.9)
ALBUMIN/GLOB SERPL: 0.9 G/DL
ALP SERPL-CCNC: 107 U/L (ref 30–99)
ALT SERPL-CCNC: 18 U/L (ref 2–50)
ANION GAP SERPL CALC-SCNC: 13 MMOL/L (ref 7–16)
AST SERPL-CCNC: 41 U/L (ref 12–45)
BACTERIA UR CULT: NORMAL
BASOPHILS # BLD AUTO: 0.3 % (ref 0–1.8)
BASOPHILS # BLD: 0.02 K/UL (ref 0–0.12)
BILIRUB SERPL-MCNC: 0.8 MG/DL (ref 0.1–1.5)
BUN SERPL-MCNC: 16 MG/DL (ref 8–22)
CALCIUM ALBUM COR SERPL-MCNC: 9.4 MG/DL (ref 8.5–10.5)
CALCIUM SERPL-MCNC: 8.8 MG/DL (ref 8.4–10.2)
CHLORIDE SERPL-SCNC: 105 MMOL/L (ref 96–112)
CO2 SERPL-SCNC: 21 MMOL/L (ref 20–33)
CREAT SERPL-MCNC: 1.1 MG/DL (ref 0.5–1.4)
EOSINOPHIL # BLD AUTO: 0.26 K/UL (ref 0–0.51)
EOSINOPHIL NFR BLD: 3.3 % (ref 0–6.9)
ERYTHROCYTE [DISTWIDTH] IN BLOOD BY AUTOMATED COUNT: 55.9 FL (ref 35.9–50)
GFR SERPLBLD CREATININE-BSD FMLA CKD-EPI: 71 ML/MIN/1.73 M 2
GLOBULIN SER CALC-MCNC: 3.6 G/DL (ref 1.9–3.5)
GLUCOSE SERPL-MCNC: 90 MG/DL (ref 65–99)
HCT VFR BLD AUTO: 37.8 % (ref 42–52)
HGB BLD-MCNC: 12.5 G/DL (ref 14–18)
IMM GRANULOCYTES # BLD AUTO: 0.03 K/UL (ref 0–0.11)
IMM GRANULOCYTES NFR BLD AUTO: 0.4 % (ref 0–0.9)
LV EJECT FRACT  99904: 70
LV EJECT FRACT MOD 2C 99903: 67.09
LV EJECT FRACT MOD 4C 99902: 77.97
LV EJECT FRACT MOD BP 99901: 70.23
LYMPHOCYTES # BLD AUTO: 1.86 K/UL (ref 1–4.8)
LYMPHOCYTES NFR BLD: 23.5 % (ref 22–41)
MAGNESIUM SERPL-MCNC: 2.1 MG/DL (ref 1.5–2.5)
MCH RBC QN AUTO: 29.7 PG (ref 27–33)
MCHC RBC AUTO-ENTMCNC: 33.1 G/DL (ref 32.3–36.5)
MCV RBC AUTO: 89.8 FL (ref 81.4–97.8)
MONOCYTES # BLD AUTO: 0.85 K/UL (ref 0–0.85)
MONOCYTES NFR BLD AUTO: 10.7 % (ref 0–13.4)
NEUTROPHILS # BLD AUTO: 4.91 K/UL (ref 1.82–7.42)
NEUTROPHILS NFR BLD: 61.8 % (ref 44–72)
NRBC # BLD AUTO: 0 K/UL
NRBC BLD-RTO: 0 /100 WBC (ref 0–0.2)
PHOSPHATE SERPL-MCNC: 2.4 MG/DL (ref 2.5–4.5)
PLATELET # BLD AUTO: 144 K/UL (ref 164–446)
PMV BLD AUTO: 9.2 FL (ref 9–12.9)
POTASSIUM SERPL-SCNC: 4.2 MMOL/L (ref 3.6–5.5)
PROCALCITONIN SERPL-MCNC: 0.2 NG/ML
PROT SERPL-MCNC: 6.9 G/DL (ref 6–8.2)
RBC # BLD AUTO: 4.21 M/UL (ref 4.7–6.1)
SIGNIFICANT IND 70042: NORMAL
SITE SITE: NORMAL
SODIUM SERPL-SCNC: 139 MMOL/L (ref 135–145)
SOURCE SOURCE: NORMAL
WBC # BLD AUTO: 7.9 K/UL (ref 4.8–10.8)

## 2024-10-31 PROCEDURE — 83735 ASSAY OF MAGNESIUM: CPT

## 2024-10-31 PROCEDURE — 93306 TTE W/DOPPLER COMPLETE: CPT | Mod: 26 | Performed by: INTERNAL MEDICINE

## 2024-10-31 PROCEDURE — A9270 NON-COVERED ITEM OR SERVICE: HCPCS | Performed by: HOSPITALIST

## 2024-10-31 PROCEDURE — 36415 COLL VENOUS BLD VENIPUNCTURE: CPT

## 2024-10-31 PROCEDURE — 97166 OT EVAL MOD COMPLEX 45 MIN: CPT

## 2024-10-31 PROCEDURE — 700111 HCHG RX REV CODE 636 W/ 250 OVERRIDE (IP): Mod: JZ | Performed by: HOSPITALIST

## 2024-10-31 PROCEDURE — 94760 N-INVAS EAR/PLS OXIMETRY 1: CPT

## 2024-10-31 PROCEDURE — 93306 TTE W/DOPPLER COMPLETE: CPT

## 2024-10-31 PROCEDURE — 84100 ASSAY OF PHOSPHORUS: CPT

## 2024-10-31 PROCEDURE — 97535 SELF CARE MNGMENT TRAINING: CPT

## 2024-10-31 PROCEDURE — 80053 COMPREHEN METABOLIC PANEL: CPT

## 2024-10-31 PROCEDURE — 99233 SBSQ HOSP IP/OBS HIGH 50: CPT | Performed by: INTERNAL MEDICINE

## 2024-10-31 PROCEDURE — 700105 HCHG RX REV CODE 258: Performed by: HOSPITALIST

## 2024-10-31 PROCEDURE — 94660 CPAP INITIATION&MGMT: CPT

## 2024-10-31 PROCEDURE — 770020 HCHG ROOM/CARE - TELE (206)

## 2024-10-31 PROCEDURE — 85025 COMPLETE CBC W/AUTO DIFF WBC: CPT

## 2024-10-31 PROCEDURE — 84145 PROCALCITONIN (PCT): CPT

## 2024-10-31 PROCEDURE — 700102 HCHG RX REV CODE 250 W/ 637 OVERRIDE(OP): Performed by: HOSPITALIST

## 2024-10-31 PROCEDURE — 97162 PT EVAL MOD COMPLEX 30 MIN: CPT

## 2024-10-31 RX ADMIN — AMPICILLIN AND SULBACTAM 3 G: 1; 2 INJECTION, POWDER, FOR SOLUTION INTRAMUSCULAR; INTRAVENOUS at 05:21

## 2024-10-31 RX ADMIN — AZITHROMYCIN DIHYDRATE 500 MG: 250 TABLET ORAL at 18:03

## 2024-10-31 RX ADMIN — AMPICILLIN AND SULBACTAM 3 G: 1; 2 INJECTION, POWDER, FOR SOLUTION INTRAMUSCULAR; INTRAVENOUS at 12:34

## 2024-10-31 RX ADMIN — ARIPIPRAZOLE 10 MG: 10 TABLET ORAL at 05:21

## 2024-10-31 RX ADMIN — ASPIRIN 81 MG 81 MG: 81 TABLET ORAL at 05:21

## 2024-10-31 RX ADMIN — TAMSULOSIN HYDROCHLORIDE 0.4 MG: 0.4 CAPSULE ORAL at 09:26

## 2024-10-31 RX ADMIN — AMPICILLIN AND SULBACTAM 3 G: 1; 2 INJECTION, POWDER, FOR SOLUTION INTRAMUSCULAR; INTRAVENOUS at 18:03

## 2024-10-31 RX ADMIN — LATANOPROST 1 DROP: 50 SOLUTION OPHTHALMIC at 20:54

## 2024-10-31 RX ADMIN — ENOXAPARIN SODIUM 40 MG: 100 INJECTION SUBCUTANEOUS at 18:03

## 2024-10-31 RX ADMIN — Medication 500 MG: at 18:03

## 2024-10-31 RX ADMIN — AMPICILLIN AND SULBACTAM 3 G: 1; 2 INJECTION, POWDER, FOR SOLUTION INTRAMUSCULAR; INTRAVENOUS at 23:22

## 2024-10-31 RX ADMIN — DORZOLAMIDE HYDROCHLORIDE AND TIMOLOL MALEATE 1 DROP: 20; 5 SOLUTION/ DROPS OPHTHALMIC at 18:02

## 2024-10-31 RX ADMIN — DORZOLAMIDE HYDROCHLORIDE AND TIMOLOL MALEATE 1 DROP: 20; 5 SOLUTION/ DROPS OPHTHALMIC at 05:21

## 2024-10-31 ASSESSMENT — GAIT ASSESSMENTS
DISTANCE (FEET): 10
GAIT LEVEL OF ASSIST: MINIMAL ASSIST
ASSISTIVE DEVICE: HAND HELD ASSIST
DISTANCE (FEET): 10
DEVIATION: SHUFFLED GAIT;BRADYKINETIC

## 2024-10-31 ASSESSMENT — COGNITIVE AND FUNCTIONAL STATUS - GENERAL
TOILETING: A LOT
WALKING IN HOSPITAL ROOM: A LITTLE
SUGGESTED CMS G CODE MODIFIER MOBILITY: CK
CLIMB 3 TO 5 STEPS WITH RAILING: A LOT
MOVING TO AND FROM BED TO CHAIR: A LITTLE
DAILY ACTIVITIY SCORE: 13
TURNING FROM BACK TO SIDE WHILE IN FLAT BAD: A LOT
MOBILITY SCORE: 15
MOVING FROM LYING ON BACK TO SITTING ON SIDE OF FLAT BED: A LOT
DRESSING REGULAR UPPER BODY CLOTHING: A LOT
STANDING UP FROM CHAIR USING ARMS: A LITTLE
DRESSING REGULAR LOWER BODY CLOTHING: A LOT
PERSONAL GROOMING: A LOT
SUGGESTED CMS G CODE MODIFIER DAILY ACTIVITY: CL
EATING MEALS: A LITTLE
HELP NEEDED FOR BATHING: A LOT

## 2024-10-31 ASSESSMENT — ENCOUNTER SYMPTOMS
COUGH: 1
WEAKNESS: 1
SHORTNESS OF BREATH: 1

## 2024-10-31 ASSESSMENT — PAIN DESCRIPTION - PAIN TYPE: TYPE: ACUTE PAIN

## 2024-10-31 ASSESSMENT — FIBROSIS 4 INDEX: FIB4 SCORE: 4.9

## 2024-10-31 ASSESSMENT — ACTIVITIES OF DAILY LIVING (ADL): TOILETING: REQUIRES ASSIST

## 2024-10-31 NOTE — DOCUMENTATION QUERY
"                                                                         [facility]                                                                       Query Response Note      PATIENT:               DANELLE CLEVELAND  ACCT #:                  [id number]  MRN:                     [id number]  :                      1950  ADMIT DATE:       10/29/2024 6:46 PM  DISCH DATE:          RESPONDING  PROVIDER #:        [id number]           QUERY TEXT:    \"Hypoxemic respiratory failure\" is documented in the Medical Record. Please specify the type and acuity.    Note: If you agree with a diagnosis listed above, please remember to include it in your concurrent daily documentation and onto the Discharge Summary.    The patient's Clinical Indicators include:  (H&P) \"brought to the ED today on 10/29/2024 for evaluation of worsening shortness of breath, hypoxia, cough and fever. He was hypoxic to 85% on arrival and currently requiring 2 L of oxygen via nasal cannula.\"    (PN 10/30) \"He is being admitted for hypoxemic respiratory failure secondary to pneumonia requiring intravenous antibiotics\"    Vital Signs: 10/29@1854: RR: 44, SpO2: 93% 2LNC (P/F Ratio 242.8)                        10/29@2103: RR: 35, SpO2: 92% 2LNC (P/F Ratio 228.6)                        10/30@0540: RR: 33, SpO2: 90% 2LNC (P/F Ratio 207.1)                        10/30@1357: RR: 20, SpO2: 93% 1LNC (P/F Ratio 283.3)     Risk Factors: Multifocal pneumonia, Acute encephalopathy, Elevated brain natriuretic peptide (BNP) level, demand ischemia, Chronic myelocytic leukemia, SALENA, chronic nocturnal hypoxemic respiratory failure    Tx: Oxygen as needed, Respiratory protocol, Bronchodilators, Incentive spirometry, RT consult    If you have any questions, please contact:  Alexander Jarrett RN CDI [facility]  [email or web address]  Alexander Jarrett [address] [employer]  Options provided:   -- Acute respiratory failure with hypoxia   -- Acute on chronic respiratory failure with hypoxia   " -- Other explanation, Please specify      Query created by: Alexander Oliveira on 10/31/2024 5:31 AM    RESPONSE TEXT:    Acute respiratory failure with hypoxia          Electronically signed by:  DIA CHONG MD 10/31/2024 4:14 PM

## 2024-10-31 NOTE — CARE PLAN
The patient is Watcher - Medium risk of patient condition declining or worsening    Shift Goals  Clinical Goals: IVF, ABx, Wean O2  Patient Goals: Comfort    Progress made toward(s) clinical / shift goals:    Problem: Knowledge Deficit - Standard  Goal: Patient and family/care givers will demonstrate understanding of plan of care, disease process/condition, diagnostic tests and medications  Outcome: Progressing     Problem: Hemodynamics  Goal: Patient's hemodynamics, fluid balance and neurologic status will be stable or improve  Outcome: Progressing     Problem: Fluid Volume  Goal: Fluid volume balance will be maintained  Outcome: Progressing       Patient is not progressing towards the following goals:

## 2024-10-31 NOTE — CARE PLAN
The patient is Stable - Low risk of patient condition declining or worsening    Shift Goals  Clinical Goals: IVF, ABX, PT/OT eval  Patient Goals: Comfort    Progress made toward(s) clinical / shift goals: Pt aox4, mildly more alert than yesterday. Pt calling appropriately for needs. No c/o pain or discomfort. IV ABX run per orders.     Patient is not progressing towards the following goals:      Problem: Mobility  Goal: Patient's capacity to carry out activities will improve  Outcome: Progressing  Pt up with PT ambulating in room. Pt encouraged to get up to chairs for all meals.     Problem: Bowel Elimination  Goal: Establish and maintain regular bowel function  Outcome: Progressing  Pt had multiple loose incontinent BMs this shift. Pt calls appropriately after episodes.

## 2024-10-31 NOTE — PROGRESS NOTES
Telemetry Shift Summary     Rhythm: SR  Rate: 70s-80s  Measurements: 0.16/0.08/0.36  Ectopy (reported by Monitor Tech): rare PVC     Normal Values  Rhythm: Sinus  HR:   Measurements: 0.12-0.20/0.06-0.10/0.30-0.52

## 2024-10-31 NOTE — THERAPY
Physical Therapy   Initial Evaluation     Patient Name: Austin Woodard  Age:  74 y.o., Sex:  male  Medical Record #: [id number]  Today's Date: 10/31/2024     Precautions  Precautions: Fall Risk  Comments: legally blind    Assessment  Patient is 74 y.o. male with a diagnosis of PNA, encephalopathy. Past medical history of chronic hypoxemic nocturnal respiratory failure, primary hypertension, bipolar disorder/depression chronic lymphocytic leukemia.  Pt is not quite at his baseline, is presenting with weakness and impaired balance, only tolerating ambulating short distances in room 10ft x2, requiring Chirag/HHA, on 2L nc with SOB noted with gait. Recommend continued acute PT to progress mobility with f/u HHPT at .     Plan    Physical Therapy Initial Treatment Plan   Treatment Plan : Bed Mobility, Gait Training, Family / Caregiver Training, Neuro Re-Education / Balance, Therapeutic Exercise, Therapeutic Activities, Self Care / Home Evaluation  Treatment Frequency: 3 Times per Week  Duration: Until Therapy Goals Met    DC Equipment Recommendations: None  Discharge Recommendations: Recommend home health for continued physical therapy services        10/31/24 1217   Prior Living Situation   Housing / Facility Group Home   Equipment Owned Front-Wheel Walker   Lives with - Patient's Self Care Capacity Attendant / Paid Care Giver   Prior Level of Functional Mobility   Comments Pt is able to ambulate at baseline without an AD but due to being legally blind requires assist x1 for guidance   Cognition    Level of Consciousness Alert   Comments Pt is soft spoken but answers questions appropriately and follows simple commands   Active ROM Lower Body    Active ROM Lower Body  WDL   Strength Lower Body   Lower Body Strength  X   Gross Strength Generalized Weakness, Equal Bilaterally   Balance Assessment   Sitting Balance (Static) Fair   Sitting Balance (Dynamic) Fair   Standing Balance (Static) Fair -   Standing Balance (Dynamic)  Poor +   Weight Shift Sitting Fair   Weight Shift Standing Fair   Comments stdg with HHA   Bed Mobility    Supine to Sit Moderate Assist   Sit to Supine Moderate Assist   Gait Analysis   Gait Level Of Assist Minimal Assist   Assistive Device Hand Held Assist   Distance (Feet) 10   # of Times Distance was Traveled 2   Deviation Shuffled Gait;Bradykinetic   Functional Mobility   Sit to Stand Minimal Assist   Bed, Chair, Wheelchair Transfer Minimal Assist   Activity Tolerance   Standing 3-4 min   Short Term Goals    Short Term Goal # 1 Pt will be able to perform bed mobility and sup <> sit Maykel in 6 visits.   Short Term Goal # 2 Pt will be able to perform sit <>stand and transfer SBA in 6 visits.   Short Term Goal # 3 Pt will be able to ambulate 100 ft with FWW vs HHA Chirag in 6 visits.

## 2024-10-31 NOTE — PROGRESS NOTES
Hospital Medicine Daily Progress Note    Date of Service  10/31/2024    Chief Complaint  Austin Woodard is a 74 y.o. male admitted 10/29/2024 with fever, shortness of breath    Hospital Course  No notes on file    Interval Problem Update  Patient appearing lethargic.  He is legally blind.  He is currently living in a group home.  I reviewed labs, hemoglobin 12.5, WBC 7.9, platelets 144.  Sodium 139, potassium 4.2, creatinine 1.1, magnesium 2.1, Phos 2.4.  I ordered a procalcitonin level  Continue Unasyn and azithromycin  Patient lives at group home, we will need PT OT evaluation prior  Echocardiogram pending  Negative for COVID/flu/influenza on admission    I have discussed this patient's plan of care and discharge plan at IDT rounds today with Case Management, Nursing, Nursing leadership, and other members of the IDT team.    Consultants/Specialty  None    Code Status  Full Code    Disposition  The patient is not medically cleared for discharge to home or a post-acute facility.  Anticipate discharge to: Back to group home    I have placed the appropriate orders for post-discharge needs.    Review of Systems  Review of Systems   Constitutional:  Positive for malaise/fatigue.   Eyes:         Chronically blind   Respiratory:  Positive for cough and shortness of breath.    Neurological:  Positive for weakness.   All other systems reviewed and are negative.       Physical Exam  Temp:  [36.6 °C (97.9 °F)-37.8 °C (100 °F)] 36.8 °C (98.3 °F)  Pulse:  [74-85] 74  Resp:  [16-22] 18  BP: (112-144)/(63-81) 144/81  SpO2:  [90 %-94 %] 93 %    Physical Exam  Vitals and nursing note reviewed.   Constitutional:       General: He is not in acute distress.     Appearance: He is ill-appearing and toxic-appearing. He is not diaphoretic.   HENT:      Mouth/Throat:      Mouth: Mucous membranes are dry.      Pharynx: No oropharyngeal exudate.   Eyes:      Comments: Bilaterally blind    Cardiovascular:      Rate and Rhythm: Normal rate  and regular rhythm.      Pulses: Normal pulses.      Heart sounds: Normal heart sounds. No murmur heard.  Pulmonary:      Effort: Pulmonary effort is normal. No respiratory distress.      Breath sounds: Rhonchi and rales present. No wheezing.   Abdominal:      General: Bowel sounds are normal. There is no distension.      Tenderness: There is no abdominal tenderness.   Musculoskeletal:         General: No swelling or tenderness. Normal range of motion.      Right lower leg: No edema.      Left lower leg: No edema.   Skin:     General: Skin is warm.      Capillary Refill: Capillary refill takes less than 2 seconds.      Coloration: Skin is not jaundiced or pale.   Neurological:      Mental Status: He is alert and oriented to person, place, and time. Mental status is at baseline.      Motor: Weakness present.   Psychiatric:         Mood and Affect: Mood normal.         Behavior: Behavior normal.         Thought Content: Thought content normal.         Judgment: Judgment normal.         Fluids    Intake/Output Summary (Last 24 hours) at 10/31/2024 1552  Last data filed at 10/31/2024 1230  Gross per 24 hour   Intake 400 ml   Output 600 ml   Net -200 ml        Laboratory  Recent Labs     10/29/24  1916 10/30/24  0300 10/31/24  0025   WBC 11.9* 8.7 7.9   RBC 4.26* 4.28* 4.21*   HEMOGLOBIN 12.5* 12.6* 12.5*   HEMATOCRIT 37.6* 39.3* 37.8*   MCV 88.3 91.8 89.8   MCH 29.3 29.4 29.7   MCHC 33.2 32.1* 33.1   RDW 54.4* 57.0* 55.9*   PLATELETCT 130* 115* 144*   MPV 8.9* 9.0 9.2     Recent Labs     10/29/24  1916 10/30/24  0300 10/31/24  0025   SODIUM 137 141 139   POTASSIUM 4.1 4.0 4.2   CHLORIDE 103 107 105   CO2 24 24 21   GLUCOSE 139* 88 90   BUN 13 11 16   CREATININE 1.22 1.07 1.10   CALCIUM 8.6 8.1* 8.8     Recent Labs     10/29/24  1916   INR 1.25*               Imaging  EC-ECHOCARDIOGRAM COMPLETE W/O CONT   Final Result      DX-CHEST-PORTABLE (1 VIEW)   Final Result      1.  Chronic blunting of the left costophrenic angle,  pleural thickening and/or small effusion.   2.  Probable small right pleural effusion.   3.  Bibasilar atelectasis and/or edema. Correlate clinically for infection.           Assessment/Plan  * Multifocal pneumonia- (present on admission)  Assessment & Plan  Patient had low-grade fever 100.9 F on 10/29  Continuing Unasyn and azithromycin  I am checking procalcitonin  I reviewed chest x-ray, showed left pleural effusion and possible consolidation hidden  I am checking D-dimer in the morning, may need a CTA or CT chest with contrast    Discharge planning issues- (present on admission)  Assessment & Plan  Pt lives at [facility]  Will need PT/OT evaluation    Elevated brain natriuretic peptide (BNP) level- (present on admission)  Assessment & Plan  Patient BNP is elevated. pending echocardiogram      ACP (advance care planning)- (present on admission)  Assessment & Plan  Patient is a full code as per daughter    Acute encephalopathy- (present on admission)  Assessment & Plan  The patient is lethargic but easily arousable. Alert and oriented x 3-4 when awake  Likely metabolic likely secondary to hypoxia and pneumonia  Anticipate improvement with correcting/treating pneumonia and hypoxia  Continue treatment for pneumonia and hypoxia    Elevated troponin secondary to demand ischemia (HCC)- (present on admission)  Assessment & Plan  -Initial troponin is 20 ng/L.  This is likely demand ischemia from above but I will repeat troponin test in the morning and monitor him on telemetry.-Added EKG    10/30/2024   Troponin trend 27, 28 and 29.  No chest pain.    This is likely secondary to demand ischemia secondary to hypoxia, and secondary to reduced renal clearance secondary to CKD     Monitoring on telemetry    Normocytic anemia- (present on admission)  Assessment & Plan  Hemoglobin 12.5, platelets 144  Monitoring    Hypoxia- (present on admission)  Assessment & Plan  -Likely exacerbated by underlying pneumonia.  He is  currently on 2 L of oxygen via nasal cannula to maintain O2 saturations above 90%.  Per ERP this is not his baseline.  Patient was started on IV antibiotics.  Plan as above.  RT to consult and monitor as well, provide nebulization therapy hoping he will improve oxygen demand in the morning.    10/30/2024   Saturating well on 1-2 L of oxygen [uses oxygen only at night]    Continue to treat pneumonia with intravenous antibiotics.  Oxygen as needed, Respiratory protocol, Bronchodilators, Incentive spirometry   Patient BNP is elevated.  I will check an echocardiography to rule out heart failure    SALENA (obstructive sleep apnea)- (present on admission)  Assessment & Plan  -I ordered nocturnal CPAP    CPAP (continuous positive airway pressure) dependence- (present on admission)  Assessment & Plan  - Added nocturnal CPAP.  Patient currently on 2 L of supplemental oxygen via nasal cannula.    Thrombocytopenia (HCC)- (present on admission)  Assessment & Plan  -Platelets are 130.  Will monitor CBC in the morning.    10/30/2024   Platelet count is 115, down from 130.  No evidence of gross bleeding.  No evidence of gross bleeding.  Hemoglobin is stable 12.6     Bipolar 1 disorder (HCC)- (present on admission)  Assessment & Plan  Resume home aripiprazole      Chronic myelocytic leukemia (HCC)- (present on admission)  Assessment & Plan  WBC on admission is 11.9.  Patient is on dasatinib.  Follows with Dr. Valdez  Dasatinib use increase the risk of infections by 9% - 14%, also associated with bone marrow suppression, anemia and thrombocytopenia     Hold home Dasatinib, given current infection with pneumonia      Benign prostatic hyperplasia with lower urinary tract symptoms- (present on admission)  Assessment & Plan  Continue tamsulosin    Primary open angle glaucoma- (present on admission)  Assessment & Plan  Continue home glaucoma medications         VTE prophylaxis:    enoxaparin ppx      I have performed a physical exam and  reviewed and updated ROS and Plan today (10/31/2024). In review of yesterday's note (10/30/2024), there are no changes except as documented above.      Total time spent 51 minutes. I spent greater than 50% of the time for patient care, including unit/floor time, multiple face-to-face encounters with the patient, counseling on treatment plan and discussion with bedside RN.  Further, I spent time on my own review of patient's overnight events, RN notes, imaging and lab analysis, and developing my assessment and plan above.  In addition, working with , social workers, and Charge RN on case coordination on this date.    This note was generated using voice recognition software which has a chance of producing errors of grammar and content.  I have made every reasonable attempt to find and correct any errors, but it should be expected that some may not be found prior to finalization of this note.

## 2024-11-01 ENCOUNTER — APPOINTMENT (OUTPATIENT)
Dept: RADIOLOGY | Facility: MEDICAL CENTER | Age: 74
DRG: 193 | End: 2024-11-01
Attending: INTERNAL MEDICINE
Payer: COMMERCIAL

## 2024-11-01 LAB
ALBUMIN SERPL BCP-MCNC: 3.2 G/DL (ref 3.2–4.9)
B PARAP IS1001 DNA NPH QL NAA+NON-PROBE: NOT DETECTED
B PERT.PT PRMT NPH QL NAA+NON-PROBE: NOT DETECTED
BACTERIA UR CULT: NORMAL
BUN SERPL-MCNC: 18 MG/DL (ref 8–22)
C PNEUM DNA NPH QL NAA+NON-PROBE: NOT DETECTED
CALCIUM ALBUM COR SERPL-MCNC: 9 MG/DL (ref 8.5–10.5)
CALCIUM SERPL-MCNC: 8.4 MG/DL (ref 8.4–10.2)
CHLORIDE SERPL-SCNC: 104 MMOL/L (ref 96–112)
CO2 SERPL-SCNC: 24 MMOL/L (ref 20–33)
CREAT SERPL-MCNC: 1.08 MG/DL (ref 0.5–1.4)
D DIMER PPP IA.FEU-MCNC: 2.84 UG/ML (FEU) (ref 0–0.5)
ERYTHROCYTE [DISTWIDTH] IN BLOOD BY AUTOMATED COUNT: 57.8 FL (ref 35.9–50)
FLUAV RNA NPH QL NAA+NON-PROBE: NOT DETECTED
FLUBV RNA NPH QL NAA+NON-PROBE: NOT DETECTED
GFR SERPLBLD CREATININE-BSD FMLA CKD-EPI: 72 ML/MIN/1.73 M 2
GLUCOSE SERPL-MCNC: 86 MG/DL (ref 65–99)
HADV DNA NPH QL NAA+NON-PROBE: NOT DETECTED
HCOV 229E RNA NPH QL NAA+NON-PROBE: NOT DETECTED
HCOV HKU1 RNA NPH QL NAA+NON-PROBE: NOT DETECTED
HCOV NL63 RNA NPH QL NAA+NON-PROBE: NOT DETECTED
HCOV OC43 RNA NPH QL NAA+NON-PROBE: NOT DETECTED
HCT VFR BLD AUTO: 36.6 % (ref 42–52)
HGB BLD-MCNC: 11.8 G/DL (ref 14–18)
HMPV RNA NPH QL NAA+NON-PROBE: NOT DETECTED
HPIV1 RNA NPH QL NAA+NON-PROBE: NOT DETECTED
HPIV2 RNA NPH QL NAA+NON-PROBE: NOT DETECTED
HPIV3 RNA NPH QL NAA+NON-PROBE: NOT DETECTED
HPIV4 RNA NPH QL NAA+NON-PROBE: NOT DETECTED
M PNEUMO DNA NPH QL NAA+NON-PROBE: NOT DETECTED
MAGNESIUM SERPL-MCNC: 2.2 MG/DL (ref 1.5–2.5)
MCH RBC QN AUTO: 29.5 PG (ref 27–33)
MCHC RBC AUTO-ENTMCNC: 32.2 G/DL (ref 32.3–36.5)
MCV RBC AUTO: 91.5 FL (ref 81.4–97.8)
PHOSPHATE SERPL-MCNC: 3.1 MG/DL (ref 2.5–4.5)
PLATELET # BLD AUTO: 123 K/UL (ref 164–446)
PMV BLD AUTO: 8.9 FL (ref 9–12.9)
POTASSIUM SERPL-SCNC: 3.9 MMOL/L (ref 3.6–5.5)
RBC # BLD AUTO: 4 M/UL (ref 4.7–6.1)
RSV RNA NPH QL NAA+NON-PROBE: NOT DETECTED
RV+EV RNA NPH QL NAA+NON-PROBE: DETECTED
SARS-COV-2 RNA NPH QL NAA+NON-PROBE: NOTDETECTED
SIGNIFICANT IND 70042: NORMAL
SITE SITE: NORMAL
SODIUM SERPL-SCNC: 138 MMOL/L (ref 135–145)
SOURCE SOURCE: NORMAL
WBC # BLD AUTO: 6.1 K/UL (ref 4.8–10.8)

## 2024-11-01 PROCEDURE — 770020 HCHG ROOM/CARE - TELE (206)

## 2024-11-01 PROCEDURE — 36415 COLL VENOUS BLD VENIPUNCTURE: CPT

## 2024-11-01 PROCEDURE — 700105 HCHG RX REV CODE 258: Performed by: HOSPITALIST

## 2024-11-01 PROCEDURE — 0202U NFCT DS 22 TRGT SARS-COV-2: CPT

## 2024-11-01 PROCEDURE — A9270 NON-COVERED ITEM OR SERVICE: HCPCS | Performed by: HOSPITALIST

## 2024-11-01 PROCEDURE — 700111 HCHG RX REV CODE 636 W/ 250 OVERRIDE (IP): Mod: JZ | Performed by: HOSPITALIST

## 2024-11-01 PROCEDURE — 99233 SBSQ HOSP IP/OBS HIGH 50: CPT | Performed by: INTERNAL MEDICINE

## 2024-11-01 PROCEDURE — 94660 CPAP INITIATION&MGMT: CPT

## 2024-11-01 PROCEDURE — 80069 RENAL FUNCTION PANEL: CPT

## 2024-11-01 PROCEDURE — 8E0ZXY6 ISOLATION: ICD-10-PCS | Performed by: INTERNAL MEDICINE

## 2024-11-01 PROCEDURE — 85027 COMPLETE CBC AUTOMATED: CPT

## 2024-11-01 PROCEDURE — 700102 HCHG RX REV CODE 250 W/ 637 OVERRIDE(OP): Performed by: HOSPITALIST

## 2024-11-01 PROCEDURE — 94760 N-INVAS EAR/PLS OXIMETRY 1: CPT

## 2024-11-01 PROCEDURE — 700117 HCHG RX CONTRAST REV CODE 255: Performed by: INTERNAL MEDICINE

## 2024-11-01 PROCEDURE — 71275 CT ANGIOGRAPHY CHEST: CPT

## 2024-11-01 PROCEDURE — 85379 FIBRIN DEGRADATION QUANT: CPT

## 2024-11-01 PROCEDURE — 700111 HCHG RX REV CODE 636 W/ 250 OVERRIDE (IP): Performed by: INTERNAL MEDICINE

## 2024-11-01 PROCEDURE — 83735 ASSAY OF MAGNESIUM: CPT

## 2024-11-01 RX ORDER — FUROSEMIDE 10 MG/ML
40 INJECTION INTRAMUSCULAR; INTRAVENOUS ONCE
Status: COMPLETED | OUTPATIENT
Start: 2024-11-01 | End: 2024-11-01

## 2024-11-01 RX ADMIN — FUROSEMIDE 40 MG: 10 INJECTION, SOLUTION INTRAMUSCULAR; INTRAVENOUS at 12:20

## 2024-11-01 RX ADMIN — Medication 500 MG: at 18:16

## 2024-11-01 RX ADMIN — DORZOLAMIDE HYDROCHLORIDE AND TIMOLOL MALEATE 1 DROP: 20; 5 SOLUTION/ DROPS OPHTHALMIC at 05:18

## 2024-11-01 RX ADMIN — AMPICILLIN AND SULBACTAM 3 G: 1; 2 INJECTION, POWDER, FOR SOLUTION INTRAMUSCULAR; INTRAVENOUS at 23:29

## 2024-11-01 RX ADMIN — TAMSULOSIN HYDROCHLORIDE 0.4 MG: 0.4 CAPSULE ORAL at 08:30

## 2024-11-01 RX ADMIN — LATANOPROST 1 DROP: 50 SOLUTION OPHTHALMIC at 20:42

## 2024-11-01 RX ADMIN — AMPICILLIN AND SULBACTAM 3 G: 1; 2 INJECTION, POWDER, FOR SOLUTION INTRAMUSCULAR; INTRAVENOUS at 18:17

## 2024-11-01 RX ADMIN — AMPICILLIN AND SULBACTAM 3 G: 1; 2 INJECTION, POWDER, FOR SOLUTION INTRAMUSCULAR; INTRAVENOUS at 05:18

## 2024-11-01 RX ADMIN — ASPIRIN 81 MG 81 MG: 81 TABLET ORAL at 05:18

## 2024-11-01 RX ADMIN — ENOXAPARIN SODIUM 40 MG: 100 INJECTION SUBCUTANEOUS at 18:16

## 2024-11-01 RX ADMIN — IOHEXOL 60 ML: 350 INJECTION, SOLUTION INTRAVENOUS at 11:16

## 2024-11-01 RX ADMIN — AMPICILLIN AND SULBACTAM 3 G: 1; 2 INJECTION, POWDER, FOR SOLUTION INTRAMUSCULAR; INTRAVENOUS at 12:08

## 2024-11-01 RX ADMIN — DORZOLAMIDE HYDROCHLORIDE AND TIMOLOL MALEATE 1 DROP: 20; 5 SOLUTION/ DROPS OPHTHALMIC at 18:23

## 2024-11-01 RX ADMIN — ARIPIPRAZOLE 10 MG: 10 TABLET ORAL at 05:18

## 2024-11-01 ASSESSMENT — ENCOUNTER SYMPTOMS
COUGH: 1
WEAKNESS: 1
SHORTNESS OF BREATH: 1

## 2024-11-01 ASSESSMENT — FIBROSIS 4 INDEX: FIB4 SCORE: 5.74

## 2024-11-01 ASSESSMENT — PAIN DESCRIPTION - PAIN TYPE: TYPE: ACUTE PAIN

## 2024-11-01 NOTE — THERAPY
"Occupational Therapy   Initial Evaluation     Patient Name: Austin Woodard  Age:  74 y.o., Sex:  male  Medical Record #: [id number]  Today's Date: 10/31/2024     Precautions  Precautions: Fall Risk  Comments: legally blind    Assessment  Patient is 74 y.o. 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  Pt with Acute encephalopathy, hypoxia, Benign prostatic hyperplasia with lower urinary tract symptoms.  Pt also legally blind from macular degeneration.  Pt able to get up from bed and walk a short distance with Chirag and HHA.  Limited by SOB, impaired activity tolerance.  He has great support from caregivers at his group home, so once he is medically clear, appears appropriate for return to  with HH therapy.  OT will follow while in house.      Plan    Occupational Therapy Initial Treatment Plan   Treatment Interventions: Self Care / Activities of Daily Living, Adaptive Equipment, Neuro Re-Education / Balance, Therapeutic Exercises, Therapeutic Activity, Family / Caregiver Training  Treatment Frequency: 3 Times per Week  Duration: Until Therapy Goals Met    DC Equipment Recommendations: None  Discharge Recommendations: Recommend home health for continued occupational therapy services     Subjective    \"I can't see anything on my plate.\"     Objective       10/31/24 1231   Prior Living Situation   Prior Services Continuous (24 Hour) Care Giving Per Service   Housing / Facility Group Home  (pt lives in a 2 client group home with close supervision and assist from both owners and 2 staff people)   Steps Into Home 0   Steps In Home 0   Bathroom Set up Walk In Shower;Grab Bars   Equipment Owned 4-Wheel Walker;Tub / Shower Seat;Grab Bar(s) In Tub / Shower;Grab Bar(s) By Toilet   Lives with - Patient's Self " "Care Capacity Attendant / Paid Care Giver   Comments Met with group home owner.cargiver for Austin, named Steve.  He reports Austin nets help with dressing, bathing, IADL's, and set up for meals (occ assist if it's something difficult or messy to eat).  Steve reports pt had a  that was coming 1x/wk to do exercises with Austin amd Steve takes him with him on errands around town to get him walking as much as possible.   Prior Level of ADL Function   Self Feeding Requires Assist  (set-up, sometimes help with feeding dep on the meal)   Grooming / Hygiene Requires Assist  (SBA with setup)   Bathing Requires Assist  (stands in shower with grab bar, CG helps bathe him)   Dressing Requires Assist   Toileting Requires Assist  (wears briefs, able to tell CG when he needs to use BR)   Prior Level of IADL Function   Medication Management Requires Assist   Laundry Requires Assist   Kitchen Mobility Requires Assist   Finances Requires Assist   Home Management Requires Assist   Shopping Requires Assist   Prior Level Of Mobility Independent Without Device in Home;Supervision Without Device in Community  ((hand held assist in community d/t vision loss))   Driving / Transportation Relatives / Others Provide Transportation   Occupation (Pre-Hospital Vocational) Retired Due To Age   Leisure Interests Television;Sports   Precautions   Precautions Fall Risk   Comments legally blind   Vitals   O2 (LPM) 2   O2 Delivery Device Silicone Nasal Cannula   Pain 0 - 10 Group   Therapist Pain Assessment 0;During Activity;Nurse Notified   Cognition    Level of Consciousness Alert   Comments soft spoken, able to follow 1-2 step simple commands and answer questions appropriately   Active ROM Upper Body   Active ROM Upper Body  X   Dominant Hand Right   Comments B shoulders with limited flexion 2/2 \"stiffness\" per pt   Strength Upper Body   Upper Body Strength  X   Gross Strength Generalized Weakness, Equal Bilaterally.    Upper Body Muscle Tone   Upper Body " Muscle Tone  WDL   Coordination Upper Body   Coordination X   Comments Impa FM and GM in part due to lack of ability to see the objects in his environment   Balance Assessment   Sitting Balance (Static) Fair   Sitting Balance (Dynamic) Fair   Standing Balance (Static) Fair -   Standing Balance (Dynamic) Poor +   Weight Shift Sitting Fair   Weight Shift Standing Fair   Comments with HHA in standing   Bed Mobility    Supine to Sit Moderate Assist   Sit to Supine Moderate Assist   ADL Assessment   Eating Minimal Assist  (OT assisted with initiation of meal, until CNA able to come in and assist with finishing it)   Upper Body Dressing Maximal Assist   Lower Body Dressing Maximal Assist   Toileting Maximal Assist  (incont of stool in bed prior to therapy)   How much help from another person does the patient currently need...   Putting on and taking off regular lower body clothing? 2   Bathing (including washing, rinsing, and drying)? 2   Toileting, which includes using a toilet, bedpan, or urinal? 2   Putting on and taking off regular upper body clothing? 2   Taking care of personal grooming such as brushing teeth? 2   Eating meals? 3   6 Clicks Daily Activity Score 13   Functional Mobility   Sit to Stand Minimal Assist   Bed, Chair, Wheelchair Transfer Minimal Assist   Transfer Method Stand Step   Mobility Chirag B HHA   Distance (Feet) 10   # of Times Distance was Traveled 2   Comments Amb to sink and back in room, stopped at sink for a minute   Visual Perception   Visual Perception  X   Visual Acuity Blind Bilaterally   Edema / Skin Assessment   Edema / Skin  Not Assessed   Activity Tolerance   Sitting Edge of Bed 2 min x2   Standing 3-4 min   Patient / Family Goals   Patient / Family Goal #1 home with GH with caregivers   Short Term Goals   Short Term Goal # 1 Pt will dress with Chirag in 3 visits   Short Term Goal # 2 pt will groom with CGA and setup standing 8 min at sink in 3 visits   Short Term Goal # 3 Pt will  toilet in BR with CGA in 4 vistis   Education Group   Education Provided Role of Occupational Therapist;Activities of Daily Living   Role of Occupational Therapist Patient Response Patient;Acceptance;Explanation;Verbal Demonstration   ADL Patient Response Patient;Acceptance;Explanation;Verbal Demonstration

## 2024-11-01 NOTE — DISCHARGE PLANNING
Per CM SW, DPA placed call to local rep Anita Harish at [phone] to follow up on HH order. LVM. Called main office line and they stated they had received referral and would need to check to see if Pt has out of network benefits.

## 2024-11-01 NOTE — CARE PLAN
The patient is Stable - Low risk of patient condition declining or worsening    Shift Goals  Clinical Goals: IVF/IV ABX, PT/OT  Patient Goals: Comfort, sleep  Family Goals: Updates    Progress made toward(s) clinical / shift goals:    Problem: Knowledge Deficit - Standard  Goal: Patient and family/care givers will demonstrate understanding of plan of care, disease process/condition, diagnostic tests and medications  Description: Target End Date:  1-3 days or as soon as patient condition allows    Document in Patient Education    1.  Patient and family/caregiver oriented to unit, equipment, visitation policy and means for communicating concern  2.  Complete/review Learning Assessment  3.  Assess knowledge level of disease process/condition, treatment plan, diagnostic tests and medications  4.  Explain disease process/condition, treatment plan, diagnostic tests and medications  Outcome: Progressing     Problem: Urinary - Renal Perfusion  Goal: Ability to achieve and maintain adequate renal perfusion and functioning will improve  Description: Target End Date:  Prior to discharge or change in level of care    Document on I/O and Assessment flowsheet    1.  Urine output will remain greater than 0.5ml/Kg/HR  2.  Monitor amount and/or characteristics of urine per order/policy. Specific gravity per order/policy  3.  Assess signs and symptoms of renal dysfunction  Outcome: Progressing  Note: Use of condom cath. Good urine output this shift.     Problem: Fall Risk  Goal: Patient will remain free from falls  Description: Target End Date:  Prior to discharge or change in level of care    Document interventions on the Karen Hirsch Fall Risk Assessment    1.  Assess for fall risk factors  2.  Implement fall precautions  Outcome: Progressing  Note: Patient remains free of falls this shift.       Patient is not progressing towards the following goals:

## 2024-11-01 NOTE — PROGRESS NOTES
Spoke to patients daughter Kristine via telephone tonight. An update was given to Kristine. Kristine would appreciate a call from MD to discuss the next step/plan of care of patient. Kristine's number is listed in the chart, number will be given to day shift nurse during shift report.

## 2024-11-01 NOTE — PROGRESS NOTES
Telemetry Shift Summary printed at 0040    Per monitor tech:  Rhythm SR  HR Range 65-76  Ectopy R PVC, R PAC  Measurements 0.16/0.08/0.38      Normal Values  Rhythm SR  HR Range:   Measurements: 0.12-0.20/0.06-0.10/0.30-0.52

## 2024-11-01 NOTE — PROGRESS NOTES
Telemetry Shift Summary     Per monitor tech:  Rhythm SR  HR Range 71-89  Ectopy rPVC/PAC  Measurements 0.16/0.08/0.36      Normal Values  Rhythm SR  HR Range:   Measurements: 0.12-0.20/0.06-0.10/0.30-0.52

## 2024-11-01 NOTE — DISCHARGE PLANNING
Case Management Discharge Planning    Admission Date: 10/29/2024  GMLOS: 2.8  ALOS: 3    6-Clicks ADL Score: 13  6-Clicks Mobility Score: 15    Anticipated Discharge Dispo: Discharge Disposition: D/T to home under HHA care in anticipation of covered skilled care (06)    @1158  I spoke to patient at bedside. Introduced self and role. Discussed home health recommendations from PT/OT and whether he would be open to home health at his group home. Patient was not sure. I asked patient if I could call group home owner, Steve, and he said yes. I attempted calling Steve's cell phone, [phone], but was not able to reach him, and his voicemail is not set up. I will try again later.     @0365  Second call to [facility], [phone]. [facility] [place]. I called number listed for home, [phone], no answer.     After some diving, I got correct information for the group home. Number is [phone]. Group home is agreeable to home health. They report patient's daughter, Mary, is patient's POA and she lives in [place]. I attempted calling Mary to obtain POA paperwork. Call went straight to voicemail. Patient is A&Ox4 and requested I reach out to Steve for choice; because group home is agreeable, I'll start sending referrals.

## 2024-11-01 NOTE — DISCHARGE PLANNING
Received Choice Form at: 7402  Agency/Facility Name: [facility], [facility]  3rd,  , [facility], [facility]  Outcome: Scanned to media only.  Orders needed

## 2024-11-02 ENCOUNTER — APPOINTMENT (OUTPATIENT)
Dept: RADIOLOGY | Facility: MEDICAL CENTER | Age: 74
DRG: 193 | End: 2024-11-02
Attending: STUDENT IN AN ORGANIZED HEALTH CARE EDUCATION/TRAINING PROGRAM
Payer: COMMERCIAL

## 2024-11-02 PROBLEM — Z78.9 IMPAIRED DECISION MAKING: Chronic | Status: ACTIVE | Noted: 2024-11-02

## 2024-11-02 LAB
AMYLASE FLD-CCNC: 19 U/L
APPEARANCE FLD: NORMAL
BODY FLD TYPE: NORMAL
COLOR FLD: YELLOW
CSF COMMENTS 1658: NORMAL
FUNGUS SPEC FUNGUS STN: NORMAL
GLUCOSE BLD STRIP.AUTO-MCNC: 92 MG/DL (ref 65–99)
GLUCOSE FLD-MCNC: 68 MG/DL
LDH FLD L TO P-CCNC: 125 U/L
LDH SERPL L TO P-CCNC: 211 U/L (ref 107–266)
LYMPHOCYTES NFR FLD: 94 %
MONOS+MACROS NFR FLD MANUAL: 4 %
NEUTROPHILS NFR FLD: 2 %
NUC CELL # FLD: 1009 CELLS/UL
PH FLD: 8 [PH]
PROT FLD-MCNC: 3.3 G/DL
PROT SERPL-MCNC: 7.3 G/DL (ref 6–8.2)
RBC # FLD: 9000 CELLS/UL
SIGNIFICANT IND 70042: NORMAL
SITE SITE: NORMAL
SOURCE SOURCE: NORMAL

## 2024-11-02 PROCEDURE — 99222 1ST HOSP IP/OBS MODERATE 55: CPT | Mod: 25 | Performed by: STUDENT IN AN ORGANIZED HEALTH CARE EDUCATION/TRAINING PROGRAM

## 2024-11-02 PROCEDURE — 32555 ASPIRATE PLEURA W/ IMAGING: CPT | Performed by: STUDENT IN AN ORGANIZED HEALTH CARE EDUCATION/TRAINING PROGRAM

## 2024-11-02 PROCEDURE — 82962 GLUCOSE BLOOD TEST: CPT

## 2024-11-02 PROCEDURE — 84155 ASSAY OF PROTEIN SERUM: CPT

## 2024-11-02 PROCEDURE — 87075 CULTR BACTERIA EXCEPT BLOOD: CPT

## 2024-11-02 PROCEDURE — A9270 NON-COVERED ITEM OR SERVICE: HCPCS | Performed by: HOSPITALIST

## 2024-11-02 PROCEDURE — 94660 CPAP INITIATION&MGMT: CPT

## 2024-11-02 PROCEDURE — 88185 FLOWCYTOMETRY/TC ADD-ON: CPT | Mod: 91

## 2024-11-02 PROCEDURE — 87205 SMEAR GRAM STAIN: CPT | Mod: 91

## 2024-11-02 PROCEDURE — 88305 TISSUE EXAM BY PATHOLOGIST: CPT

## 2024-11-02 PROCEDURE — 87015 SPECIMEN INFECT AGNT CONCNTJ: CPT | Mod: 91

## 2024-11-02 PROCEDURE — 770020 HCHG ROOM/CARE - TELE (206)

## 2024-11-02 PROCEDURE — 87070 CULTURE OTHR SPECIMN AEROBIC: CPT

## 2024-11-02 PROCEDURE — 82150 ASSAY OF AMYLASE: CPT

## 2024-11-02 PROCEDURE — 0W9B3ZZ DRAINAGE OF LEFT PLEURAL CAVITY, PERCUTANEOUS APPROACH: ICD-10-PCS | Performed by: STUDENT IN AN ORGANIZED HEALTH CARE EDUCATION/TRAINING PROGRAM

## 2024-11-02 PROCEDURE — 700105 HCHG RX REV CODE 258: Performed by: HOSPITALIST

## 2024-11-02 PROCEDURE — 36415 COLL VENOUS BLD VENIPUNCTURE: CPT

## 2024-11-02 PROCEDURE — 87206 SMEAR FLUORESCENT/ACID STAI: CPT

## 2024-11-02 PROCEDURE — 83615 LACTATE (LD) (LDH) ENZYME: CPT | Mod: 91

## 2024-11-02 PROCEDURE — 700111 HCHG RX REV CODE 636 W/ 250 OVERRIDE (IP): Mod: JZ | Performed by: HOSPITALIST

## 2024-11-02 PROCEDURE — 94760 N-INVAS EAR/PLS OXIMETRY 1: CPT

## 2024-11-02 PROCEDURE — 87116 MYCOBACTERIA CULTURE: CPT

## 2024-11-02 PROCEDURE — 71045 X-RAY EXAM CHEST 1 VIEW: CPT

## 2024-11-02 PROCEDURE — 84157 ASSAY OF PROTEIN OTHER: CPT

## 2024-11-02 PROCEDURE — 88184 FLOWCYTOMETRY/ TC 1 MARKER: CPT

## 2024-11-02 PROCEDURE — 700111 HCHG RX REV CODE 636 W/ 250 OVERRIDE (IP): Mod: JZ | Performed by: INTERNAL MEDICINE

## 2024-11-02 PROCEDURE — 99233 SBSQ HOSP IP/OBS HIGH 50: CPT | Performed by: INTERNAL MEDICINE

## 2024-11-02 PROCEDURE — 89051 BODY FLUID CELL COUNT: CPT

## 2024-11-02 PROCEDURE — 82945 GLUCOSE OTHER FLUID: CPT

## 2024-11-02 PROCEDURE — 700102 HCHG RX REV CODE 250 W/ 637 OVERRIDE(OP): Performed by: HOSPITALIST

## 2024-11-02 PROCEDURE — 88112 CYTOPATH CELL ENHANCE TECH: CPT

## 2024-11-02 PROCEDURE — 700105 HCHG RX REV CODE 258: Performed by: INTERNAL MEDICINE

## 2024-11-02 PROCEDURE — 87102 FUNGUS ISOLATION CULTURE: CPT

## 2024-11-02 PROCEDURE — 83986 ASSAY PH BODY FLUID NOS: CPT

## 2024-11-02 RX ADMIN — Medication 500 MG: at 17:27

## 2024-11-02 RX ADMIN — AMPICILLIN AND SULBACTAM 3 G: 1; 2 INJECTION, POWDER, FOR SOLUTION INTRAMUSCULAR; INTRAVENOUS at 23:57

## 2024-11-02 RX ADMIN — AMPICILLIN AND SULBACTAM 3 G: 1; 2 INJECTION, POWDER, FOR SOLUTION INTRAMUSCULAR; INTRAVENOUS at 17:30

## 2024-11-02 RX ADMIN — ARIPIPRAZOLE 10 MG: 10 TABLET ORAL at 05:01

## 2024-11-02 RX ADMIN — LATANOPROST 1 DROP: 50 SOLUTION OPHTHALMIC at 20:30

## 2024-11-02 RX ADMIN — DORZOLAMIDE HYDROCHLORIDE AND TIMOLOL MALEATE 1 DROP: 20; 5 SOLUTION/ DROPS OPHTHALMIC at 05:01

## 2024-11-02 RX ADMIN — DORZOLAMIDE HYDROCHLORIDE AND TIMOLOL MALEATE 1 DROP: 20; 5 SOLUTION/ DROPS OPHTHALMIC at 17:30

## 2024-11-02 RX ADMIN — ACETAMINOPHEN 650 MG: 325 TABLET ORAL at 17:27

## 2024-11-02 RX ADMIN — ASPIRIN 81 MG 81 MG: 81 TABLET ORAL at 05:01

## 2024-11-02 RX ADMIN — TAMSULOSIN HYDROCHLORIDE 0.4 MG: 0.4 CAPSULE ORAL at 09:12

## 2024-11-02 RX ADMIN — ENOXAPARIN SODIUM 40 MG: 100 INJECTION SUBCUTANEOUS at 17:27

## 2024-11-02 RX ADMIN — AMPICILLIN AND SULBACTAM 3 G: 1; 2 INJECTION, POWDER, FOR SOLUTION INTRAMUSCULAR; INTRAVENOUS at 12:52

## 2024-11-02 RX ADMIN — AMPICILLIN AND SULBACTAM 3 G: 1; 2 INJECTION, POWDER, FOR SOLUTION INTRAMUSCULAR; INTRAVENOUS at 05:01

## 2024-11-02 ASSESSMENT — ENCOUNTER SYMPTOMS
COUGH: 1
SHORTNESS OF BREATH: 1
WEAKNESS: 1

## 2024-11-02 ASSESSMENT — FIBROSIS 4 INDEX: FIB4 SCORE: 5.74

## 2024-11-02 ASSESSMENT — PAIN DESCRIPTION - PAIN TYPE: TYPE: ACUTE PAIN

## 2024-11-02 NOTE — CONSULTS
Critical Care Consultation    Date of consult: 11/2/2024    Referring Physician  Dereck Rajput M.D.    Reason for Consultation  Pleural effusion    History of Presenting Illness  74 y.o. male with history of CML on immunotherapy, hypertension, depression, glaucoma and bipolar disorder/depression, disability and lives in group home for care with his ADLs who presented 10/29/2024 with dyspnea, cough and fever. He was noted to be positive for rhinovirus/enterovirus. CTPA showed b/l effusions and rounded consolidations of left lower lobe c/f infection or possible malignancy.    Code Status  Full Code    Review of Systems  Review of Systems   Unable to perform ROS: Medical condition       Past Medical History   has a past medical history of Bowel habit changes, Glaucoma, Hypertension, Psychiatric problem, Renal disorder, Shortness of breath, Unspecified cataract, Urinary bladder disorder, and Urinary incontinence (01/02/2018).    He has no past medical history of Painful breathing or Wheezing.    Surgical History   has a past surgical history that includes cataract phaco with iol; hchg implant glaucoma baerveldt; rotator cuff repair; other orthopedic surgery; other; baerveldt implant (Right, 9/4/2015); and penetrating keratoplasty (Right, 1/30/2018).    Family History  family history is not on file.    Social History   reports that he has never smoked. He has never used smokeless tobacco. He reports that he does not drink alcohol and does not use drugs.    Medications  Home Medications       Reviewed by Darrius ClineD (Pharmacist) on 10/29/24 at 2217  Med List Status: Complete     Medication Last Dose Status   allopurinol (ZYLOPRIM) 300 MG Tab unk Active   ARIPiprazole (ABILIFY) 10 MG Tab unk Active   artificial tears 1.4 % Solution unk Active   ASPIRIN LOW DOSE 81 MG Chew Tab chewable tablet unk Active   benzonatate (TESSALON) 100 MG Cap unk Active   chlorhexidine (PERIDEX) 0.12 % Solution  Active    Cholecalciferol (VITAMIN D) 2000 UNIT Tab unk Active   Cyanocobalamin (VITAMIN B-12) 1000 MCG Tab unk Active   dasatinib (SPRYCEL) 50 MG tablet unk Active   docusate sodium (COLACE) 100 MG Cap unk Active   dorzolamide-timolol (COSOPT) 22.3-6.8 MG/ML Solution unk Active   guaifenesin dextromethorphan sugar free (ROBAFEN DM COUGH)  MG/5ML Liquid soln unk Active   ibuprofen (MOTRIN) 200 MG Tab unk Active   latanoprost (XALATAN) 0.005 % Solution unk Active   Lidocaine-Glycerin (PREPARATION H EX) prn Active   loperamide (IMODIUM) 2 MG Cap prn Active   Mirabegron ER (MYRBETRIQ) 50 MG TABLET SR 24 HR unk Active   niacin 500 MG Tab unk Active   phosphorus (PHOSPHA 250 NEUTRAL) 250 MG tablet unk Active   polyethylene glycol/lytes (MIRALAX) 17 g Pack prn Active   tamsulosin (FLOMAX) 0.4 MG capsule unk Active   triamcinolone acetonide (KENALOG) 0.1 % Cream prn Active                  Audit from Redirected Encounters    **Home medications have not yet been reviewed for this encounter**       Current Facility-Administered Medications   Medication Dose Route Frequency Provider Last Rate Last Admin    Respiratory Therapy Consult   Nebulization Continuous RT Mallika March M.D.        enoxaparin (Lovenox) inj 40 mg  40 mg Subcutaneous DAILY AT 1800 Rachael Bishop M.D.   40 mg at 11/01/24 1816    ARIPiprazole (Abilify) tablet 10 mg  10 mg Oral DAILY Mallika March M.D.   10 mg at 11/02/24 0501    aspirin (Asa) chewable tab 81 mg  81 mg Oral DAILY Mallika March M.D.   81 mg at 11/02/24 0501    benzonatate (Tessalon) capsule 100 mg  100 mg Oral TID PRN Mallika March M.D.        dorzolamide-timolol (Cosopt) 2-0.5 % ophthalmic drops 1 Drop  1 Drop Right Eye BID Mallika March M.D.   1 Drop at 11/02/24 0501    latanoprost (Xalatan) 0.005 % ophthalmic solution 1 Drop  1 Drop Right Eye Nightly Mallika March M.D.   1 Drop at 11/01/24 2042    niacin tablet 500 mg  500 mg Oral  Q EVENING Mallika March M.D.   500 mg at 11/01/24 1816    tamsulosin (Flomax) capsule 0.4 mg  0.4 mg Oral AFTER BREAKFAST Mallika March M.D.   0.4 mg at 11/02/24 0912    acetaminophen (Tylenol) tablet 650 mg  650 mg Oral Q6HRS PRN Mallika March M.D.        [Held by provider] senna-docusate (Pericolace Or Senokot S) 8.6-50 MG per tablet 2 Tablet  2 Tablet Oral BID Mallika March M.D.   2 Tablet at 10/30/24 0525    And    [Held by provider] polyethylene glycol/lytes (Miralax) Packet 1 Packet  1 Packet Oral QDAY PRN Mallika March M.D.        ondansetron (Zofran) syringe/vial injection 4 mg  4 mg Intravenous Q4HRS PRN Mallika March M.D.        ondansetron (Zofran ODT) dispertab 4 mg  4 mg Oral Q4HRS PRN Mallika March M.D.        ampicillin/sulbactam (Unasyn) 3 g in  mL IVPB  3 g Intravenous Q6HR Mallika March M.D.   Stopped at 11/02/24 1322       Allergies  Allergies   Allergen Reactions    Celexa Hives     And paranoia     Zoloft Hives     paranoia       Vital Signs last 24 hours  Temp:  [36.4 °C (97.6 °F)-37.4 °C (99.3 °F)] 36.4 °C (97.6 °F)  Pulse:  [57-78] 57  Resp:  [14-26] 20  BP: (117-127)/(66-77) 126/74  SpO2:  [92 %-98 %] 97 %    Physical Exam  Physical Exam  Constitutional:       General: He is not in acute distress.  HENT:      Mouth/Throat:      Mouth: Mucous membranes are moist.   Cardiovascular:      Rate and Rhythm: Normal rate and regular rhythm.   Pulmonary:      Effort: Pulmonary effort is normal. No respiratory distress.   Abdominal:      Palpations: Abdomen is soft.   Musculoskeletal:         General: No deformity.   Skin:     General: Skin is warm and dry.   Neurological:      Comments: Able to answer yes/no and simple questions and moves all extremities. Does not have capacity to consent or make complex medical decisions         Fluids    Intake/Output Summary (Last 24 hours) at 11/2/2024 1532  Last data filed at  11/2/2024 1136  Gross per 24 hour   Intake 690 ml   Output 2600 ml   Net -1910 ml       Laboratory  Recent Results (from the past 48 hour(s))   MAGNESIUM    Collection Time: 11/01/24  1:19 AM   Result Value Ref Range    Magnesium 2.2 1.5 - 2.5 mg/dL   Renal Function Panel    Collection Time: 11/01/24  1:19 AM   Result Value Ref Range    Sodium 138 135 - 145 mmol/L    Potassium 3.9 3.6 - 5.5 mmol/L    Chloride 104 96 - 112 mmol/L    Co2 24 20 - 33 mmol/L    Glucose 86 65 - 99 mg/dL    Creatinine 1.08 0.50 - 1.40 mg/dL    Bun 18 8 - 22 mg/dL    Calcium 8.4 8.4 - 10.2 mg/dL    Correct Calcium 9.0 8.5 - 10.5 mg/dL    Phosphorus 3.1 2.5 - 4.5 mg/dL    Albumin 3.2 3.2 - 4.9 g/dL   CBC WITHOUT DIFFERENTIAL    Collection Time: 11/01/24  1:19 AM   Result Value Ref Range    WBC 6.1 4.8 - 10.8 K/uL    RBC 4.00 (L) 4.70 - 6.10 M/uL    Hemoglobin 11.8 (L) 14.0 - 18.0 g/dL    Hematocrit 36.6 (L) 42.0 - 52.0 %    MCV 91.5 81.4 - 97.8 fL    MCH 29.5 27.0 - 33.0 pg    MCHC 32.2 (L) 32.3 - 36.5 g/dL    RDW 57.8 (H) 35.9 - 50.0 fL    Platelet Count 123 (L) 164 - 446 K/uL    MPV 8.9 (L) 9.0 - 12.9 fL   D-DIMER    Collection Time: 11/01/24  1:19 AM   Result Value Ref Range    D-Dimer 2.84 (H) 0.00 - 0.50 ug/mL (FEU)   ESTIMATED GFR    Collection Time: 11/01/24  1:19 AM   Result Value Ref Range    GFR (CKD-EPI) 72 >60 mL/min/1.73 m 2   Respiratory Panel by PCR (Inpatient ONLY)    Collection Time: 11/01/24 10:40 AM    Specimen: Nasopharyngeal; Respirate   Result Value Ref Range    Adenovirus, PCR Not Detected     SARS-CoV-2 (COVID-19) RNA by PABLO NotDetected     Coronavirus 229E, PCR Not Detected     Coronavirus HKU1, PCR Not Detected     Coronavirus NL63, PCR Not Detected     Coronavirus OC43, PCR Not Detected     Human Metapneumovirus, PCR Not Detected     Rhino/Enterovirus, PCR DETECTED (A)     Influenza A, PCR Not Detected     Influenza B, PCR Not Detected     Parainfluenza 1, PCR Not Detected     Parainfluenza 2, PCR Not Detected      Parainfluenza 3, PCR Not Detected     Parainfluenza 4, PCR Not Detected     RSV (Respiratory Syncytial Virus), PCR Not Detected     Bordetella parapertussis (JV0331), PCR Not Detected     Bordetella pertussis (ptxP), PCR Not Detected     Mycoplasma pneumoniae, PCR Not Detected     Chlamydia pneumoniae, PCR Not Detected        Imaging  Reviewed     Assessment/Plan    Bilateral pleural effusions  Left lower lobe consolidations  On CXR in 12/2023, had this opacity in the LLL and c/f effusion at that time as well so wonder if this is same process worsening or these are separate infectious processes.   - plan for L thoracentesis - discussed with patient's daughter, Kristine, who is also his guardian and decision maker - she consented to the procedure.  - f/u pleural fluid studies  - IPV if able to participate  - supportive care for rhino/enterovirus  - will need repeat imaging outpatient to follow these consolidations to resolution vs discuss regarding further diagnostic options        Charlotte Graham MD  Pulmonary and Critical Care Medicine  [facility]

## 2024-11-02 NOTE — PROGRESS NOTES
Telemetry Shift Summary printed at 0015    Per monitor tech:  Rhythm SR  HR Range 60-68  Ectopy R PVC, R  PAC  Measurements 0.16/0.08/0.38       Normal Values  Rhythm SR  HR Range:   Measurements: 0.12-0.20/0.06-0.10/0.30-0.52

## 2024-11-02 NOTE — PROCEDURES
"Thoracentesis    Date/Time: 11/2/2024 4:18 PM    Performed by: Charlotte Graham M.D.  Authorized by: Charlotte Graham M.D.    Consent:     Consent obtained:  Verbal    Consent given by:  Guardian    Risks, benefits, and alternatives were discussed: yes    Universal protocol:     Relevant documents present and verified: yes      Test results available: yes      Imaging studies available: yes      Required blood products, implants, devices, and special equipment available: yes      Site/side marked: yes      Immediately prior to procedure, a time out was called: yes      Patient identity confirmed:  Arm band  Sedation:     Sedation type:  None  Anesthesia:     Anesthesia method:  Local infiltration    Local anesthetic:  Lidocaine 1% w/o epi  Procedure details:     Preparation: Patient was prepped and draped in usual sterile fashion      Patient position:  Sitting    Location:  L lateral    Puncture method:  Over-the-needle catheter    Ultrasound guidance: yes      Indwelling catheter placed: no      Catheter size: 5fr.    Number of attempts:  1    Fluid characteristics: clear gracie colored fluid.  Post-procedure details:     Post-procedure chest x-ray: ordered.      Procedure completion:  Procedure terminated electively by provider  Comments:      Removed about 800cc, patient was falling asleep during the procedure and I had to support him by holding him from the back. When we tried to wake him up to have him move forward, he suddenly woke up and then said \"I can't breathe\" and suddenly clenched his jaw and started sliding off the bed. We had to catch him and I terminated the procedure and we laid him down flat on the bed. His jaw was still clenched and he seemed to have some purple/red discoloration to his face. He didn't respond to sternal rub or pinching him but then when asked to look at me, he turned his head towards me. He then started talking said he had some pain in his back at the site of procedure. He " followed commands. During this entire time, his SpO2 remained around 95% and his BP remained in the 140s and HR in 50-60s. He did not have any focal/general seizure like activity. BG in 90s. I suspect he woke up startled and clenched down and had almost a mini vagal sort of response.   Removed a total of 800cc of fluid           Charlotte Graham MD  Pulmonary and Critical Care Medicine  [facility]

## 2024-11-02 NOTE — CARE PLAN
Problem: Respiratory  Goal: Patient will achieve/maintain optimum respiratory ventilation and gas exchange  Outcome: Progressing     Problem: Fall Risk  Goal: Patient will remain free from falls  Outcome: Progressing   The patient is Stable - Low risk of patient condition declining or worsening    Shift Goals  Clinical Goals: IV antiobiotics, wean O2, ambulate to chair  Patient Goals: Rest  Family Goals: VANESSA    Progress made toward(s) clinical / shift goals:  Pt up to chair with assistance. Educated on importance of turning, coughing, and deep breathing.      Patient is not progressing towards the following goals: Requires reinforcement of ICS due to visual deficits.

## 2024-11-02 NOTE — CARE PLAN
The patient is Stable - Low risk of patient condition declining or worsening    Shift Goals  Clinical Goals: IV ABX, wean O2, safety  Patient Goals: Sleep  Family Goals: VANESSA    Progress made toward(s) clinical / shift goals:    Problem: Knowledge Deficit - Standard  Goal: Patient and family/care givers will demonstrate understanding of plan of care, disease process/condition, diagnostic tests and medications  Description: Target End Date:  1-3 days or as soon as patient condition allows    Document in Patient Education    1.  Patient and family/caregiver oriented to unit, equipment, visitation policy and means for communicating concern  2.  Complete/review Learning Assessment  3.  Assess knowledge level of disease process/condition, treatment plan, diagnostic tests and medications  4.  Explain disease process/condition, treatment plan, diagnostic tests and medications  Outcome: Progressing     Problem: Respiratory  Goal: Patient will achieve/maintain optimum respiratory ventilation and gas exchange  Description: Target End Date:  Prior to discharge or change in level of care    Document on Assessment flowsheet    1.  Assess and monitor rate, rhythm, depth and effort of respiration  2.  Breath sounds assessed qshift and/or as needed  3.  Assess O2 saturation, administer/titrate oxygen as ordered  4.  Position patient for maximum ventilatory efficiency  5.  Turn, cough, and deep breath with splinting to improve effectiveness  6.  Collaborate with RT to administer medication/treatments per order  7.  Encourage use of incentive spirometer and encourage patient to cough after use and utilize splinting techniques if applicable  8.  Airway suctioning  9.  Monitor sputum production for changes in color, consistency and frequency  10. Perform frequent oral hygiene  11. Alternate physical activity with rest periods  Outcome: Progressing  Note: Patient remains on supplemental Oxygen, however his wheezing has decreased this  shift.     Problem: Urinary Elimination  Goal: Establish and maintain regular urinary output  Description: Target End Date:  Prior to discharge or change in level of care    Document on I/O and Assessment flowsheets    1.  Evaluate need to continue indwelling catheter every shift  2.  Assess signs and symptoms of urinary retention  3.  Assess post-void residual volumes  4.  Implement bladder training program  5.  Encourage scheduled voidings  6.  Assist patient to sit on bedside commode or toilet for voiding  7.  Educate patient and family/caregiver on use and purpose of urine collection devices (document in Patient Education)  Outcome: Progressing  Note: Patient has good urinary output this shift      Problem: Fall Risk  Goal: Patient will remain free from falls  Description: Target End Date:  Prior to discharge or change in level of care    Document interventions on the Karen Hirsch Fall Risk Assessment    1.  Assess for fall risk factors  2.  Implement fall precautions  Outcome: Progressing  Note: Patient remains free of falls this shift.       Patient is not progressing towards the following goals:

## 2024-11-02 NOTE — FLOWSHEET NOTE
11/02/24 1136   Events/Summary/Plan   Events/Summary/Plan 02 pulse ox check   Vital Signs   Pulse 63   Respiration 14   Pulse Oximetry 97 %   $ Pulse Oximetry (Spot Check) Yes   Respiratory Assessment   Level of Consciousness Responds to voice  (sleeping)   Chest Exam   Work Of Breathing / Effort Shallow;Within Normal Limits   Oxygen   O2 (LPM) 3   O2 Delivery Device Silicone Nasal Cannula

## 2024-11-02 NOTE — PROGRESS NOTES
Hospital Medicine Daily Progress Note    Date of Service  11/1/2024    Chief Complaint  Austin Woodard is a 74 y.o. male admitted 10/29/2024 with fever, shortness of breath    Hospital Course  No notes on file    Interval Problem Update  11/01:  Patient was still reporting cough.  I checked a viral panel PCR which showed positive for rhinovirus or enterovirus.  Started isolation precautions.  I ordered a CTA chest as D-dimer was elevated at 2.8.  I reviewed the results there is no large PE, indeterminant on peripheral regions.  There is also a moderate to large left pleural effusion, small to moderate right pleural effusion.  Radiologist noting there may be a consolidation or masslike finding on the left lower lobe.    10/31:  Patient appearing lethargic.  He is legally blind.  He is currently living in a group home.  I reviewed labs, hemoglobin 12.5, WBC 7.9, platelets 144.  Sodium 139, potassium 4.2, creatinine 1.1, magnesium 2.1, Phos 2.4.  I ordered a procalcitonin level  Continue Unasyn and azithromycin  Patient lives at group home, we will need PT OT evaluation prior  Echocardiogram pending  Negative for COVID/flu/influenza on admission    I have discussed this patient's plan of care and discharge plan at IDT rounds today with Case Management, Nursing, Nursing leadership, and other members of the IDT team.    Consultants/Specialty  None    Code Status  Full Code    Disposition  The patient is not medically cleared for discharge to home or a post-acute facility.  Anticipate discharge to: home with organized home healthcare and close outpatient follow-up    I have placed the appropriate orders for post-discharge needs.    Review of Systems  Review of Systems   Constitutional:  Positive for malaise/fatigue.   Eyes:         Chronically blind   Respiratory:  Positive for cough and shortness of breath.    Neurological:  Positive for weakness.   All other systems reviewed and are negative.       Physical Exam  Temp:   [36.3 °C (97.3 °F)-37.4 °C (99.3 °F)] 37 °C (98.6 °F)  Pulse:  [62-92] 71  Resp:  [18-30] 20  BP: (121-141)/(67-79) 128/79  SpO2:  [93 %-97 %] 94 %    Physical Exam  Vitals and nursing note reviewed.   Constitutional:       General: He is not in acute distress.     Appearance: He is ill-appearing and toxic-appearing. He is not diaphoretic.   HENT:      Mouth/Throat:      Mouth: Mucous membranes are dry.      Pharynx: No oropharyngeal exudate.   Eyes:      Comments: Bilaterally blind    Cardiovascular:      Rate and Rhythm: Normal rate and regular rhythm.      Pulses: Normal pulses.      Heart sounds: Normal heart sounds. No murmur heard.  Pulmonary:      Effort: Pulmonary effort is normal. No respiratory distress.      Breath sounds: Rhonchi and rales present. No wheezing.   Abdominal:      General: Bowel sounds are normal. There is no distension.      Tenderness: There is no abdominal tenderness.   Musculoskeletal:         General: No swelling or tenderness. Normal range of motion.      Right lower leg: No edema.      Left lower leg: No edema.   Skin:     General: Skin is warm.      Capillary Refill: Capillary refill takes less than 2 seconds.      Coloration: Skin is not jaundiced or pale.   Neurological:      Mental Status: He is alert and oriented to person, place, and time. Mental status is at baseline.      Motor: Weakness present.   Psychiatric:         Mood and Affect: Mood normal.         Behavior: Behavior normal.         Thought Content: Thought content normal.         Judgment: Judgment normal.         Fluids    Intake/Output Summary (Last 24 hours) at 11/1/2024 1848  Last data filed at 11/1/2024 1800  Gross per 24 hour   Intake --   Output 2500 ml   Net -2500 ml        Laboratory  Recent Labs     10/30/24  0300 10/31/24  0025 11/01/24  0119   WBC 8.7 7.9 6.1   RBC 4.28* 4.21* 4.00*   HEMOGLOBIN 12.6* 12.5* 11.8*   HEMATOCRIT 39.3* 37.8* 36.6*   MCV 91.8 89.8 91.5   MCH 29.4 29.7 29.5   MCHC 32.1* 33.1  32.2*   RDW 57.0* 55.9* 57.8*   PLATELETCT 115* 144* 123*   MPV 9.0 9.2 8.9*     Recent Labs     10/30/24  0300 10/31/24  0025 11/01/24  0119   SODIUM 141 139 138   POTASSIUM 4.0 4.2 3.9   CHLORIDE 107 105 104   CO2 24 21 24   GLUCOSE 88 90 86   BUN 11 16 18   CREATININE 1.07 1.10 1.08   CALCIUM 8.1* 8.8 8.4     Recent Labs     10/29/24  1916   INR 1.25*               Imaging  CT-CTA CHEST PULMONARY ARTERY W/ RECONS   Final Result      1.  No evidence of pulmonary embolism. Cannot exclude segmental and subsegmental pulmonary emboli in the left lower lobe due to perfusion abnormalities.   2.  Moderate/large partially loculated left pleural effusion with adjacent consolidation. Atelectasis versus pneumonia.   3.  Rounded consolidations of the left lower lobe. These may represent infectious or inflammatory consolidations, though are concerning for masses given shape. Recommend follow-up.   4.  Small/moderate right pleural effusion with adjacent right lower lobe atelectasis.   5.  Bilateral gynecomastia.   6.  Borderline ascending aortic ectasia measuring 4.0 cm.            EC-ECHOCARDIOGRAM COMPLETE W/O CONT   Final Result      DX-CHEST-PORTABLE (1 VIEW)   Final Result      1.  Chronic blunting of the left costophrenic angle, pleural thickening and/or small effusion.   2.  Probable small right pleural effusion.   3.  Bibasilar atelectasis and/or edema. Correlate clinically for infection.           Assessment/Plan  * Multifocal pneumonia- (present on admission)  Assessment & Plan  Found to have acute viral pneumonia, and superimposed bacterial pneumonia    Patient had low-grade fever 100.9 F on 10/29  I reviewed chest x-ray, showed left pleural effusion and possible consolidation hidden    I checked a viral panel PCR which showed positive for rhinovirus or enterovirus.  Started isolation precautions.  I ordered a CTA chest as D-dimer was elevated at 2.8.  I reviewed the results there is no large PE, indeterminant on  peripheral regions.  There is also a moderate to large left pleural effusion, small to moderate right pleural effusion.  Radiologist noting there may be a consolidation or masslike finding on the left lower lobe (see image below).  Continue IV Unasyn and azithromycin        Discharge planning issues- (present on admission)  Assessment & Plan  Pt lives at [facility]  PT and OT recommended home health.  I placed home health orders    Elevated brain natriuretic peptide (BNP) level- (present on admission)  Assessment & Plan  Patient BNP is elevated. pending echocardiogram      ACP (advance care planning)- (present on admission)  Assessment & Plan  Patient is a full code as per daughter    Acute encephalopathy- (present on admission)  Assessment & Plan  The patient is lethargic but easily arousable. Alert and oriented x 3-4 when awake  Likely metabolic likely secondary to hypoxia and pneumonia  Anticipate improvement with correcting/treating pneumonia and hypoxia  Continue treatment for pneumonia and hypoxia    Elevated troponin secondary to demand ischemia (HCC)- (present on admission)  Assessment & Plan  -Initial troponin is 20 ng/L.  This is likely demand ischemia from above but I will repeat troponin test in the morning and monitor him on telemetry.-Added EKG    10/30/2024   Troponin trend 27, 28 and 29.  No chest pain.    This is likely secondary to demand ischemia secondary to hypoxia, and secondary to reduced renal clearance secondary to CKD     Monitoring on telemetry    Normocytic anemia- (present on admission)  Assessment & Plan  Hemoglobin 12.5, platelets 144  Monitoring    Hypoxia- (present on admission)  Assessment & Plan  Hypoxia is due to acute viral pneumonia and acute bacterial community-acquired pneumonia    SALENA (obstructive sleep apnea)- (present on admission)  Assessment & Plan  Continue CPAP    CPAP (continuous positive airway pressure) dependence- (present on admission)  Assessment &  Plan  Continue CPAP    Thrombocytopenia (HCC)- (present on admission)  Assessment & Plan  -Platelets are 130.  Will monitor CBC in the morning.    Platelets 123, monitoring.  No transfusions at this time    Bipolar 1 disorder (HCC)- (present on admission)  Assessment & Plan  Continue Abilify    Chronic myelocytic leukemia (HCC)- (present on admission)  Assessment & Plan  WBC on admission is 11.9.  Patient is on dasatinib.  Follows with Dr. Valdez  Dasatinib use increase the risk of infections by 9% - 14%, also associated with bone marrow suppression, anemia and thrombocytopenia     Continue to hold home Dasatinib, given current infection with pneumonia      Benign prostatic hyperplasia with lower urinary tract symptoms- (present on admission)  Assessment & Plan  Continue tamsulosin    Primary open angle glaucoma- (present on admission)  Assessment & Plan  Continue home glaucoma meds         VTE prophylaxis:    enoxaparin ppx      I have performed a physical exam and reviewed and updated ROS and Plan today (11/1/2024). In review of yesterday's note (10/31/2024), there are no changes except as documented above.      Total time spent 52 minutes.    This included my review of patient's overnight RN notes, face to face interview, physical examination, lab analysis.  Also includes repeat visits with the patient, and my documented assessments and interventions above.  In addition, I spoke with entire care team on patient's treatment plan, and DC planning on morning rounds and IDT rounds.    This note was generated using voice recognition software which has a chance of producing errors of grammar and content.  I have made every reasonable attempt to find and correct any errors, but it should be expected that some may not be found prior to finalization of this note.

## 2024-11-02 NOTE — CARE PLAN
The patient is Stable - Low risk of patient condition declining or worsening    Shift Goals  Clinical Goals: ABX, decreased weezing, wean 02, no s/s of clot  Patient Goals: Comfort  Family Goals: Updates    Progress made toward(s) clinical / shift goals: No c/o pain or discomfort. Pt aox4. Pt had CT chest with no evidence of PE. JE.    Patient is not progressing towards the following goals:      Problem: Discharge Barriers/Planning  Goal: Patient's continuum of care needs are met  11/1/2024 1751 by Stefano Santana RLUCRECIA.  Outcome: Progressing  11/1/2024 1751 by Stefano Santana R.N.  Outcome: Progressing  Pt positive for rhinovirus     Problem: Mobility  Goal: Patient's capacity to carry out activities will improve  Outcome: Progressing

## 2024-11-02 NOTE — PROGRESS NOTES
Telemetry Shift Summary    Per monitor tech:  Rhythm SR/SB  HR Range 65-76  Ectopy rPVC/PAC  Measurements 0.16/0.10/0.38      Normal Values  Rhythm SR  HR Range:   Measurements: 0.12-0.20/0.06-0.10/0.30-0.52    No

## 2024-11-02 NOTE — DISCHARGE PLANNING
Case Management Discharge Planning    Admission Date: 10/29/2024  GMLOS: 4  ALOS: 4    6-Clicks ADL Score: 13  6-Clicks Mobility Score: 15  PT and/or OT Eval ordered: Yes  Post-acute Referrals Ordered: No  Post-acute Choice Obtained: NA  Has referral(s) been sent to post-acute provider:  [facility]      Anticipated Discharge Dispo: Discharge Disposition: D/T to home under [facility] care in anticipation of covered skilled care (06)    DME Needed: No    Action(s) Taken: Updated Provider/Nurse on Discharge Plan and Referral(s) sent    LSW reviewed pt's chart. [facility] has declined referral. Referral sent to pt's second choice, [facility].     0828-  Received call from Anca with [facility]. Anca reported they would need to run the benefits on pt's insurance, stating she is unsure if pt will have copays. Anca stated she will see if they can run the benefits today, if not, it will be Monday. Anca also stated they wouldn't be able to start service until Tuesday/Wednesday.     1030-  Pt discussed in IDT rounds. Pt is not anticipated to discharge until Monday.     Escalations Completed: None    Medically Clear: No    Next Steps: LSW to follow    Barriers to Discharge: Medical clearance    Is the patient up for discharge tomorrow: No

## 2024-11-03 ENCOUNTER — APPOINTMENT (OUTPATIENT)
Dept: RADIOLOGY | Facility: MEDICAL CENTER | Age: 74
DRG: 193 | End: 2024-11-03
Attending: INTERNAL MEDICINE
Payer: COMMERCIAL

## 2024-11-03 LAB
ALBUMIN SERPL BCP-MCNC: 2.8 G/DL (ref 3.2–4.9)
BACTERIA BLD CULT: NORMAL
BACTERIA BLD CULT: NORMAL
BUN SERPL-MCNC: 19 MG/DL (ref 8–22)
CALCIUM ALBUM COR SERPL-MCNC: 9.5 MG/DL (ref 8.5–10.5)
CALCIUM SERPL-MCNC: 8.5 MG/DL (ref 8.4–10.2)
CHLORIDE SERPL-SCNC: 105 MMOL/L (ref 96–112)
CO2 SERPL-SCNC: 20 MMOL/L (ref 20–33)
CREAT SERPL-MCNC: 1.02 MG/DL (ref 0.5–1.4)
ERYTHROCYTE [DISTWIDTH] IN BLOOD BY AUTOMATED COUNT: 55.2 FL (ref 35.9–50)
GFR SERPLBLD CREATININE-BSD FMLA CKD-EPI: 78 ML/MIN/1.73 M 2
GLUCOSE SERPL-MCNC: 91 MG/DL (ref 65–99)
GRAM STN SPEC: NORMAL
HCT VFR BLD AUTO: 37.6 % (ref 42–52)
HGB BLD-MCNC: 12.2 G/DL (ref 14–18)
MAGNESIUM SERPL-MCNC: 2.1 MG/DL (ref 1.5–2.5)
MCH RBC QN AUTO: 29.8 PG (ref 27–33)
MCHC RBC AUTO-ENTMCNC: 32.4 G/DL (ref 32.3–36.5)
MCV RBC AUTO: 91.9 FL (ref 81.4–97.8)
MYCOBACTERIUM SPEC CULT: NORMAL
PHOSPHATE SERPL-MCNC: 2.6 MG/DL (ref 2.5–4.5)
PLATELET # BLD AUTO: 145 K/UL (ref 164–446)
PMV BLD AUTO: 9.3 FL (ref 9–12.9)
POTASSIUM SERPL-SCNC: 4 MMOL/L (ref 3.6–5.5)
RBC # BLD AUTO: 4.09 M/UL (ref 4.7–6.1)
RHODAMINE-AURAMINE STN SPEC: NORMAL
RHODAMINE-AURAMINE STN SPEC: NORMAL
SIGNIFICANT IND 70042: NORMAL
SITE SITE: NORMAL
SODIUM SERPL-SCNC: 137 MMOL/L (ref 135–145)
SOURCE SOURCE: NORMAL
WBC # BLD AUTO: 5.3 K/UL (ref 4.8–10.8)

## 2024-11-03 PROCEDURE — 700111 HCHG RX REV CODE 636 W/ 250 OVERRIDE (IP): Mod: JZ | Performed by: HOSPITALIST

## 2024-11-03 PROCEDURE — 700111 HCHG RX REV CODE 636 W/ 250 OVERRIDE (IP): Performed by: INTERNAL MEDICINE

## 2024-11-03 PROCEDURE — 36415 COLL VENOUS BLD VENIPUNCTURE: CPT

## 2024-11-03 PROCEDURE — A9270 NON-COVERED ITEM OR SERVICE: HCPCS | Performed by: HOSPITALIST

## 2024-11-03 PROCEDURE — 770020 HCHG ROOM/CARE - TELE (206)

## 2024-11-03 PROCEDURE — 700102 HCHG RX REV CODE 250 W/ 637 OVERRIDE(OP): Performed by: HOSPITALIST

## 2024-11-03 PROCEDURE — 700111 HCHG RX REV CODE 636 W/ 250 OVERRIDE (IP): Mod: JZ | Performed by: INTERNAL MEDICINE

## 2024-11-03 PROCEDURE — 99233 SBSQ HOSP IP/OBS HIGH 50: CPT | Performed by: INTERNAL MEDICINE

## 2024-11-03 PROCEDURE — 83735 ASSAY OF MAGNESIUM: CPT

## 2024-11-03 PROCEDURE — 85027 COMPLETE CBC AUTOMATED: CPT

## 2024-11-03 PROCEDURE — 700105 HCHG RX REV CODE 258: Performed by: INTERNAL MEDICINE

## 2024-11-03 PROCEDURE — 99232 SBSQ HOSP IP/OBS MODERATE 35: CPT | Performed by: STUDENT IN AN ORGANIZED HEALTH CARE EDUCATION/TRAINING PROGRAM

## 2024-11-03 PROCEDURE — 80069 RENAL FUNCTION PANEL: CPT

## 2024-11-03 PROCEDURE — 71045 X-RAY EXAM CHEST 1 VIEW: CPT

## 2024-11-03 PROCEDURE — 94760 N-INVAS EAR/PLS OXIMETRY 1: CPT

## 2024-11-03 PROCEDURE — 94660 CPAP INITIATION&MGMT: CPT

## 2024-11-03 RX ORDER — FUROSEMIDE 10 MG/ML
40 INJECTION INTRAMUSCULAR; INTRAVENOUS
Status: DISCONTINUED | OUTPATIENT
Start: 2024-11-03 | End: 2024-11-05

## 2024-11-03 RX ADMIN — LATANOPROST 1 DROP: 50 SOLUTION OPHTHALMIC at 20:22

## 2024-11-03 RX ADMIN — AMPICILLIN AND SULBACTAM 3 G: 1; 2 INJECTION, POWDER, FOR SOLUTION INTRAMUSCULAR; INTRAVENOUS at 05:06

## 2024-11-03 RX ADMIN — ENOXAPARIN SODIUM 40 MG: 100 INJECTION SUBCUTANEOUS at 17:50

## 2024-11-03 RX ADMIN — AMPICILLIN AND SULBACTAM 3 G: 1; 2 INJECTION, POWDER, FOR SOLUTION INTRAMUSCULAR; INTRAVENOUS at 18:10

## 2024-11-03 RX ADMIN — AMPICILLIN AND SULBACTAM 3 G: 1; 2 INJECTION, POWDER, FOR SOLUTION INTRAMUSCULAR; INTRAVENOUS at 12:33

## 2024-11-03 RX ADMIN — BENZONATATE 100 MG: 100 CAPSULE ORAL at 18:13

## 2024-11-03 RX ADMIN — AMPICILLIN AND SULBACTAM 3 G: 1; 2 INJECTION, POWDER, FOR SOLUTION INTRAMUSCULAR; INTRAVENOUS at 22:43

## 2024-11-03 RX ADMIN — ASPIRIN 81 MG 81 MG: 81 TABLET ORAL at 05:08

## 2024-11-03 RX ADMIN — Medication 500 MG: at 17:50

## 2024-11-03 RX ADMIN — DORZOLAMIDE HYDROCHLORIDE AND TIMOLOL MALEATE 1 DROP: 20; 5 SOLUTION/ DROPS OPHTHALMIC at 05:08

## 2024-11-03 RX ADMIN — FUROSEMIDE 40 MG: 10 INJECTION, SOLUTION INTRAMUSCULAR; INTRAVENOUS at 17:50

## 2024-11-03 RX ADMIN — DORZOLAMIDE HYDROCHLORIDE AND TIMOLOL MALEATE 1 DROP: 20; 5 SOLUTION/ DROPS OPHTHALMIC at 17:50

## 2024-11-03 RX ADMIN — ARIPIPRAZOLE 10 MG: 10 TABLET ORAL at 05:08

## 2024-11-03 RX ADMIN — TAMSULOSIN HYDROCHLORIDE 0.4 MG: 0.4 CAPSULE ORAL at 08:40

## 2024-11-03 ASSESSMENT — ENCOUNTER SYMPTOMS
COUGH: 1
SHORTNESS OF BREATH: 1
WEAKNESS: 1

## 2024-11-03 NOTE — PROGRESS NOTES
Telemetry Shift Summary printed at 0010    Per monitor tech:  Rhythm SB-SR  HR Range 52-63 low of 47  Ectopy R PAC/PVC  Measurements 0.16/0.10/0.44      Normal Values  Rhythm SR  HR Range:   Measurements: 0.12-0.20/0.06-0.10/0.30-0.52

## 2024-11-03 NOTE — CARE PLAN
Problem: Respiratory  Goal: Patient will achieve/maintain optimum respiratory ventilation and gas exchange  Outcome: Progressing     Problem: Communication  Goal: The ability to communicate needs accurately and effectively will improve  Outcome: Progressing   The patient is Stable - Low risk of patient condition declining or worsening    Shift Goals  Clinical Goals: IV antibiotics, ICS, ambulate with assistance, monitor VS  Patient Goals: Rest  Family Goals: VANESSA    Progress made toward(s) clinical / shift goals:  Pt educated on ICS, turning, coughing, and deep breathing.  Belongings within reach.    Patient is not progressing towards the following goals: Encouraged to cough and suction secretions, needs reinforcement.

## 2024-11-03 NOTE — PROGRESS NOTES
Telemetry Shift Summary      Rhythm: SR  HR: 64  Ectopy: n/a     Measurements: 0.14/0.10/0.40     Normal Values  Rhythm: SR  HR:   Measurements: 0.12-0.20/0.08-0.10/0.30-0.52

## 2024-11-03 NOTE — CARE PLAN
The patient is Stable - Low risk of patient condition declining or worsening    Shift Goals  Clinical Goals: IV ABX, monitor O2  Patient Goals: rest  Family Goals: VANESSA    Progress made toward(s) clinical / shift goals:    Problem: Urinary - Renal Perfusion  Goal: Ability to achieve and maintain adequate renal perfusion and functioning will improve  Description: Target End Date:  Prior to discharge or change in level of care    Document on I/O and Assessment flowsheet    1.  Urine output will remain greater than 0.5ml/Kg/HR  2.  Monitor amount and/or characteristics of urine per order/policy. Specific gravity per order/policy  3.  Assess signs and symptoms of renal dysfunction  Outcome: Progressing     Problem: Respiratory  Goal: Patient will achieve/maintain optimum respiratory ventilation and gas exchange  Description: Target End Date:  Prior to discharge or change in level of care    Document on Assessment flowsheet    1.  Assess and monitor rate, rhythm, depth and effort of respiration  2.  Breath sounds assessed qshift and/or as needed  3.  Assess O2 saturation, administer/titrate oxygen as ordered  4.  Position patient for maximum ventilatory efficiency  5.  Turn, cough, and deep breath with splinting to improve effectiveness  6.  Collaborate with RT to administer medication/treatments per order  7.  Encourage use of incentive spirometer and encourage patient to cough after use and utilize splinting techniques if applicable  8.  Airway suctioning  9.  Monitor sputum production for changes in color, consistency and frequency  10. Perform frequent oral hygiene  11. Alternate physical activity with rest periods  Outcome: Progressing     Problem: Skin Integrity  Goal: Skin integrity is maintained or improved  Description: Target End Date:  Prior to discharge or change in level of care    Document interventions on Skin Risk/Edi flowsheet groups and corresponding LDA    1.  Assess and monitor skin integrity,  appearance and/or temperature  2.  Assess risk factors for impaired skin integrity and/or pressures ulcers  3.  Implement precautions to protect skin integrity in collaboration with interdisciplinary team  4.  Implement pressure ulcer prevention protocol if at risk for skin breakdown  5.  Confirm wound care consult if at risk for skin breakdown  6.  Ensure patient use of pressure relieving devices  (Low air loss bed, waffle overlay, heel protectors, ROHO cushion, etc)  Outcome: Progressing     Problem: Fall Risk  Goal: Patient will remain free from falls  Description: Target End Date:  Prior to discharge or change in level of care    Document interventions on the [facility] Fall Risk Assessment    1.  Assess for fall risk factors  2.  Implement fall precautions  Outcome: Progressing     Problem: Pain - Standard  Goal: Alleviation of pain or a reduction in pain to the patient’s comfort goal  Description: Target End Date:  Prior to discharge or change in level of care    Document on Vitals flowsheet    1.  Document pain using the appropriate pain scale per order or unit policy  2.  Educate and implement non-pharmacologic comfort measures (i.e. relaxation, distraction, massage, cold/heat therapy, etc.)  3.  Pain management medications as ordered  4.  Reassess pain after pain med administration per policy  5.  If opiods administered assess patient's response to pain medication is appropriate per POSS sedation scale  6.  Follow pain management plan developed in collaboration with patient and interdisciplinary team (including palliative care or pain specialists if applicable)  Outcome: Progressing       Patient is not progressing towards the following goals:

## 2024-11-03 NOTE — PROGRESS NOTES
Hospital Medicine Daily Progress Note    Date of Service  11/2/2024    Chief Complaint  Austin Woodard is a 74 y.o. male admitted 10/29/2024 with fever, shortness of breath    Hospital Course  No notes on file    Interval Problem Update  11/02:  I discussed with pulmonologist who agreed left lower lobe appears to be a consolidation but has appearances of potential mass, recommended a thoracentesis today.  This was performed removed 800 cc.  Patient apparently fell asleep or had a vasovagal during the procedure.  He was terminated.  I reviewed's pleural fluid cultures, no growth at the moment, no Gram stain findings.  Total protein 3.3, glucose 68, pH 8.  I called daughter Kristine Woodard and provided updates.    11/01:  Patient was still reporting cough.  I checked a viral panel PCR which showed positive for rhinovirus or enterovirus.  Started isolation precautions.  I ordered a CTA chest as D-dimer was elevated at 2.8.  I reviewed the results there is no large PE, indeterminant on peripheral regions.  There is also a moderate to large left pleural effusion, small to moderate right pleural effusion.  Radiologist noting there may be a consolidation or masslike finding on the left lower lobe.    10/31:  Patient appearing lethargic.  He is legally blind.  He is currently living in a group home.  I reviewed labs, hemoglobin 12.5, WBC 7.9, platelets 144.  Sodium 139, potassium 4.2, creatinine 1.1, magnesium 2.1, Phos 2.4.  I ordered a procalcitonin level  Continue Unasyn and azithromycin  Patient lives at group home, we will need PT OT evaluation prior  Echocardiogram pending  Negative for COVID/flu/influenza on admission    I have discussed this patient's plan of care and discharge plan at IDT rounds today with Case Management, Nursing, Nursing leadership, and other members of the IDT team.    Consultants/Specialty  None    Code Status  Full Code    Disposition  The patient is not medically cleared for discharge to home  or a post-acute facility.      I have placed the appropriate orders for post-discharge needs.    Review of Systems  Review of Systems   Constitutional:  Negative for malaise/fatigue.   Eyes:         Chronically blind   Respiratory:  Positive for cough and shortness of breath.    Neurological:  Positive for weakness.   All other systems reviewed and are negative.       Physical Exam  Temp:  [36.4 °C (97.6 °F)-37.4 °C (99.3 °F)] 36.4 °C (97.6 °F)  Pulse:  [57-78] 57  Resp:  [14-26] 20  BP: (117-127)/(66-77) 126/74  SpO2:  [92 %-98 %] 97 %    Physical Exam  Vitals and nursing note reviewed.   Constitutional:       General: He is not in acute distress.     Appearance: He is ill-appearing and toxic-appearing. He is not diaphoretic.   HENT:      Mouth/Throat:      Mouth: Mucous membranes are dry.      Pharynx: No oropharyngeal exudate.   Eyes:      Comments: Bilaterally blind    Cardiovascular:      Rate and Rhythm: Normal rate and regular rhythm.      Pulses: Normal pulses.      Heart sounds: Normal heart sounds. No murmur heard.  Pulmonary:      Effort: Respiratory distress present.      Breath sounds: Rhonchi and rales present. No wheezing.   Abdominal:      General: Bowel sounds are normal. There is no distension.      Tenderness: There is no abdominal tenderness.   Musculoskeletal:         General: No swelling or tenderness. Normal range of motion.      Right lower leg: No edema.      Left lower leg: No edema.   Skin:     General: Skin is warm.      Capillary Refill: Capillary refill takes less than 2 seconds.      Coloration: Skin is not jaundiced or pale.   Neurological:      Mental Status: He is alert and oriented to person, place, and time. Mental status is at baseline.      Motor: Weakness present.   Psychiatric:         Mood and Affect: Mood normal.         Behavior: Behavior normal.         Thought Content: Thought content normal.         Judgment: Judgment normal.         Fluids    Intake/Output Summary (Last  24 hours) at 11/2/2024 1822  Last data filed at 11/2/2024 1136  Gross per 24 hour   Intake 690 ml   Output 600 ml   Net 90 ml        Laboratory  Recent Labs     10/31/24  0025 11/01/24  0119   WBC 7.9 6.1   RBC 4.21* 4.00*   HEMOGLOBIN 12.5* 11.8*   HEMATOCRIT 37.8* 36.6*   MCV 89.8 91.5   MCH 29.7 29.5   MCHC 33.1 32.2*   RDW 55.9* 57.8*   PLATELETCT 144* 123*   MPV 9.2 8.9*     Recent Labs     10/31/24  0025 11/01/24  0119   SODIUM 139 138   POTASSIUM 4.2 3.9   CHLORIDE 105 104   CO2 21 24   GLUCOSE 90 86   BUN 16 18   CREATININE 1.10 1.08   CALCIUM 8.8 8.4                     Imaging  DX-CHEST-LIMITED (1 VIEW)   Final Result         1.  No pneumothorax identified after left-sided thoracentesis.      CT-CTA CHEST PULMONARY ARTERY W/ RECONS   Final Result      1.  No evidence of pulmonary embolism. Cannot exclude segmental and subsegmental pulmonary emboli in the left lower lobe due to perfusion abnormalities.   2.  Moderate/large partially loculated left pleural effusion with adjacent consolidation. Atelectasis versus pneumonia.   3.  Rounded consolidations of the left lower lobe. These may represent infectious or inflammatory consolidations, though are concerning for masses given shape. Recommend follow-up.   4.  Small/moderate right pleural effusion with adjacent right lower lobe atelectasis.   5.  Bilateral gynecomastia.   6.  Borderline ascending aortic ectasia measuring 4.0 cm.            EC-ECHOCARDIOGRAM COMPLETE W/O CONT   Final Result      DX-CHEST-PORTABLE (1 VIEW)   Final Result      1.  Chronic blunting of the left costophrenic angle, pleural thickening and/or small effusion.   2.  Probable small right pleural effusion.   3.  Bibasilar atelectasis and/or edema. Correlate clinically for infection.           Assessment/Plan  * Multifocal pneumonia- (present on admission)  Assessment & Plan  Found to have acute viral pneumonia, and superimposed bacterial pneumonia    Patient had low-grade fever 100.9 F on  10/29  I reviewed chest x-ray, showed left pleural effusion and possible consolidation hidden    I checked a viral panel PCR which showed positive for rhinovirus or enterovirus.  Started isolation precautions.  I ordered a CTA chest as D-dimer was elevated at 2.8.  I reviewed the results there is no large PE, indeterminant on peripheral regions.  There is also a moderate to large left pleural effusion, small to moderate right pleural effusion.  Radiologist noting there may be a consolidation or masslike finding on the left lower lobe (see image below).  Continue IV Unasyn  Status post thoracentesis    Pleural effusion- (present on admission)  Assessment & Plan  Bilateral pleural effusion on CT scan  I discussed with pulmonologist, recommend thoracentesis.  Performed today with 800 cc removed.  I reviewed's pleural fluid cultures, no growth at the moment, no Gram stain findings.  Total protein 3.3, glucose 68, pH 8.  Postprocedure x-ray showed no pneumothorax    First image post thoracentesis, second image from 10/29/24.    Chronic myelocytic leukemia (HCC)- (present on admission)  Assessment & Plan  WBC on admission is 11.9.  Patient is on dasatinib.  Follows with Dr. Valdez  Dasatinib use   increase the risk of infections by 9% - 14%, also associated with bone marrow suppression, anemia and thrombocytopenia   High risk for pleural effusions (10-28%), pericardial effusions  Risk for pulmonary hypertension  Continue to hold home Dasatinib, given current infection with pneumonia.    Discharge planning issues- (present on admission)  Assessment & Plan  Pt lives at [facility]  PT and OT recommended home health.  I placed home health orders, pending center well review on Monday.  Patient was declined from other home health services.    Elevated brain natriuretic peptide (BNP) level- (present on admission)  Assessment & Plan  Patient BNP is elevated.   Due to pleural effusions  I reviewed echocardiogram which  showed a LVEF of 70%, normal diastolic function, enlarged RA, normal LA, normal RV.  Positive for trace mitral regurgitation, no aortic valvulopathy, mild tricuspid regurgitation, RVSP 45 mmHg, trace pulmonic insufficiency, normal pericardium no effusion, aortic root 3.9 cm borderline, ascending aorta 3.8 cm.    ACP (advance care planning)- (present on admission)  Assessment & Plan  Patient is a full code as per daughter    Acute encephalopathy- (present on admission)  Assessment & Plan  The patient is lethargic but easily arousable. Alert and oriented x 3-4 when awake  Likely metabolic likely secondary to hypoxia and pneumonia  Anticipate improvement with correcting/treating pneumonia and hypoxia  Continue treatment for pneumonia and hypoxia    Elevated troponin secondary to demand ischemia (HCC)- (present on admission)  Assessment & Plan  Increased due to demand ischemia after significant bilateral pleural effusions.    Normocytic anemia- (present on admission)  Assessment & Plan  Hemoglobin 12.5, platelets 144  Monitoring    Hypoxia- (present on admission)  Assessment & Plan  Hypoxia due to acute viral pneumonia, acute bacterial community-acquired pneumonia, acute bilateral pleural effusions    SALENA (obstructive sleep apnea)- (present on admission)  Assessment & Plan  Continue CPAP    CPAP (continuous positive airway pressure) dependence- (present on admission)  Assessment & Plan  Continue CPAP    Thrombocytopenia (HCC)- (present on admission)  Assessment & Plan  -Platelets are 130.  Will monitor CBC in the morning.    Platelets 123, monitoring.  No transfusions at this time    Bipolar 1 disorder (HCC)- (present on admission)  Assessment & Plan  Continue Abilify    Benign prostatic hyperplasia with lower urinary tract symptoms- (present on admission)  Assessment & Plan  Continue tamsulosin    Primary open angle glaucoma- (present on admission)  Assessment & Plan  continue glaucoma medications    Impaired decision  making- (present on admission)  Assessment & Plan  [facility] Eliz Hawk [phone]  Dtr Kristine Woodard Cell: [phone]          VTE prophylaxis:    enoxaparin ppx      I have performed a physical exam and reviewed and updated ROS and Plan today (11/2/2024). In review of yesterday's note (11/1/2024), there are no changes except as documented above.      Total time spent 53 minutes.    This included my review of patient's overnight RN notes, face to face interview, physical examination, lab analysis.  Also includes repeat visits with the patient, and my documented assessments and interventions above.  I have spoken with specialist from pulmonology.  In addition, I spoke with entire care team on patient's treatment plan, and DC planning on morning rounds and IDT rounds.    This note was generated using voice recognition software which has a chance of producing errors of grammar and content.  I have made every reasonable attempt to find and correct any errors, but it should be expected that some may not be found prior to finalization of this note.

## 2024-11-03 NOTE — ASSESSMENT & PLAN NOTE
Bilateral pleural effusion on CT scan  I discussed with pulmonologist, recommend thoracentesis.  Performed today with 800 cc removed.  I reviewed's pleural fluid cultures, no growth at the moment, no Gram stain findings.  Total protein 3.3, glucose 68, pH 8.  Postprocedure x-ray showed no pneumothorax    Due to Dasatinib. Concern for malignancy if symptoms do not improve on antibiotics.  Pleura fluid showed total nucleated cells 1009, 94% lymphocytes. Does not meet light's criteria, indicating Transudative effusion.  Labs pending: culture (Fluid, fungal, anaerobic, AFB), Cytology, flow cytometry.   11/4:  I reviewed chest x-rays, first image 11/3, than 11/2, 10/29, 8/5/2022.  Persistent left pleural effusion.  Pleural fluid culture no growth to date  Continue IV Lasix  Continue supportive care for rhinovirus viral pneumonia    11/5:  Pleural effusions are likely to remain acute on chronic.  Continue lasix, fluid restrictions.

## 2024-11-04 LAB — CYTOLOGY REG CYTOL: NORMAL

## 2024-11-04 PROCEDURE — A9270 NON-COVERED ITEM OR SERVICE: HCPCS | Performed by: HOSPITALIST

## 2024-11-04 PROCEDURE — 700105 HCHG RX REV CODE 258: Performed by: INTERNAL MEDICINE

## 2024-11-04 PROCEDURE — 700111 HCHG RX REV CODE 636 W/ 250 OVERRIDE (IP): Performed by: INTERNAL MEDICINE

## 2024-11-04 PROCEDURE — 94760 N-INVAS EAR/PLS OXIMETRY 1: CPT

## 2024-11-04 PROCEDURE — 94660 CPAP INITIATION&MGMT: CPT

## 2024-11-04 PROCEDURE — 99233 SBSQ HOSP IP/OBS HIGH 50: CPT | Performed by: INTERNAL MEDICINE

## 2024-11-04 PROCEDURE — 700111 HCHG RX REV CODE 636 W/ 250 OVERRIDE (IP): Mod: JZ | Performed by: HOSPITALIST

## 2024-11-04 PROCEDURE — 770020 HCHG ROOM/CARE - TELE (206)

## 2024-11-04 PROCEDURE — 700111 HCHG RX REV CODE 636 W/ 250 OVERRIDE (IP): Mod: JZ | Performed by: INTERNAL MEDICINE

## 2024-11-04 PROCEDURE — 700102 HCHG RX REV CODE 250 W/ 637 OVERRIDE(OP): Performed by: HOSPITALIST

## 2024-11-04 RX ADMIN — ARIPIPRAZOLE 10 MG: 10 TABLET ORAL at 05:23

## 2024-11-04 RX ADMIN — FUROSEMIDE 40 MG: 10 INJECTION, SOLUTION INTRAMUSCULAR; INTRAVENOUS at 05:23

## 2024-11-04 RX ADMIN — ENOXAPARIN SODIUM 40 MG: 100 INJECTION SUBCUTANEOUS at 17:01

## 2024-11-04 RX ADMIN — ASPIRIN 81 MG 81 MG: 81 TABLET ORAL at 05:23

## 2024-11-04 RX ADMIN — AMPICILLIN AND SULBACTAM 3 G: 1; 2 INJECTION, POWDER, FOR SOLUTION INTRAMUSCULAR; INTRAVENOUS at 22:09

## 2024-11-04 RX ADMIN — AMPICILLIN AND SULBACTAM 3 G: 1; 2 INJECTION, POWDER, FOR SOLUTION INTRAMUSCULAR; INTRAVENOUS at 11:39

## 2024-11-04 RX ADMIN — Medication 500 MG: at 17:01

## 2024-11-04 RX ADMIN — DORZOLAMIDE HYDROCHLORIDE AND TIMOLOL MALEATE 1 DROP: 20; 5 SOLUTION/ DROPS OPHTHALMIC at 05:23

## 2024-11-04 RX ADMIN — AMPICILLIN AND SULBACTAM 3 G: 1; 2 INJECTION, POWDER, FOR SOLUTION INTRAMUSCULAR; INTRAVENOUS at 05:23

## 2024-11-04 RX ADMIN — LATANOPROST 1 DROP: 50 SOLUTION OPHTHALMIC at 20:12

## 2024-11-04 RX ADMIN — TAMSULOSIN HYDROCHLORIDE 0.4 MG: 0.4 CAPSULE ORAL at 09:21

## 2024-11-04 RX ADMIN — AMPICILLIN AND SULBACTAM 3 G: 1; 2 INJECTION, POWDER, FOR SOLUTION INTRAMUSCULAR; INTRAVENOUS at 17:03

## 2024-11-04 RX ADMIN — DORZOLAMIDE HYDROCHLORIDE AND TIMOLOL MALEATE 1 DROP: 20; 5 SOLUTION/ DROPS OPHTHALMIC at 17:01

## 2024-11-04 ASSESSMENT — ENCOUNTER SYMPTOMS
COUGH: 1
SHORTNESS OF BREATH: 1
WEAKNESS: 1

## 2024-11-04 NOTE — PROGRESS NOTES
Telemetry Shift Summary      Rhythm: SR  HR: 64  Ectopy: n/a     Measurements: 0.16/0.08/0.38     Normal Values  Rhythm: SR  HR:   Measurements: 0.12-0.20/0.08-0.10/0.30-0.52

## 2024-11-04 NOTE — DISCHARGE PLANNING
Case Management Discharge Planning    Admission Date: 10/29/2024  GMLOS: 4  ALOS: 6    6-Clicks ADL Score: 13  6-Clicks Mobility Score: 15    Anticipated Discharge Dispo: Discharge Disposition: D/T to home under [facility]HA care in anticipation of covered skilled care (06)    Action(s) Taken: Referral(s) sent    @0944  Referral sent to [facility] for review. Patient declined by [facility].     @4470  [facility] declined. Referral sent to [facility].     @0418  Received message from [facility] reporting they're accepting patient's home health referral.

## 2024-11-04 NOTE — CARE PLAN
The patient is Stable - Low risk of patient condition declining or worsening    Shift Goals  Clinical Goals: Wean oxygen  Patient Goals: Rest  Family Goals: VANESSA    Progress made toward(s) clinical / shift goals:    Problem: Knowledge Deficit - Standard  Goal: Patient and family/care givers will demonstrate understanding of plan of care, disease process/condition, diagnostic tests and medications  Outcome: Progressing  Note: Patient's knowledge of plan of care, diagnostics, and medications regularly assessed. RN answers patient questions appropriately, education provided. Patient verbalizes better understanding of their condition.       Problem: Respiratory  Goal: Patient will achieve/maintain optimum respiratory ventilation and gas exchange  Outcome: Progressing  Flowsheets  Taken 11/4/2024 0919  O2 Delivery Device: Room air w/o2 available  Taken 11/4/2024 0900  Deep Breathe and Cough: Performs Correctly  Note: Weaning oxygen as able.       Patient is not progressing towards the following goals:

## 2024-11-04 NOTE — PROGRESS NOTES
Patient with ongoing lethargy today. Declines wanting to eat lunch at this time or exercise in bed for oxygen study, patient simply rolls to his side to continue sleeping. Therapeutic environment maintained.

## 2024-11-04 NOTE — FACE TO FACE
"Face to Face Note  -  Durable Medical Equipment    Dereck Rajput M.D. - NPI: [id number]  I certify that this patient is under my care and that they had a durable medical equipment(DME)face to face encounter by myself that meets the physician DME face-to-face encounter requirements with this patient on:    Date of encounter:   Patient:                    MRN:                       YOB: 2023  Austin Woodard  [id number]  1950     The encounter with the patient was in whole, or in part, for the following medical condition, which is the primary reason for durable medical equipment:  Other - acute hypoxemic respiratory failure, acute on chronic bilateral pleural effusions, acute pulmonary edema, multifocal pneumonia, CLL, Obesity class 1, SALENA, nocturnal CPAP.    I certify that, based on my findings, the following durable medical equipment is medically necessary:    Oxygen   HOME O2 Saturation Measurements:(Values must be present for Home Oxygen orders)  Room air sat at rest: 88     With liters of O2: 2, O2 sat at rest with O2: 94   ,    Is the patient mobile?: No  If patient feels more short of breath, they can go up to 6 liters per minute and contact healthcare provider.    Supporting Symptoms: The patient requires supplemental oxygen, as the following interventions have been tried with limited or no improvement: \"Bronchodilators and/or steroid inhalers, \"Positive expiratory pressure therapies, \"Oral and/or IV steroids, \"Ambulation with oximetry, and \"Incentive spirometry.    My Clinical findings support the need for the above equipment due to:  Hypoxia, Other - acute hypoxemic respiratory failure, acute on chronic bilateral pleural effusions, acute pulmonary edema, multifocal pneumonia, CLL, Obesity class 1, SALENA, nocturnal CPAP  "

## 2024-11-04 NOTE — PROGRESS NOTES
Critical Care Progress Note    Date of admission  10/29/2024    Chief Complaint/Hospital Course  74 y.o. male with history of CML on immunotherapy, hypertension, depression, glaucoma and bipolar disorder/depression, disability and lives in group home for care with his ADLs who presented 10/29/2024 with dyspnea, cough and fever. He was noted to be positive for rhinovirus/enterovirus. CTPA showed b/l effusions and rounded consolidations of left lower lobe c/f infection or possible malignancy.       Review of Systems  Review of Systems   Unable to perform ROS: Medical condition        Vital Signs for last 24 hours   Temp:  [36 °C (96.8 °F)-37.1 °C (98.7 °F)] 37.1 °C (98.7 °F)  Pulse:  [56-65] 65  Resp:  [16-30] 18  BP: (112-146)/(55-92) 146/77  SpO2:  [92 %-97 %] 96 %    Hemodynamic parameters for last 24 hours       Respiratory Information for the last 24 hours       Physical Exam   Physical Exam  Constitutional:       General: He is not in acute distress.  HENT:      Mouth/Throat:      Mouth: Mucous membranes are moist.   Cardiovascular:      Rate and Rhythm: Normal rate and regular rhythm.   Pulmonary:      Effort: Pulmonary effort is normal. No respiratory distress.   Abdominal:      Palpations: Abdomen is soft.   Musculoskeletal:         General: No deformity.   Skin:     General: Skin is warm and dry.   Neurological:      Comments: Able to answer yes/no and simple questions and moves all extremities. Does not have capacity to consent or make complex medical decisions         Medications  Current Facility-Administered Medications   Medication Dose Route Frequency Provider Last Rate Last Admin    furosemide (Lasix) injection 40 mg  40 mg Intravenous Q DAY Dereck Rajput M.D.        Respiratory Therapy Consult   Nebulization Continuous RT Mallika March M.D.        enoxaparin (Lovenox) inj 40 mg  40 mg Subcutaneous DAILY AT 1800 Rachael Bishop M.D.   40 mg at 11/02/24 1727    ARIPiprazole (Abilify)  tablet 10 mg  10 mg Oral DAILY Mallika March M.D.   10 mg at 11/03/24 0508    aspirin (Asa) chewable tab 81 mg  81 mg Oral DAILY Mallika March M.D.   81 mg at 11/03/24 0508    benzonatate (Tessalon) capsule 100 mg  100 mg Oral TID PRN Mallika March M.D.        dorzolamide-timolol (Cosopt) 2-0.5 % ophthalmic drops 1 Drop  1 Drop Right Eye BID Mallika March M.D.   1 Drop at 11/03/24 0508    latanoprost (Xalatan) 0.005 % ophthalmic solution 1 Drop  1 Drop Right Eye Nightly Mallika March M.D.   1 Drop at 11/02/24 2030    niacin tablet 500 mg  500 mg Oral Q EVENING Mallika March M.D.   500 mg at 11/02/24 1727    tamsulosin (Flomax) capsule 0.4 mg  0.4 mg Oral AFTER BREAKFAST Mallika March M.D.   0.4 mg at 11/03/24 0840    acetaminophen (Tylenol) tablet 650 mg  650 mg Oral Q6HRS PRN Mallika March M.D.   650 mg at 11/02/24 1727    [Held by provider] senna-docusate (Pericolace Or Senokot S) 8.6-50 MG per tablet 2 Tablet  2 Tablet Oral BID Mallika March M.D.   2 Tablet at 10/30/24 0525    And    [Held by provider] polyethylene glycol/lytes (Miralax) Packet 1 Packet  1 Packet Oral QDAY PRN Mallika March M.D.        ondansetron (Zofran) syringe/vial injection 4 mg  4 mg Intravenous Q4HRS PRN Mallika March M.D.        ondansetron (Zofran ODT) dispertab 4 mg  4 mg Oral Q4HRS PRN Mallika March M.D.        ampicillin/sulbactam (Unasyn) 3 g in  mL IVPB  3 g Intravenous Q6HR Dereck Rajput M.D.   Stopped at 11/03/24 1303       Fluids    Intake/Output Summary (Last 24 hours) at 11/3/2024 1743  Last data filed at 11/3/2024 1303  Gross per 24 hour   Intake 320 ml   Output 1600 ml   Net -1280 ml       Laboratory          Recent Labs     11/01/24  0119 11/03/24  0341   SODIUM 138 137   POTASSIUM 3.9 4.0   CHLORIDE 104 105   CO2 24 20   BUN 18 19   CREATININE 1.08 1.02   MAGNESIUM 2.2 2.1   PHOSPHORUS 3.1 2.6    CALCIUM 8.4 8.5     Recent Labs     11/01/24  0119 11/03/24  0341   GLUCOSE 86 91     Recent Labs     11/01/24  0119 11/03/24  0341   WBC 6.1 5.3     Recent Labs     11/01/24  0119 11/03/24  0341   RBC 4.00* 4.09*   HEMOGLOBIN 11.8* 12.2*   HEMATOCRIT 36.6* 37.6*   PLATELETCT 123* 145*       Imaging  Reviewed     Assessment/Plan    Bilateral pleural effusions  Left lower lobe consolidations  On CXR in 12/2023, had this opacity in the LLL and c/f effusion at that time as well so wonder if this is same process worsening and c/f malignancy or these are separate infectious processes.   S/p L thoracentesis 11/2 w/800cc fluid removed - studies c/w transudative effusion. Cell count with lymphocyte predominance which can be seen w/malignancy. Dasatanib can also be the cause of the pleural effusions.  - f/u remaining pleural fluid studies and cytology   - IPV if able to participate  - supportive care for rhino/enterovirus  - needs repeat CT chest in 4-6 weeks outpatient to follow these consolidations to resolution and discuss further diagnostic options if they don't resolve          Charlotte Graham MD  Pulmonary and Critical Care Medicine  [facility]

## 2024-11-04 NOTE — PROGRESS NOTES
Hospital Medicine Daily Progress Note    Date of Service  11/3/2024    Chief Complaint  Austin Woodard is a 74 y.o. male admitted 10/29/2024 with fever, shortness of breath    Hospital Course  No notes on file    Interval Problem Update  11/03:   Patient denied any pain to sight of thoracentesis.  I worked with him to see his effort with IS, he was inhaling only between 250-500mL.   I discussed with pulmonologist, continue lasix. Consider outpatient PET CT chest scan with oncologist.  I called daughter for updates.  Labs pending: culture (Fluid, fungal, anaerobic, AFB), Cytology, flow cytometry.     11/02:  I discussed with pulmonologist who agreed left lower lobe appears to be a consolidation but has appearances of potential mass, recommended a thoracentesis today.  This was performed removed 800 cc.  Patient apparently fell asleep or had a vasovagal during the procedure.  He was terminated.  I reviewed's pleural fluid cultures, no growth at the moment, no Gram stain findings.  Total protein 3.3, glucose 68, pH 8.  I called daughter Kristine Woodard and provided updates.    11/01:  Patient was still reporting cough.  I checked a viral panel PCR which showed positive for rhinovirus or enterovirus.  Started isolation precautions.  I ordered a CTA chest as D-dimer was elevated at 2.8.  I reviewed the results there is no large PE, indeterminant on peripheral regions.  There is also a moderate to large left pleural effusion, small to moderate right pleural effusion.  Radiologist noting there may be a consolidation or masslike finding on the left lower lobe.    10/31:  Patient appearing lethargic.  He is legally blind.  He is currently living in a group home.  I reviewed labs, hemoglobin 12.5, WBC 7.9, platelets 144.  Sodium 139, potassium 4.2, creatinine 1.1, magnesium 2.1, Phos 2.4.  I ordered a procalcitonin level  Continue Unasyn and azithromycin  Patient lives at group home, we will need PT OT evaluation  prior  Echocardiogram pending  Negative for COVID/flu/influenza on admission    I have discussed this patient's plan of care and discharge plan at IDT rounds today with Case Management, Nursing, Nursing leadership, and other members of the IDT team.    Consultants/Specialty  None    Code Status  Full Code    Disposition  The patient is not medically cleared for discharge to home or a post-acute facility.      I have placed the appropriate orders for post-discharge needs.    Review of Systems  Review of Systems   Constitutional:  Negative for malaise/fatigue.   Eyes:         Chronically blind   Respiratory:  Positive for cough and shortness of breath.    Neurological:  Positive for weakness.   All other systems reviewed and are negative.       Physical Exam  Temp:  [36 °C (96.8 °F)-37.1 °C (98.7 °F)] 37.1 °C (98.7 °F)  Pulse:  [56-65] 65  Resp:  [16-30] 18  BP: (112-146)/(55-92) 146/77  SpO2:  [92 %-97 %] 96 %    Physical Exam  Vitals and nursing note reviewed.   Constitutional:       General: He is not in acute distress.     Appearance: He is ill-appearing. He is not toxic-appearing or diaphoretic.   HENT:      Mouth/Throat:      Mouth: Mucous membranes are dry.      Pharynx: No oropharyngeal exudate.   Eyes:      Comments: Bilaterally blind    Cardiovascular:      Rate and Rhythm: Normal rate and regular rhythm.      Pulses: Normal pulses.      Heart sounds: Normal heart sounds. No murmur heard.  Pulmonary:      Effort: Respiratory distress present.      Breath sounds: Rhonchi and rales present. No wheezing.   Abdominal:      General: Bowel sounds are normal. There is no distension.      Tenderness: There is no abdominal tenderness.   Musculoskeletal:         General: No swelling or tenderness. Normal range of motion.      Right lower leg: No edema.      Left lower leg: No edema.   Skin:     General: Skin is warm.      Capillary Refill: Capillary refill takes less than 2 seconds.      Coloration: Skin is not  jaundiced or pale.   Neurological:      Mental Status: He is alert. Mental status is at baseline.      Motor: Weakness present.   Psychiatric:         Mood and Affect: Mood normal.         Behavior: Behavior normal.         Thought Content: Thought content normal.         Judgment: Judgment normal.         Fluids    Intake/Output Summary (Last 24 hours) at 11/3/2024 1625  Last data filed at 11/3/2024 0900  Gross per 24 hour   Intake 320 ml   Output 1200 ml   Net -880 ml        Laboratory  Recent Labs     11/01/24  0119 11/03/24  0341   WBC 6.1 5.3   RBC 4.00* 4.09*   HEMOGLOBIN 11.8* 12.2*   HEMATOCRIT 36.6* 37.6*   MCV 91.5 91.9   MCH 29.5 29.8   MCHC 32.2* 32.4   RDW 57.8* 55.2*   PLATELETCT 123* 145*   MPV 8.9* 9.3     Recent Labs     11/01/24 0119 11/03/24 0341   SODIUM 138 137   POTASSIUM 3.9 4.0   CHLORIDE 104 105   CO2 24 20   GLUCOSE 86 91   BUN 18 19   CREATININE 1.08 1.02   CALCIUM 8.4 8.5                     Imaging  DX-CHEST-LIMITED (1 VIEW)   Final Result         1.  No pneumothorax identified after left-sided thoracentesis.      CT-CTA CHEST PULMONARY ARTERY W/ RECONS   Final Result      1.  No evidence of pulmonary embolism. Cannot exclude segmental and subsegmental pulmonary emboli in the left lower lobe due to perfusion abnormalities.   2.  Moderate/large partially loculated left pleural effusion with adjacent consolidation. Atelectasis versus pneumonia.   3.  Rounded consolidations of the left lower lobe. These may represent infectious or inflammatory consolidations, though are concerning for masses given shape. Recommend follow-up.   4.  Small/moderate right pleural effusion with adjacent right lower lobe atelectasis.   5.  Bilateral gynecomastia.   6.  Borderline ascending aortic ectasia measuring 4.0 cm.            EC-ECHOCARDIOGRAM COMPLETE W/O CONT   Final Result      DX-CHEST-PORTABLE (1 VIEW)   Final Result      1.  Chronic blunting of the left costophrenic angle, pleural thickening and/or  small effusion.   2.  Probable small right pleural effusion.   3.  Bibasilar atelectasis and/or edema. Correlate clinically for infection.      DX-CHEST-PORTABLE (1 VIEW)    (Results Pending)        Assessment/Plan  * Multifocal pneumonia- (present on admission)  Assessment & Plan  Found to have acute viral pneumonia, and superimposed bacterial pneumonia    Patient had low-grade fever 100.9 F on 10/29  I reviewed chest x-ray, showed left pleural effusion and possible consolidation hidden    I checked a viral panel PCR which showed positive for rhinovirus or enterovirus.  Started isolation precautions.  I ordered a CTA chest as D-dimer was elevated at 2.8.  I reviewed the results there is no large PE, indeterminant on peripheral regions.  There is also a moderate to large left pleural effusion, small to moderate right pleural effusion.  Radiologist noting there may be a consolidation or masslike finding on the left lower lobe (see image below).  Continue IV Unasyn  Status post thoracentesis    Pleural effusion- (present on admission)  Assessment & Plan  Bilateral pleural effusion on CT scan  I discussed with pulmonologist, recommend thoracentesis.  Performed today with 800 cc removed.  I reviewed's pleural fluid cultures, no growth at the moment, no Gram stain findings.  Total protein 3.3, glucose 68, pH 8.  Postprocedure x-ray showed no pneumothorax    First image post thoracentesis, second image from 10/29/24.  Likely due to Dasatinib. Concern for malignancy if symptoms do not improve on antibiotics.  I ordered repeat CXR.  I am continuing IV Lasix.  Pleura fluid showed total nucleated cells 1009, 94% lymphocytes. Does not meet light's criteria, indicating Transudative effusion.  Labs pending: culture (Fluid, fungal, anaerobic, AFB), Cytology, flow cytometry.     Chronic myelocytic leukemia (HCC)- (present on admission)  Assessment & Plan  WBC on admission is 11.9.  Patient is on dasatinib.  Follows with   José Miguel  Dasatinib use   increase the risk of infections by 9% - 14%, also associated with bone marrow suppression, anemia and thrombocytopenia   High risk for pleural effusions (10-28%), pericardial effusions  Risk for pulmonary hypertension  Continue to hold home Dasatinib, given current infection with pneumonia.  Needs PET scan of chest outpatient  Dr. Valdez appointment 11/6/24.    Discharge planning issues- (present on admission)  Assessment & Plan  Pt lives at [facility]  PT and OT recommended home health.  I placed home health orders, pending center well review on Monday.  Patient was declined from other home health services.    Elevated brain natriuretic peptide (BNP) level- (present on admission)  Assessment & Plan  Patient BNP is elevated.   Due to pleural effusions  I reviewed echocardiogram which showed a LVEF of 70%, normal diastolic function, enlarged RA, normal LA, normal RV.  Positive for trace mitral regurgitation, no aortic valvulopathy, mild tricuspid regurgitation, RVSP 45 mmHg, trace pulmonic insufficiency, normal pericardium no effusion, aortic root 3.9 cm borderline, ascending aorta 3.8 cm.    ACP (advance care planning)- (present on admission)  Assessment & Plan  Patient is a full code as per daughter    Acute encephalopathy- (present on admission)  Assessment & Plan  The patient is lethargic but easily arousable. Alert and oriented x 3-4 when awake  Likely metabolic likely secondary to hypoxia and pneumonia  Anticipate improvement with correcting/treating pneumonia and hypoxia  Continue treatment for pneumonia and hypoxia    Elevated troponin secondary to demand ischemia (HCC)- (present on admission)  Assessment & Plan  Increased due to demand ischemia after significant bilateral pleural effusions.    Normocytic anemia- (present on admission)  Assessment & Plan  Hemoglobin 12.5, platelets 144  Monitoring    Hypoxia- (present on admission)  Assessment & Plan  Hypoxia due to acute  viral pneumonia, acute bacterial community-acquired pneumonia, acute bilateral pleural effusions    SALENA (obstructive sleep apnea)- (present on admission)  Assessment & Plan  Continue CPAP    CPAP (continuous positive airway pressure) dependence- (present on admission)  Assessment & Plan  Continue CPAP    Thrombocytopenia (HCC)- (present on admission)  Assessment & Plan  -Platelets are 130.  Will monitor CBC in the morning.    Platelets 123, monitoring.  No transfusions at this time    Bipolar 1 disorder (HCC)- (present on admission)  Assessment & Plan  Continue Abilify    Benign prostatic hyperplasia with lower urinary tract symptoms- (present on admission)  Assessment & Plan  Continue tamsulosin    Primary open angle glaucoma- (present on admission)  Assessment & Plan  continue glaucoma medications    Impaired decision making- (present on admission)  Assessment & Plan  [facility] Eliz Hawk [phone]  Dtr Kristine Woodard Cell: [phone]          VTE prophylaxis:    enoxaparin ppx      I have performed a physical exam and reviewed and updated ROS and Plan today (11/3/2024). In review of yesterday's note (11/2/2024), there are no changes except as documented above.      Total time spent 55 minutes.    This included my review of patient's overnight RN notes, face to face interview, physical examination, lab analysis.  Also includes repeat visits with the patient, and my documented assessments and interventions above.  I have spoken with specialist from pulmonologist.  In addition, I spoke with entire care team on patient's treatment plan, and DC planning on morning rounds and IDT rounds.    This note was generated using voice recognition software which has a chance of producing errors of grammar and content.  I have made every reasonable attempt to find and correct any errors, but it should be expected that some may not be found prior to finalization of this note.

## 2024-11-04 NOTE — CARE PLAN
The patient is Watcher - Medium risk of patient condition declining or worsening    Shift Goals  Clinical Goals: IV ABx, Isolation, IS  Patient Goals: Comfort  Family Goals: VANESSA    Progress made toward(s) clinical / shift goals:    Problem: Knowledge Deficit - Standard  Goal: Patient and family/care givers will demonstrate understanding of plan of care, disease process/condition, diagnostic tests and medications  Outcome: Progressing     Problem: Hemodynamics  Goal: Patient's hemodynamics, fluid balance and neurologic status will be stable or improve  Outcome: Progressing     Problem: Fluid Volume  Goal: Fluid volume balance will be maintained  Outcome: Progressing     Problem: Respiratory  Goal: Patient will achieve/maintain optimum respiratory ventilation and gas exchange  Outcome: Progressing       Patient is not progressing towards the following goals:

## 2024-11-05 PROBLEM — J96.21 ACUTE ON CHRONIC RESPIRATORY FAILURE WITH HYPOXIA (HCC): Status: ACTIVE | Noted: 2024-11-05

## 2024-11-05 LAB
ACUTE LEUKEMIA MARKERS SPEC-IMP: NORMAL
ALBUMIN SERPL BCP-MCNC: 3.3 G/DL (ref 3.2–4.9)
BACTERIA FLD AEROBE CULT: NORMAL
BUN SERPL-MCNC: 16 MG/DL (ref 8–22)
CALCIUM ALBUM COR SERPL-MCNC: 9.2 MG/DL (ref 8.5–10.5)
CALCIUM SERPL-MCNC: 8.6 MG/DL (ref 8.4–10.2)
CHLORIDE SERPL-SCNC: 102 MMOL/L (ref 96–112)
CO2 SERPL-SCNC: 26 MMOL/L (ref 20–33)
CREAT SERPL-MCNC: 0.99 MG/DL (ref 0.5–1.4)
ERYTHROCYTE [DISTWIDTH] IN BLOOD BY AUTOMATED COUNT: 51.6 FL (ref 35.9–50)
EVENTS COUNTED SPEC: 26 MARKERS
GFR SERPLBLD CREATININE-BSD FMLA CKD-EPI: 80 ML/MIN/1.73 M 2
GLUCOSE SERPL-MCNC: 86 MG/DL (ref 65–99)
GRAM STN SPEC: NORMAL
HCT VFR BLD AUTO: 35.9 % (ref 42–52)
HGB BLD-MCNC: 12.1 G/DL (ref 14–18)
MAGNESIUM SERPL-MCNC: 2.1 MG/DL (ref 1.5–2.5)
MCH RBC QN AUTO: 29.6 PG (ref 27–33)
MCHC RBC AUTO-ENTMCNC: 33.7 G/DL (ref 32.3–36.5)
MCV RBC AUTO: 87.8 FL (ref 81.4–97.8)
PHOSPHATE SERPL-MCNC: 2.4 MG/DL (ref 2.5–4.5)
PLATELET # BLD AUTO: 171 K/UL (ref 164–446)
PMV BLD AUTO: 8.8 FL (ref 9–12.9)
POTASSIUM SERPL-SCNC: 3.5 MMOL/L (ref 3.6–5.5)
PROCALCITONIN SERPL-MCNC: 0.09 NG/ML
RBC # BLD AUTO: 4.09 M/UL (ref 4.7–6.1)
SIGNIFICANT IND 70042: NORMAL
SITE SITE: NORMAL
SODIUM SERPL-SCNC: 139 MMOL/L (ref 135–145)
SOURCE 9121: NORMAL
SOURCE SOURCE: NORMAL
WBC # BLD AUTO: 6.8 K/UL (ref 4.8–10.8)

## 2024-11-05 PROCEDURE — A9270 NON-COVERED ITEM OR SERVICE: HCPCS | Performed by: HOSPITALIST

## 2024-11-05 PROCEDURE — 700102 HCHG RX REV CODE 250 W/ 637 OVERRIDE(OP): Performed by: INTERNAL MEDICINE

## 2024-11-05 PROCEDURE — 770020 HCHG ROOM/CARE - TELE (206)

## 2024-11-05 PROCEDURE — 99233 SBSQ HOSP IP/OBS HIGH 50: CPT | Performed by: INTERNAL MEDICINE

## 2024-11-05 PROCEDURE — 94760 N-INVAS EAR/PLS OXIMETRY 1: CPT

## 2024-11-05 PROCEDURE — 94640 AIRWAY INHALATION TREATMENT: CPT

## 2024-11-05 PROCEDURE — 83735 ASSAY OF MAGNESIUM: CPT

## 2024-11-05 PROCEDURE — 700111 HCHG RX REV CODE 636 W/ 250 OVERRIDE (IP): Mod: JZ | Performed by: INTERNAL MEDICINE

## 2024-11-05 PROCEDURE — RXMED WILLOW AMBULATORY MEDICATION CHARGE: Performed by: INTERNAL MEDICINE

## 2024-11-05 PROCEDURE — 80069 RENAL FUNCTION PANEL: CPT

## 2024-11-05 PROCEDURE — 36415 COLL VENOUS BLD VENIPUNCTURE: CPT

## 2024-11-05 PROCEDURE — 94660 CPAP INITIATION&MGMT: CPT

## 2024-11-05 PROCEDURE — A9270 NON-COVERED ITEM OR SERVICE: HCPCS | Performed by: INTERNAL MEDICINE

## 2024-11-05 PROCEDURE — 700101 HCHG RX REV CODE 250: Performed by: INTERNAL MEDICINE

## 2024-11-05 PROCEDURE — 85027 COMPLETE CBC AUTOMATED: CPT

## 2024-11-05 PROCEDURE — 700111 HCHG RX REV CODE 636 W/ 250 OVERRIDE (IP): Mod: JZ | Performed by: HOSPITALIST

## 2024-11-05 PROCEDURE — 84145 PROCALCITONIN (PCT): CPT

## 2024-11-05 PROCEDURE — 700102 HCHG RX REV CODE 250 W/ 637 OVERRIDE(OP): Performed by: HOSPITALIST

## 2024-11-05 RX ORDER — LACTULOSE 10 G/15ML
15 SOLUTION ORAL 2 TIMES DAILY
Status: DISCONTINUED | OUTPATIENT
Start: 2024-11-05 | End: 2024-11-06 | Stop reason: HOSPADM

## 2024-11-05 RX ORDER — ALBUTEROL SULFATE 90 UG/1
2 INHALANT RESPIRATORY (INHALATION)
Status: DISCONTINUED | OUTPATIENT
Start: 2024-11-05 | End: 2024-11-05

## 2024-11-05 RX ORDER — FUROSEMIDE 10 MG/ML
40 INJECTION INTRAMUSCULAR; INTRAVENOUS
Status: DISCONTINUED | OUTPATIENT
Start: 2024-11-06 | End: 2024-11-06 | Stop reason: HOSPADM

## 2024-11-05 RX ORDER — IPRATROPIUM BROMIDE AND ALBUTEROL SULFATE 2.5; .5 MG/3ML; MG/3ML
3 SOLUTION RESPIRATORY (INHALATION) EVERY 6 HOURS PRN
Qty: 360 ML | Refills: 5 | Status: SHIPPED | OUTPATIENT
Start: 2024-11-05 | End: 2024-12-06

## 2024-11-05 RX ORDER — BISACODYL 10 MG
10 SUPPOSITORY, RECTAL RECTAL
Status: DISCONTINUED | OUTPATIENT
Start: 2024-11-05 | End: 2024-11-06 | Stop reason: HOSPADM

## 2024-11-05 RX ORDER — METHYLPREDNISOLONE SODIUM SUCCINATE 40 MG/ML
40 INJECTION, POWDER, LYOPHILIZED, FOR SOLUTION INTRAMUSCULAR; INTRAVENOUS EVERY 6 HOURS
Status: COMPLETED | OUTPATIENT
Start: 2024-11-05 | End: 2024-11-06

## 2024-11-05 RX ORDER — ALBUTEROL SULFATE 90 UG/1
2 INHALANT RESPIRATORY (INHALATION) EVERY 4 HOURS
Status: DISCONTINUED | OUTPATIENT
Start: 2024-11-05 | End: 2024-11-05

## 2024-11-05 RX ORDER — IPRATROPIUM BROMIDE AND ALBUTEROL SULFATE 2.5; .5 MG/3ML; MG/3ML
3 SOLUTION RESPIRATORY (INHALATION)
Status: DISCONTINUED | OUTPATIENT
Start: 2024-11-05 | End: 2024-11-06 | Stop reason: HOSPADM

## 2024-11-05 RX ADMIN — SENNOSIDES AND DOCUSATE SODIUM 2 TABLET: 50; 8.6 TABLET ORAL at 18:07

## 2024-11-05 RX ADMIN — TAMSULOSIN HYDROCHLORIDE 0.4 MG: 0.4 CAPSULE ORAL at 09:10

## 2024-11-05 RX ADMIN — LATANOPROST 1 DROP: 50 SOLUTION OPHTHALMIC at 20:25

## 2024-11-05 RX ADMIN — ASPIRIN 81 MG 81 MG: 81 TABLET ORAL at 05:39

## 2024-11-05 RX ADMIN — ENOXAPARIN SODIUM 40 MG: 100 INJECTION SUBCUTANEOUS at 18:08

## 2024-11-05 RX ADMIN — LACTULOSE 15 ML: 20 SOLUTION ORAL at 18:07

## 2024-11-05 RX ADMIN — ALBUTEROL SULFATE 2 PUFF: 90 AEROSOL, METERED RESPIRATORY (INHALATION) at 12:00

## 2024-11-05 RX ADMIN — Medication 500 MG: at 18:07

## 2024-11-05 RX ADMIN — DORZOLAMIDE HYDROCHLORIDE AND TIMOLOL MALEATE 1 DROP: 20; 5 SOLUTION/ DROPS OPHTHALMIC at 05:39

## 2024-11-05 RX ADMIN — IPRATROPIUM BROMIDE AND ALBUTEROL SULFATE 3 ML: .5; 3 SOLUTION RESPIRATORY (INHALATION) at 18:54

## 2024-11-05 RX ADMIN — METHYLPREDNISOLONE SODIUM SUCCINATE 40 MG: 40 INJECTION, POWDER, FOR SOLUTION INTRAMUSCULAR; INTRAVENOUS at 18:07

## 2024-11-05 RX ADMIN — METHYLPREDNISOLONE SODIUM SUCCINATE 40 MG: 40 INJECTION, POWDER, FOR SOLUTION INTRAMUSCULAR; INTRAVENOUS at 12:43

## 2024-11-05 RX ADMIN — ARIPIPRAZOLE 10 MG: 10 TABLET ORAL at 05:39

## 2024-11-05 RX ADMIN — DORZOLAMIDE HYDROCHLORIDE AND TIMOLOL MALEATE 1 DROP: 20; 5 SOLUTION/ DROPS OPHTHALMIC at 18:07

## 2024-11-05 ASSESSMENT — ENCOUNTER SYMPTOMS
COUGH: 1
SHORTNESS OF BREATH: 1
WEAKNESS: 1

## 2024-11-05 ASSESSMENT — FIBROSIS 4 INDEX: FIB4 SCORE: 4.13

## 2024-11-05 NOTE — INFECTION CONTROL
11/5/2024 1:06 PM (Calvin Alvarez):     Rhino/entero isolation precautions removed as this patient was determined to no longer be symptomatic. Per [facility] isolation policy, it is appropriate to remove precautions when illness has run its duration.

## 2024-11-05 NOTE — PROGRESS NOTES
Telemetry Shift Summary    Rhythm SR  HR Range 60s-70s  Ectopy rare PVC/PAC  Measurements 0.14/0.10/0.40        Normal Values  Rhythm SR  HR Range    Measurements 0.12-0.20 / 0.06-0.10  / 0.30-0.52

## 2024-11-05 NOTE — ASSESSMENT & PLAN NOTE
Pt wheezing today, I am starting albuterol HFA, Anoro Ellipta, IV solumedrol, working with respiratory therapist.  Clearing from Rhinovirus with infection control, I discussed with RT for a nebulized Albuterol trial.  Continue IS.  Pending home oxygen [facility] confirmation on order changes.

## 2024-11-05 NOTE — PROGRESS NOTES
Telemetry Shift Summary     Rhythm: SR  Rate: 60s  Measurements: 0.14/0.08/0.40  Ectopy (reported by Monitor Tech): rare PVC/PAC     Normal Values  Rhythm: Sinus  HR:   Measurements: 0.12-0.20/0.06-0.10/0.30-0.52

## 2024-11-05 NOTE — DISCHARGE PLANNING
Received call from [facility] reporting they'll be delivering a portable tank later today. They state patient has a concentrator at residence.

## 2024-11-05 NOTE — PROGRESS NOTES
Hospital Medicine Daily Progress Note    Date of Service  11/4/2024    Chief Complaint  Austin Woodard is a 74 y.o. male admitted 10/29/2024 with fever, shortness of breath    Hospital Course  No notes on file    Interval Problem Update  11/04:  Patient stated he was having some shortness of breath  During my examination, patient was desaturating to 88% on room air.  I placed him back on 2 L nasal cannula, was very slow to improve back up to 91 to 94%.  I placed home oxygen DME orders, patient unable to be checked while ambulating, unsafe. Patient on service with [facility] oxygen.  Pulmonology did not follow-up today.  WBC 5.3.  Pleural fluid showing no growth.  Pathology pending.  We will finish IV Unasyn today.  I called daughter for updates.  Patient accepted by [facility].    11/03:   Patient denied any pain to sight of thoracentesis.  I worked with him to see his effort with IS, he was inhaling only between 250-500mL.   I discussed with pulmonologist, continue lasix. Consider outpatient PET CT chest scan with oncologist.  I called daughter for updates.  Labs pending: culture (Fluid, fungal, anaerobic, AFB), Cytology, flow cytometry.     11/02:  I discussed with pulmonologist who agreed left lower lobe appears to be a consolidation but has appearances of potential mass, recommended a thoracentesis today.  This was performed removed 800 cc.  Patient apparently fell asleep or had a vasovagal during the procedure.  He was terminated.  I reviewed's pleural fluid cultures, no growth at the moment, no Gram stain findings.  Total protein 3.3, glucose 68, pH 8.  I called daughter Kristine Woodard and provided updates.    11/01:  Patient was still reporting cough.  I checked a viral panel PCR which showed positive for rhinovirus or enterovirus.  Started isolation precautions.  I ordered a CTA chest as D-dimer was elevated at 2.8.  I reviewed the results there is no large PE, indeterminant on peripheral regions.   There is also a moderate to large left pleural effusion, small to moderate right pleural effusion.  Radiologist noting there may be a consolidation or masslike finding on the left lower lobe.    10/31:  Patient appearing lethargic.  He is legally blind.  He is currently living in a group home.  I reviewed labs, hemoglobin 12.5, WBC 7.9, platelets 144.  Sodium 139, potassium 4.2, creatinine 1.1, magnesium 2.1, Phos 2.4.  I ordered a procalcitonin level  Continue Unasyn and azithromycin  Patient lives at group home, we will need PT OT evaluation prior  Echocardiogram pending  Negative for COVID/flu/influenza on admission    I have discussed this patient's plan of care and discharge plan at IDT rounds today with Case Management, Nursing, Nursing leadership, and other members of the IDT team.    Consultants/Specialty  None    Code Status  Full Code    Disposition  The patient is not medically cleared for discharge to home or a post-acute facility.  Anticipate discharge to: Group Home    I have placed the appropriate orders for post-discharge needs.    Review of Systems  Review of Systems   Constitutional:  Negative for malaise/fatigue.   Eyes:         Chronically blind   Respiratory:  Positive for cough and shortness of breath.    Neurological:  Positive for weakness.   All other systems reviewed and are negative.       Physical Exam  Temp:  [36.6 °C (97.8 °F)-37.2 °C (98.9 °F)] 36.7 °C (98 °F)  Pulse:  [62-89] 65  Resp:  [18] 18  BP: (102-140)/(72-93) 102/72  SpO2:  [92 %-96 %] 95 %    Physical Exam  Vitals and nursing note reviewed.   Constitutional:       General: He is not in acute distress.     Appearance: He is ill-appearing. He is not toxic-appearing or diaphoretic.   HENT:      Mouth/Throat:      Mouth: Mucous membranes are dry.      Pharynx: No oropharyngeal exudate.   Eyes:      Comments: Bilaterally blind    Cardiovascular:      Rate and Rhythm: Normal rate and regular rhythm.      Pulses: Normal pulses.       Heart sounds: Normal heart sounds. No murmur heard.  Pulmonary:      Effort: Respiratory distress present.      Breath sounds: Rhonchi and rales present. No wheezing.   Abdominal:      General: Bowel sounds are normal. There is no distension.      Tenderness: There is no abdominal tenderness.   Musculoskeletal:         General: No swelling or tenderness. Normal range of motion.      Right lower leg: No edema.      Left lower leg: No edema.   Skin:     General: Skin is warm.      Capillary Refill: Capillary refill takes less than 2 seconds.      Coloration: Skin is not jaundiced or pale.   Neurological:      Mental Status: He is alert. Mental status is at baseline.      Motor: Weakness present.   Psychiatric:         Mood and Affect: Mood normal.         Behavior: Behavior normal.         Thought Content: Thought content normal.         Judgment: Judgment normal.         Fluids    Intake/Output Summary (Last 24 hours) at 11/4/2024 1737  Last data filed at 11/4/2024 1703  Gross per 24 hour   Intake 1080 ml   Output 2700 ml   Net -1620 ml        Laboratory  Recent Labs     11/03/24  0341   WBC 5.3   RBC 4.09*   HEMOGLOBIN 12.2*   HEMATOCRIT 37.6*   MCV 91.9   MCH 29.8   MCHC 32.4   RDW 55.2*   PLATELETCT 145*   MPV 9.3     Recent Labs     11/03/24  0341   SODIUM 137   POTASSIUM 4.0   CHLORIDE 105   CO2 20   GLUCOSE 91   BUN 19   CREATININE 1.02   CALCIUM 8.5                     Imaging  DX-CHEST-PORTABLE (1 VIEW)   Final Result      No significant change from prior exam.      DX-CHEST-LIMITED (1 VIEW)   Final Result         1.  No pneumothorax identified after left-sided thoracentesis.      CT-CTA CHEST PULMONARY ARTERY W/ RECONS   Final Result      1.  No evidence of pulmonary embolism. Cannot exclude segmental and subsegmental pulmonary emboli in the left lower lobe due to perfusion abnormalities.   2.  Moderate/large partially loculated left pleural effusion with adjacent consolidation. Atelectasis versus  pneumonia.   3.  Rounded consolidations of the left lower lobe. These may represent infectious or inflammatory consolidations, though are concerning for masses given shape. Recommend follow-up.   4.  Small/moderate right pleural effusion with adjacent right lower lobe atelectasis.   5.  Bilateral gynecomastia.   6.  Borderline ascending aortic ectasia measuring 4.0 cm.            EC-ECHOCARDIOGRAM COMPLETE W/O CONT   Final Result      DX-CHEST-PORTABLE (1 VIEW)   Final Result      1.  Chronic blunting of the left costophrenic angle, pleural thickening and/or small effusion.   2.  Probable small right pleural effusion.   3.  Bibasilar atelectasis and/or edema. Correlate clinically for infection.           Assessment/Plan  * Multifocal pneumonia- (present on admission)  Assessment & Plan  Found to have acute viral pneumonia, and superimposed bacterial pneumonia    Patient had low-grade fever 100.9 F on 10/29    viral panel PCR which showed positive for rhinovirus or enterovirus.  Started isolation precautions.  CTA chest as D-dimer was elevated at 2.8.  I reviewed the results there is no large PE, indeterminant on peripheral regions.  There is also a moderate to large left pleural effusion, small to moderate right pleural effusion.  Radiologist noting there may be a consolidation or masslike finding on the left lower lobe (see image below).  Finished IV Unasyn today.  Day 7.    Pleural effusion- (present on admission)  Assessment & Plan  Bilateral pleural effusion on CT scan  I discussed with pulmonologist, recommend thoracentesis.  Performed today with 800 cc removed.  I reviewed's pleural fluid cultures, no growth at the moment, no Gram stain findings.  Total protein 3.3, glucose 68, pH 8.  Postprocedure x-ray showed no pneumothorax    Due to Dasatinib. Concern for malignancy if symptoms do not improve on antibiotics.  Pleura fluid showed total nucleated cells 1009, 94% lymphocytes. Does not meet light's criteria,  indicating Transudative effusion.  Labs pending: culture (Fluid, fungal, anaerobic, AFB), Cytology, flow cytometry.         I reviewed chest x-rays, first image 11/3, than 11/2, 10/29, 8/5/2022.  Persistent left pleural effusion.  Pleural fluid culture no growth to date  Continue IV Lasix  Continue supportive care for rhinovirus viral pneumonia    Chronic myelocytic leukemia (HCC)- (present on admission)  Assessment & Plan  WBC on admission is 11.9.  Patient is on dasatinib.  Follows with Dr. Valdez  Dasatinib use   increase the risk of infections by 9% - 14%, also associated with bone marrow suppression, anemia and thrombocytopenia   High risk for pleural effusions (10-28%), pericardial effusions  Risk for pulmonary hypertension  Continue to hold home Dasatinib, given current infection with pneumonia.  Needs PET scan of chest outpatient  Dr. Valdez appointment 11/6/24.    Discharge planning issues- (present on admission)  Assessment & Plan  Pt lives at [facility] - he lives with the owners no other persons.  PT and OT recommended home health.  Patient accepted by [facility].    Elevated brain natriuretic peptide (BNP) level- (present on admission)  Assessment & Plan  Patient BNP is elevated.   Due to pleural effusions  I reviewed echocardiogram which showed a LVEF of 70%, normal diastolic function, enlarged RA, normal LA, normal RV.  Positive for trace mitral regurgitation, no aortic valvulopathy, mild tricuspid regurgitation, RVSP 45 mmHg, trace pulmonic insufficiency, normal pericardium no effusion, aortic root 3.9 cm borderline, ascending aorta 3.8 cm.    ACP (advance care planning)- (present on admission)  Assessment & Plan  Patient is a full code as per daughter    Acute encephalopathy- (present on admission)  Assessment & Plan  The patient is lethargic but easily arousable. Alert and oriented x 3-4 when awake  Likely metabolic likely secondary to hypoxia and pneumonia  Anticipate  improvement with correcting/treating pneumonia and hypoxia  Continue treatment for pneumonia and hypoxia    Elevated troponin secondary to demand ischemia (HCC)- (present on admission)  Assessment & Plan  Increased due to demand ischemia after significant bilateral pleural effusions.    Normocytic anemia- (present on admission)  Assessment & Plan  Hemoglobin 12.5, platelets 144  Monitoring    Hypoxia- (present on admission)  Assessment & Plan  Hypoxia due to acute viral pneumonia, acute bacterial community-acquired pneumonia, acute bilateral pleural effusions  During my examination, patient was desaturating to 88% on room air.  I placed him back on 2 L nasal cannula, was very slow to improve back up to 91 to 94%.  I placed home oxygen DME orders, patient unable to be checked while ambulating, unsafe. Patient on service with A-[facility] oxygen.    SALENA (obstructive sleep apnea)- (present on admission)  Assessment & Plan  Continue CPAP  I wrote instructions on DME to bleed 2 L into CPAP    CPAP (continuous positive airway pressure) dependence- (present on admission)  Assessment & Plan  Continue CPAP    Thrombocytopenia (HCC)- (present on admission)  Assessment & Plan  -Platelets are 130.  Will monitor CBC in the morning.    Platelets 123, monitoring.  No transfusions at this time    Bipolar 1 disorder (HCC)- (present on admission)  Assessment & Plan  Continue Abilify    Benign prostatic hyperplasia with lower urinary tract symptoms- (present on admission)  Assessment & Plan  Continue Flomax    Primary open angle glaucoma- (present on admission)  Assessment & Plan  continue glaucoma medications    Impaired decision making- (present on admission)  Assessment & Plan  [facility] Eliz Hawk [phone]  Dtr Kristine Woodard Cell: [phone]          VTE prophylaxis:    enoxaparin ppx      I have performed a physical exam and reviewed and updated ROS and Plan today (11/4/2024). In review of yesterday's note  (11/3/2024), there are no changes except as documented above.      Total time spent 51 minutes. I spent greater than 50% of the time for patient care, including unit/floor time, multiple face-to-face encounters with the patient, counseling on treatment plan and discussion with bedside RN.  Further, I spent time on my own review of patient's overnight events, RN notes, imaging and lab analysis, and developing my assessment and plan above.  In addition, working with , social workers, and Charge RN on case coordination on this date.    This note was generated using voice recognition software which has a chance of producing errors of grammar and content.  I have made every reasonable attempt to find and correct any errors, but it should be expected that some may not be found prior to finalization of this note.

## 2024-11-05 NOTE — CARE PLAN
The patient is Watcher - Medium risk of patient condition declining or worsening    Shift Goals  Clinical Goals: IV ABx  Patient Goals: Comfort  Family Goals: VANESSA    Progress made toward(s) clinical / shift goals:    Problem: Knowledge Deficit - Standard  Goal: Patient and family/care givers will demonstrate understanding of plan of care, disease process/condition, diagnostic tests and medications  Outcome: Progressing     Problem: Hemodynamics  Goal: Patient's hemodynamics, fluid balance and neurologic status will be stable or improve  Outcome: Progressing     Problem: Fluid Volume  Goal: Fluid volume balance will be maintained  Outcome: Progressing       Patient is not progressing towards the following goals:

## 2024-11-05 NOTE — PROGRESS NOTES
Hospital Medicine Daily Progress Note    Date of Service  11/5/2024    Chief Complaint  Austin Woodard is a 74 y.o. male admitted 10/29/2024 with fever, shortness of breath    Hospital Course  No notes on file    Interval Problem Update  11/05:  Pt wheezing today, I am starting albuterol HFA, Anoro Ellipta, IV solumedrol, working with respiratory therapist.  Clearing from Rhinovirus with infection control, I discussed with RT for a nebulized Albuterol trial.  Continue IS.  Pending home oxygen [facility] confirmation on order changes.    11/04:  Patient stated he was having some shortness of breath  During my examination, patient was desaturating to 88% on room air.  I placed him back on 2 L nasal cannula, was very slow to improve back up to 91 to 94%.  I placed home oxygen DME orders, patient unable to be checked while ambulating, unsafe. Patient on service with [facility] oxygen.  Pulmonology did not follow-up today.  WBC 5.3.  Pleural fluid showing no growth.  Pathology pending.  We will finish IV Unasyn today.  I called daughter for updates.  Patient accepted by [facility].    11/03:   Patient denied any pain to sight of thoracentesis.  I worked with him to see his effort with IS, he was inhaling only between 250-500mL.   I discussed with pulmonologist, continue lasix. Consider outpatient PET CT chest scan with oncologist.  I called daughter for updates.  Labs pending: culture (Fluid, fungal, anaerobic, AFB), Cytology, flow cytometry.     11/02:  I discussed with pulmonologist who agreed left lower lobe appears to be a consolidation but has appearances of potential mass, recommended a thoracentesis today.  This was performed removed 800 cc.  Patient apparently fell asleep or had a vasovagal during the procedure.  He was terminated.  I reviewed's pleural fluid cultures, no growth at the moment, no Gram stain findings.  Total protein 3.3, glucose 68, pH 8.  I called daughter Kristine Woodard and provided  updates.    11/01:  Patient was still reporting cough.  I checked a viral panel PCR which showed positive for rhinovirus or enterovirus.  Started isolation precautions.  I ordered a CTA chest as D-dimer was elevated at 2.8.  I reviewed the results there is no large PE, indeterminant on peripheral regions.  There is also a moderate to large left pleural effusion, small to moderate right pleural effusion.  Radiologist noting there may be a consolidation or masslike finding on the left lower lobe.    10/31:  Patient appearing lethargic.  He is legally blind.  He is currently living in a group home.  I reviewed labs, hemoglobin 12.5, WBC 7.9, platelets 144.  Sodium 139, potassium 4.2, creatinine 1.1, magnesium 2.1, Phos 2.4.  I ordered a procalcitonin level  Continue Unasyn and azithromycin  Patient lives at group home, we will need PT OT evaluation prior  Echocardiogram pending  Negative for COVID/flu/influenza on admission    I have discussed this patient's plan of care and discharge plan at IDT rounds today with Case Management, Nursing, Nursing leadership, and other members of the IDT team.    Consultants/Specialty  None    Code Status  Full Code    Disposition  The patient is not medically cleared for discharge to home or a post-acute facility.  Anticipate discharge to: home with organized home healthcare and close outpatient follow-up group home    I have placed the appropriate orders for post-discharge needs.    Review of Systems  Review of Systems   Constitutional:  Negative for malaise/fatigue.   Eyes:         Chronically blind   Respiratory:  Positive for cough and shortness of breath.    Neurological:  Positive for weakness.   All other systems reviewed and are negative.       Physical Exam  Temp:  [36.1 °C (97 °F)-36.8 °C (98.3 °F)] 36.1 °C (97 °F)  Pulse:  [64-76] 72  Resp:  [18] 18  BP: ()/(57-92) 118/69  SpO2:  [93 %-97 %] 97 %    Physical Exam  Vitals and nursing note reviewed.   Constitutional:        General: He is not in acute distress.     Appearance: He is ill-appearing. He is not toxic-appearing or diaphoretic.   HENT:      Mouth/Throat:      Mouth: Mucous membranes are dry.      Pharynx: No oropharyngeal exudate.   Eyes:      Comments: Bilaterally blind    Cardiovascular:      Rate and Rhythm: Normal rate and regular rhythm.      Pulses: Normal pulses.      Heart sounds: Normal heart sounds. No murmur heard.  Pulmonary:      Effort: Respiratory distress present.      Breath sounds: Rhonchi and rales present. No wheezing.   Abdominal:      General: Bowel sounds are normal. There is no distension.      Tenderness: There is no abdominal tenderness.   Musculoskeletal:         General: No swelling or tenderness. Normal range of motion.      Right lower leg: No edema.      Left lower leg: No edema.   Skin:     General: Skin is warm.      Capillary Refill: Capillary refill takes less than 2 seconds.      Coloration: Skin is not jaundiced or pale.   Neurological:      Mental Status: He is alert. Mental status is at baseline.      Motor: Weakness present.   Psychiatric:         Mood and Affect: Mood normal.         Behavior: Behavior normal.         Thought Content: Thought content normal.         Judgment: Judgment normal.         Fluids    Intake/Output Summary (Last 24 hours) at 11/5/2024 1536  Last data filed at 11/5/2024 1400  Gross per 24 hour   Intake 880 ml   Output --   Net 880 ml        Laboratory  Recent Labs     11/03/24  0341 11/05/24  0215   WBC 5.3 6.8   RBC 4.09* 4.09*   HEMOGLOBIN 12.2* 12.1*   HEMATOCRIT 37.6* 35.9*   MCV 91.9 87.8   MCH 29.8 29.6   MCHC 32.4 33.7   RDW 55.2* 51.6*   PLATELETCT 145* 171   MPV 9.3 8.8*     Recent Labs     11/03/24  0341 11/05/24  0215   SODIUM 137 139   POTASSIUM 4.0 3.5*   CHLORIDE 105 102   CO2 20 26   GLUCOSE 91 86   BUN 19 16   CREATININE 1.02 0.99   CALCIUM 8.5 8.6                     Imaging  DX-CHEST-PORTABLE (1 VIEW)   Final Result      No significant  change from prior exam.      DX-CHEST-LIMITED (1 VIEW)   Final Result         1.  No pneumothorax identified after left-sided thoracentesis.      CT-CTA CHEST PULMONARY ARTERY W/ RECONS   Final Result      1.  No evidence of pulmonary embolism. Cannot exclude segmental and subsegmental pulmonary emboli in the left lower lobe due to perfusion abnormalities.   2.  Moderate/large partially loculated left pleural effusion with adjacent consolidation. Atelectasis versus pneumonia.   3.  Rounded consolidations of the left lower lobe. These may represent infectious or inflammatory consolidations, though are concerning for masses given shape. Recommend follow-up.   4.  Small/moderate right pleural effusion with adjacent right lower lobe atelectasis.   5.  Bilateral gynecomastia.   6.  Borderline ascending aortic ectasia measuring 4.0 cm.            EC-ECHOCARDIOGRAM COMPLETE W/O CONT   Final Result      DX-CHEST-PORTABLE (1 VIEW)   Final Result      1.  Chronic blunting of the left costophrenic angle, pleural thickening and/or small effusion.   2.  Probable small right pleural effusion.   3.  Bibasilar atelectasis and/or edema. Correlate clinically for infection.           Assessment/Plan  * Multifocal pneumonia- (present on admission)  Assessment & Plan  Found to have acute viral pneumonia, and superimposed bacterial pneumonia    Patient had low-grade fever 100.9 F on 10/29    viral panel PCR which showed positive for rhinovirus or enterovirus.  Started isolation precautions.  CTA chest as D-dimer was elevated at 2.8.  I reviewed the results there is no large PE, indeterminant on peripheral regions.  There is also a moderate to large left pleural effusion, small to moderate right pleural effusion.  Radiologist noting there may be a consolidation or masslike finding on the left lower lobe (see image below).  Finished IV Unasyn, 11/4, seven days.    Acute on chronic respiratory failure with hypoxia (HCC)- (present on  admission)  Assessment & Plan  Pt wheezing today, I am starting albuterol HFA, Anoro Ellipta, IV solumedrol, working with respiratory therapist.  Clearing from Rhinovirus with infection control, I discussed with RT for a nebulized Albuterol trial.  Continue IS.  Pending home oxygen [facility] confirmation on order changes.    Pleural effusion- (present on admission)  Assessment & Plan  Bilateral pleural effusion on CT scan  I discussed with pulmonologist, recommend thoracentesis.  Performed today with 800 cc removed.  I reviewed's pleural fluid cultures, no growth at the moment, no Gram stain findings.  Total protein 3.3, glucose 68, pH 8.  Postprocedure x-ray showed no pneumothorax    Due to Dasatinib. Concern for malignancy if symptoms do not improve on antibiotics.  Pleura fluid showed total nucleated cells 1009, 94% lymphocytes. Does not meet light's criteria, indicating Transudative effusion.  Labs pending: culture (Fluid, fungal, anaerobic, AFB), Cytology, flow cytometry.   11/4:  I reviewed chest x-rays, first image 11/3, than 11/2, 10/29, 8/5/2022.  Persistent left pleural effusion.  Pleural fluid culture no growth to date  Continue IV Lasix  Continue supportive care for rhinovirus viral pneumonia    11/5:  Pleural effusions are likely to remain acute on chronic.  Continue lasix, fluid restrictions.    Chronic myelocytic leukemia (HCC)- (present on admission)  Assessment & Plan  WBC on admission is 11.9.  Patient is on dasatinib.  Follows with Dr. Valdez  Dasatinib use   increase the risk of infections by 9% - 14%, also associated with bone marrow suppression, anemia and thrombocytopenia   High risk for pleural effusions (10-28%), pericardial effusions  Risk for pulmonary hypertension  Continue to hold home Dasatinib, given current infection with pneumonia.  Needs PET scan of chest outpatient  Dr. Valdez appointment rescheduled by daughter.    Discharge planning issues- (present on admission)  Assessment &  Plan  Pt lives at [facility] - he lives with the owners no other persons.  PT and OT recommended home health.  Patient accepted by [facility].    Elevated brain natriuretic peptide (BNP) level- (present on admission)  Assessment & Plan  Patient BNP is elevated.   Due to pleural effusions  I reviewed echocardiogram which showed a LVEF of 70%, normal diastolic function, enlarged RA, normal LA, normal RV.  Positive for trace mitral regurgitation, no aortic valvulopathy, mild tricuspid regurgitation, RVSP 45 mmHg, trace pulmonic insufficiency, normal pericardium no effusion, aortic root 3.9 cm borderline, ascending aorta 3.8 cm.    ACP (advance care planning)- (present on admission)  Assessment & Plan  Patient is a full code as per daughter    Acute encephalopathy- (present on admission)  Assessment & Plan  The patient is lethargic but easily arousable. Alert and oriented x 3-4 when awake  Likely metabolic likely secondary to hypoxia and pneumonia  Anticipate improvement with correcting/treating pneumonia and hypoxia  Continue treatment for pneumonia and hypoxia    Elevated troponin secondary to demand ischemia (HCC)- (present on admission)  Assessment & Plan  Increased due to demand ischemia after significant bilateral pleural effusions.    Normocytic anemia- (present on admission)  Assessment & Plan  Hemoglobin 12.5, platelets 144  Monitoring    Hypoxia- (present on admission)  Assessment & Plan  Hypoxia due to acute viral pneumonia, acute bacterial community-acquired pneumonia, acute bilateral pleural effusions  During my examination, patient was desaturating to 88% on room air.  I placed him back on 2 L nasal cannula, was very slow to improve back up to 91 to 94%.  I placed home oxygen DME orders, patient unable to be checked while ambulating, unsafe. Patient on service with [facility] oxygen.  We are pending patient's DME orders for confirmation    SALENA (obstructive sleep apnea)- (present on  admission)  Assessment & Plan  Continue CPAP  I wrote instructions on DME to bleed 2 L into CPAP    CPAP (continuous positive airway pressure) dependence- (present on admission)  Assessment & Plan  Continue CPAP    Thrombocytopenia (HCC)- (present on admission)  Assessment & Plan  -Platelets are 130.  Will monitor CBC in the morning.    Platelets improved, 171    Bipolar 1 disorder (HCC)- (present on admission)  Assessment & Plan  Continue Abilify    Benign prostatic hyperplasia with lower urinary tract symptoms- (present on admission)  Assessment & Plan  Continue tamsulosin    Primary open angle glaucoma- (present on admission)  Assessment & Plan  continue glaucoma medications    Impaired decision making- (present on admission)  Assessment & Plan  [facility] [place] [phone]  Dtr Kristine Woodard Cell: [phone]          VTE prophylaxis:    enoxaparin ppx      I have performed a physical exam and reviewed and updated ROS and Plan today (11/5/2024). In review of yesterday's note (11/4/2024), there are no changes except as documented above.      Total time spent 52 minutes.    This included my review of patient's overnight RN notes, face to face interview, physical examination, lab analysis.  Also includes repeat visits with the patient, and my documented assessments and interventions above.  In addition, I spoke with entire care team on patient's treatment plan, and DC planning on morning rounds and IDT rounds.    This note was generated using voice recognition software which has a chance of producing errors of grammar and content.  I have made every reasonable attempt to find and correct any errors, but it should be expected that some may not be found prior to finalization of this note.

## 2024-11-05 NOTE — DISCHARGE PLANNING
CLARK received call from [employer] stating they had received emailed order request and CLARK transferred call to LISETH NAM for further questions.

## 2024-11-06 ENCOUNTER — PHARMACY VISIT (OUTPATIENT)
Dept: PHARMACY | Facility: MEDICAL CENTER | Age: 74
End: 2024-11-06
Payer: COMMERCIAL

## 2024-11-06 ENCOUNTER — APPOINTMENT (OUTPATIENT)
Dept: RADIOLOGY | Facility: MEDICAL CENTER | Age: 74
DRG: 193 | End: 2024-11-06
Attending: INTERNAL MEDICINE
Payer: COMMERCIAL

## 2024-11-06 VITALS
SYSTOLIC BLOOD PRESSURE: 95 MMHG | OXYGEN SATURATION: 93 % | HEIGHT: 68 IN | HEART RATE: 69 BPM | WEIGHT: 213.41 LBS | BODY MASS INDEX: 32.34 KG/M2 | RESPIRATION RATE: 16 BRPM | TEMPERATURE: 98.8 F | DIASTOLIC BLOOD PRESSURE: 67 MMHG

## 2024-11-06 PROCEDURE — A9270 NON-COVERED ITEM OR SERVICE: HCPCS | Performed by: INTERNAL MEDICINE

## 2024-11-06 PROCEDURE — 94760 N-INVAS EAR/PLS OXIMETRY 1: CPT

## 2024-11-06 PROCEDURE — 99239 HOSP IP/OBS DSCHRG MGMT >30: CPT | Performed by: INTERNAL MEDICINE

## 2024-11-06 PROCEDURE — 700101 HCHG RX REV CODE 250: Performed by: INTERNAL MEDICINE

## 2024-11-06 PROCEDURE — 71045 X-RAY EXAM CHEST 1 VIEW: CPT

## 2024-11-06 PROCEDURE — 51798 US URINE CAPACITY MEASURE: CPT

## 2024-11-06 PROCEDURE — 700111 HCHG RX REV CODE 636 W/ 250 OVERRIDE (IP): Performed by: HOSPITALIST

## 2024-11-06 PROCEDURE — 94660 CPAP INITIATION&MGMT: CPT

## 2024-11-06 PROCEDURE — 94640 AIRWAY INHALATION TREATMENT: CPT

## 2024-11-06 PROCEDURE — 700102 HCHG RX REV CODE 250 W/ 637 OVERRIDE(OP): Performed by: INTERNAL MEDICINE

## 2024-11-06 PROCEDURE — 700111 HCHG RX REV CODE 636 W/ 250 OVERRIDE (IP): Mod: JZ | Performed by: INTERNAL MEDICINE

## 2024-11-06 PROCEDURE — A9270 NON-COVERED ITEM OR SERVICE: HCPCS | Performed by: HOSPITALIST

## 2024-11-06 PROCEDURE — RXMED WILLOW AMBULATORY MEDICATION CHARGE: Performed by: INTERNAL MEDICINE

## 2024-11-06 PROCEDURE — 97535 SELF CARE MNGMENT TRAINING: CPT

## 2024-11-06 PROCEDURE — 700102 HCHG RX REV CODE 250 W/ 637 OVERRIDE(OP): Performed by: HOSPITALIST

## 2024-11-06 RX ORDER — FUROSEMIDE 40 MG/1
40 TABLET ORAL DAILY
Qty: 30 TABLET | Refills: 3 | Status: SHIPPED | OUTPATIENT
Start: 2024-11-06 | End: 2024-12-06

## 2024-11-06 RX ADMIN — TAMSULOSIN HYDROCHLORIDE 0.4 MG: 0.4 CAPSULE ORAL at 09:14

## 2024-11-06 RX ADMIN — METHYLPREDNISOLONE SODIUM SUCCINATE 40 MG: 40 INJECTION, POWDER, FOR SOLUTION INTRAMUSCULAR; INTRAVENOUS at 00:44

## 2024-11-06 RX ADMIN — ARIPIPRAZOLE 10 MG: 10 TABLET ORAL at 05:05

## 2024-11-06 RX ADMIN — SENNOSIDES AND DOCUSATE SODIUM 2 TABLET: 50; 8.6 TABLET ORAL at 05:05

## 2024-11-06 RX ADMIN — IPRATROPIUM BROMIDE AND ALBUTEROL SULFATE 3 ML: .5; 3 SOLUTION RESPIRATORY (INHALATION) at 07:14

## 2024-11-06 RX ADMIN — FUROSEMIDE 40 MG: 10 INJECTION, SOLUTION INTRAMUSCULAR; INTRAVENOUS at 09:14

## 2024-11-06 RX ADMIN — ASPIRIN 81 MG 81 MG: 81 TABLET ORAL at 05:05

## 2024-11-06 RX ADMIN — LACTULOSE 15 ML: 20 SOLUTION ORAL at 05:05

## 2024-11-06 RX ADMIN — UMECLIDINIUM BROMIDE AND VILANTEROL TRIFENATATE 1 PUFF: 62.5; 25 POWDER RESPIRATORY (INHALATION) at 07:14

## 2024-11-06 RX ADMIN — DORZOLAMIDE HYDROCHLORIDE AND TIMOLOL MALEATE 1 DROP: 20; 5 SOLUTION/ DROPS OPHTHALMIC at 05:06

## 2024-11-06 RX ADMIN — IPRATROPIUM BROMIDE AND ALBUTEROL SULFATE 3 ML: .5; 3 SOLUTION RESPIRATORY (INHALATION) at 14:23

## 2024-11-06 SDOH — ECONOMIC STABILITY: TRANSPORTATION INSECURITY
IN THE PAST 12 MONTHS, HAS LACK OF RELIABLE TRANSPORTATION KEPT YOU FROM MEDICAL APPOINTMENTS, MEETINGS, WORK OR FROM GETTING THINGS NEEDED FOR DAILY LIVING?: NO

## 2024-11-06 SDOH — ECONOMIC STABILITY: TRANSPORTATION INSECURITY
IN THE PAST 12 MONTHS, HAS THE LACK OF TRANSPORTATION KEPT YOU FROM MEDICAL APPOINTMENTS OR FROM GETTING MEDICATIONS?: NO

## 2024-11-06 ASSESSMENT — COGNITIVE AND FUNCTIONAL STATUS - GENERAL
PERSONAL GROOMING: A LITTLE
HELP NEEDED FOR BATHING: A LOT
DRESSING REGULAR UPPER BODY CLOTHING: A LOT
TOILETING: A LOT
DRESSING REGULAR LOWER BODY CLOTHING: A LOT
DAILY ACTIVITIY SCORE: 14
SUGGESTED CMS G CODE MODIFIER DAILY ACTIVITY: CK
EATING MEALS: A LITTLE

## 2024-11-06 ASSESSMENT — SOCIAL DETERMINANTS OF HEALTH (SDOH)
WITHIN THE PAST 12 MONTHS, THE FOOD YOU BOUGHT JUST DIDN'T LAST AND YOU DIDN'T HAVE MONEY TO GET MORE: NEVER TRUE
WITHIN THE PAST 12 MONTHS, YOU WORRIED THAT YOUR FOOD WOULD RUN OUT BEFORE YOU GOT THE MONEY TO BUY MORE: NEVER TRUE
IN THE PAST 12 MONTHS, HAS THE ELECTRIC, GAS, OIL, OR WATER COMPANY THREATENED TO SHUT OFF SERVICE IN YOUR HOME?: NO

## 2024-11-06 ASSESSMENT — FIBROSIS 4 INDEX: FIB4 SCORE: 4.13

## 2024-11-06 ASSESSMENT — GAIT ASSESSMENTS: DISTANCE (FEET): 15

## 2024-11-06 NOTE — DISCHARGE PLANNING
Per LISETH NAM, DPA called [employer] to ask if rep could call group home owner to coordinate delivery of DME to his place. [facility] agreed and gave him Steve's number @ [phone].    LISETH NAM notified

## 2024-11-06 NOTE — DISCHARGE PLANNING
Per LISETH NAM, [facility] sent DME order for nebulizer set up. CLARK called Fermín at [employer] and let him know we were faxing it over.     LISETH NAM notified

## 2024-11-06 NOTE — DISCHARGE SUMMARY
"Discharge Summary    CHIEF COMPLAINT ON ADMISSION  Chief Complaint   Patient presents with    Shortness of Breath     Home health nurse noted O2 at 85%.  Has a cough x few days.         Reason for Admission  EMS     Admission Date  10/29/2024    CODE STATUS  Full Code    HPI & HOSPITAL COURSE  This is \"Austin Woodard is a 74 y.o. male, with h/o CML on immunotherapy, HTN, Depression, glaucoma and bipolar/ depression, who requires total assist with ADLs therefore residing in a care home, brought to the ED today on 10/29/2024 for evaluation of worsening shortness of breath, hypoxia, cough and fever.  This started 2 days ago and getting progressively worse.  He was hypoxic to 85% on arrival and currently requiring 2 L of oxygen via nasal cannula. \" (As per admitting physician Dr. Rascon on H&P).  Patient apparently was admitted with acute metabolic encephalopathy due to acute hypoxia and pneumonia.    Upon his admission patient was having acute hypoxemic respiratory failure.  This was due to bilateral pulmonary effusions.  We tested a viral panel PCR which returned positive for acute rhinovirus/enterovirus pneumonia.  Unfortunate this was complicating his acute on chronic pleural effusion.  We did treat him for possible superimposed bacterial pneumonia, treated with IV Unasyn from 10/9 and ended on 11/4.  He also completed full treatment of p.o. azithromycin.  We were able to clear patient of the rhinovirus with infection control on 11/5/2024.    As for patient's bilateral pleural effusions, this appeared to be acute on chronic.  These are complications from his chronic Dasatinib immunotherapy.  Dasatinib side effects  increase the risk of infections by 9% - 14%, also associated with bone marrow suppression, anemia and thrombocytopenia   High risk for pleural effusions (10-28%), pericardial effusions  Risk for pulmonary hypertension    Patient had an elevated D-dimer of 2.8.  We had examined patient with a CTA chest " "scan which showed a concerning finding on the left lower lobe for possible pneumonia versus mass.  I consulted pulmonology.    Patient had a thoracentesis with 800 cc pleural fluid removed, performed with Dr. Graham on 11/2/2024.  Pleura fluid showed total nucleated cells 1009, 94% lymphocytes.  The pleural fluid does not meet light's criteria, indicating Transudative effusion.  Labs pending: cultures (Fluid, fungal, anaerobic, AFB), Cytology.  Fungal stain was negative, acid-fast stain negative.  Pleural fluid cultures currently are negative.  Pleural fluid - \" increased NK cells identified\". Lymphocytes 50%, monocytes 8.5%, granulocytes 40%.  Lymphocytes: T-cells 42%, NK-cells 55%, B-cells 2.9%.     I had been in discussion with patient's guardian, daughter Kristine Woodard.  I gave recommendations including following with Dr. Valdez, appointment on 12/3/2024 to discuss possible PET chest scan outpatient to evaluate the left lower lobe potential mass.  Patient will need to continue with Dr. Valdez to continue with Dasatinib medication.     At this time patient is requiring ongoing continuous oxygen.  He was desaturating to 88% without supplemental oxygen.  We confirmed and changed his oxygen orders with a [facility] D[facility] patient is to be on continuous 2 L nasal cannula and bleed in 2 L into CPAP.        I reviewed chest x-rays, first image 11/3, than 11/2, 10/29, 8/5/2022.  Persistent left pleural effusion.      Therefore, he is discharged in fair and stable condition to home with organized home healthcare and close outpatient follow-up.    The patient met 2-midnight criteria for an inpatient stay at the time of discharge.    Discharge Date  11/06/2024    FOLLOW UP ITEMS POST DISCHARGE  With PCP, with Dr. Valdez  Referral given for [facility]    DISCHARGE DIAGNOSES  Principal Problem:    Multifocal pneumonia (POA: Yes)  Active Problems:    Pleural effusion (POA: Yes)    Acute on chronic respiratory failure with " hypoxia (HCC) (POA: Yes)    Chronic myelocytic leukemia (HCC) (POA: Yes)    Primary open angle glaucoma (Chronic) (POA: Yes)      Overview: ICD-10 transition    Benign prostatic hyperplasia with lower urinary tract symptoms (POA: Yes)    Bipolar 1 disorder (HCC) (Chronic) (POA: Yes)    Thrombocytopenia (HCC) (POA: Yes)    CPAP (continuous positive airway pressure) dependence (POA: Yes)    SALENA (obstructive sleep apnea) (Chronic) (POA: Yes)    Hypoxia (POA: Yes)    Normocytic anemia (POA: Yes)    Elevated troponin secondary to demand ischemia (HCC) (POA: Yes)    Acute encephalopathy (POA: Yes)    ACP (advance care planning) (POA: Yes)    Elevated brain natriuretic peptide (BNP) level (POA: Yes)    Discharge planning issues (POA: Yes)    Impaired decision making (Chronic) (POA: Yes)  Resolved Problems:    * No resolved hospital problems. *      FOLLOW UP  No future appointments.  [facility]  [address] [place] [id number]  [phone]        Travis Valdez M.D.  [address]  [facility] [address]  [phone]    Go on 12/3/2024  -- Please discuss if a PET CT scan is appropriate to evaluate left lower lobe.  -- Please continue Dasatinib as appropriate    Chaya Landis, PERLAPLuizRLuizNLuiz  [address] [facility] [id number] [phone]    Follow up      [facility] - PULMONARY MEDICINE  [address]  [facility] [address]  [phone]  Follow up  needs repeat CT chest with contrast in 4-6 weeks outpatient to follow these consolidations to resolution and discuss further diagnostic options if they don't resolve      MEDICATIONS ON DISCHARGE     Medication List        START taking these medications        Instructions   furosemide 40 MG Tabs  Commonly known as: Lasix   Take 1 Tablet by mouth every day for 30 days. Continue Neutra-phos for potassium supplementation with Lasix/furosemide  Dose: 40 mg     ipratropium-albuterol 0.5-2.5 (3) MG/3ML nebulizer  solution  Commonly known as: Duoneb   Doctor's comments: 30 day supply  Take 3 mL by nebulization every 6 hours as needed for Shortness of Breath for up to 30 days.  Dose: 3 mL            CONTINUE taking these medications        Instructions   allopurinol 300 MG Tabs  Commonly known as: Zyloprim   Doctor's comments: Please disregard the refill sent for this med under PA-Kathleen Fuentes's name. Pt is now assigned to Jose Alfredo Landis. Thank you  Take 1 Tablet by mouth every day.  Dose: 300 mg     ARIPiprazole 10 MG Tabs  Commonly known as: Abilify   TAKE 1 TABLET BY MOUTH ONCE DAILY     artificial tears 1.4 % Soln   Administer 1 Drop into both eyes as needed.  Dose: 1 Drop     Aspirin Low Dose 81 MG Chew chewable tablet  Generic drug: aspirin   CHEW 1 TABLET BY MOUTH ONCE DAILY     benzonatate 100 MG Caps  Commonly known as: Tessalon   Take 1 Capsule by mouth 3 times a day as needed for Cough.  Dose: 100 mg     chlorhexidine 0.12 % Soln  Commonly known as: Peridex   SWISH AND SPIT 15MLS BY MOUTH TWICE DAILY     dasatinib 50 MG tablet  Commonly known as: Sprycel   Take 50 mg by mouth every day.  Dose: 50 mg     docusate sodium 100 MG Caps  Commonly known as: Colace   TAKE 1 CAPSULE BY MOUTH ONCE DAILY     dorzolamide-timolol 2-0.5 % Soln  Commonly known as: Cosopt   Administer 1 Drop into the right eye 2 times a day.  Dose: 1 Drop     guaifenesin dextromethorphan sugar free  MG/5ML Liqd soln  Commonly known as: Robafen DM Cough   Take 10 mL by mouth every 6 hours as needed for Cough.  Dose: 10 mL     latanoprost 0.005 % Soln  Commonly known as: Xalatan   Administer 1 Drop into the right eye every evening.  Dose: 1 Drop     loperamide 2 MG Caps  Commonly known as: Imodium   Take 1 Capsule by mouth 4 times a day as needed for Diarrhea.  Dose: 2 mg     Myrbetriq 50 MG Tb24  Generic drug: Mirabegron ER   Take 50 mg by mouth every day at 6 PM.  Dose: 50 mg     niacin 500 MG Tabs   TAKE 1 TABLET BY MOUTH EVERY  EVENING  Dose: 500 mg     Phospha 250 Neutral [id number] MG tablet  Generic drug: phosphorus   TAKE 1 TABLET BY MOUTH TWICE DAILY     polyethylene glycol/lytes Pack  Commonly known as: Miralax   Take 1 Packet by mouth 1 time a day as needed (for no BM in 24 hours).  Dose: 17 g     PREPARATION H EX   Apply  topically 2 times a day as needed.     tamsulosin 0.4 MG capsule  Commonly known as: Flomax   TAKE 1 CAPSULE BY MOUTH ONCE DAILY (PM)     triamcinolone acetonide 0.1 % Crea  Commonly known as: Kenalog   APPLY A SMALL AMOUNT TO AFFECTED AREA TWICE A DAY AS NEEDED FOR RASH. DO NOT EXCEED 14 DAYS     vitamin B-12 1000 MCG Tabs   Take 1 Tablet by mouth every day.  Dose: 1,000 mcg     vitamin D 2000 UNIT Tabs   TAKE 1 TABLET BY MOUTH ONCE DAILY (PM)            STOP taking these medications      ibuprofen 200 MG Tabs  Commonly known as: Motrin              Allergies  Allergies   Allergen Reactions    Celexa Hives     And paranoia     Zoloft Hives     paranoia       DIET  Orders Placed This Encounter   Procedures    Diet Order Diet: Cardiac; Fluid modifications: (optional): 1500 ml Fluid Restriction; Tray Modifications (optional): SLP - 1:1 Supervision by Nursing     Standing Status:   Standing     Number of Occurrences:   1     Order Specific Question:   Diet:     Answer:   Cardiac [6]     Order Specific Question:   Fluid modifications: (optional)     Answer:   1500 ml Fluid Restriction [9]     Order Specific Question:   Tray Modifications (optional)     Answer:   SLP - 1:1 Supervision by Nursing       ACTIVITY  As tolerated. With assistance and walker.  Weight bearing as tolerated    CONSULTATIONS  [facility]    PROCEDURES  Thoracentesis    LABORATORY  Lab Results   Component Value Date    SODIUM 139 11/05/2024    POTASSIUM 3.5 (L) 11/05/2024    CHLORIDE 102 11/05/2024    CO2 26 11/05/2024    GLUCOSE 86 11/05/2024    BUN 16 11/05/2024    CREATININE 0.99 11/05/2024        Lab Results   Component Value Date     WBC 6.8 11/05/2024    HEMOGLOBIN 12.1 (L) 11/05/2024    HEMATOCRIT 35.9 (L) 11/05/2024    PLATELETCT 171 11/05/2024      DX-CHEST-PORTABLE (1 VIEW)   Final Result      Some interval worsening of volume loss and consolidation within the left lung.      DX-CHEST-PORTABLE (1 VIEW)   Final Result      No significant change from prior exam.      DX-CHEST-LIMITED (1 VIEW)   Final Result         1.  No pneumothorax identified after left-sided thoracentesis.      CT-CTA CHEST PULMONARY ARTERY W/ RECONS   Final Result      1.  No evidence of pulmonary embolism. Cannot exclude segmental and subsegmental pulmonary emboli in the left lower lobe due to perfusion abnormalities.   2.  Moderate/large partially loculated left pleural effusion with adjacent consolidation. Atelectasis versus pneumonia.   3.  Rounded consolidations of the left lower lobe. These may represent infectious or inflammatory consolidations, though are concerning for masses given shape. Recommend follow-up.   4.  Small/moderate right pleural effusion with adjacent right lower lobe atelectasis.   5.  Bilateral gynecomastia.   6.  Borderline ascending aortic ectasia measuring 4.0 cm.            EC-ECHOCARDIOGRAM COMPLETE W/O CONT   Final Result      DX-CHEST-PORTABLE (1 VIEW)   Final Result      1.  Chronic blunting of the left costophrenic angle, pleural thickening and/or small effusion.   2.  Probable small right pleural effusion.   3.  Bibasilar atelectasis and/or edema. Correlate clinically for infection.          Total time of the discharge process exceeds 43 minutes.

## 2024-11-06 NOTE — CARE PLAN
The patient is Stable - Low risk of patient condition declining or worsening    Shift Goals  Clinical Goals: wean O2, home O2, isolation  Patient Goals: rest/comfort  Family Goals: VANESSA    Progress made toward(s) clinical / shift goals:    Problem: Knowledge Deficit - Standard  Goal: Patient and family/care givers will demonstrate understanding of plan of care, disease process/condition, diagnostic tests and medications  Outcome: Progressing     Problem: Fluid Volume  Goal: Fluid volume balance will be maintained  Outcome: Progressing     Problem: Respiratory  Goal: Patient will achieve/maintain optimum respiratory ventilation and gas exchange  Outcome: Progressing     Problem: Communication  Goal: The ability to communicate needs accurately and effectively will improve  Outcome: Progressing       Patient is not progressing towards the following goals:

## 2024-11-06 NOTE — DISCHARGE PLANNING
DC Transport Scheduled    Transport Company Scheduled:  [employer]  Spoke with Henry at [employer] to schedule transport.    Scheduled Date: 11/6/2024  Scheduled Time: 1600    Transport Type: Wheelchair  Destination:   [facility]   Destination address: [place] [id number]    Notified care team of scheduled transport via Vo[employer].     If there are any changes needed to the DC transportation scheduled, please contact [facility] at ext. [phone] between the hours of 5546-3386 Mon-Fri. If outside those hours, contact the ED Case Manager at ext. [phone].

## 2024-11-06 NOTE — CARE PLAN
The patient is Stable - Low risk of patient condition declining or worsening    Shift Goals  Clinical Goals: Wean O2, IV Solumedrol  Patient Goals: Comfort  Family Goals: VANESSA    Progress made toward(s) clinical / shift goals:    Problem: Knowledge Deficit - Standard  Goal: Patient and family/care givers will demonstrate understanding of plan of care, disease process/condition, diagnostic tests and medications  Outcome: Progressing     Problem: Fluid Volume  Goal: Fluid volume balance will be maintained  Outcome: Progressing       Patient is not progressing towards the following goals:

## 2024-11-06 NOTE — THERAPY
Occupational Therapy  Daily Treatment     Patient Name: Austin Woodard  Age:  74 y.o., Sex:  male  Medical Record #: [id number]  Today's Date: 11/6/2024     Precautions  Precautions: (P) Fall Risk  Comments: (P) Legaly blind    Assessment    Pt demonstrated Min assist sit/stand, Mod assist bed mobility (to/from supine/EOB sitting), Min assist FWW, Min assist transfers, Mod assist UBCM, max assist LBCM, Max assist toileting.  Therapist reviewed environmental/safety awareness, fall precautions, AE/DME, ADL's and transfers.  Barriers include limite activity tolerance, impaired balance, generalized weakness.    Plan    Treatment Plan Status: (P) Continue Current Treatment Plan  Type of Treatment: Self Care / Activities of Daily Living, Adaptive Equipment, Neuro Re-Education / Balance, Therapeutic Exercises, Therapeutic Activity, Family / Caregiver Training  Treatment Frequency: 3 Times per Week  Treatment Duration: Until Therapy Goals Met    DC Equipment Recommendations: None  Discharge Recommendations: (P) Recommend home health for continued occupational therapy services    Subjective    Pt was drowsy, frqnt tactile/VQ's to initiate/attend/complete task.     Objective       11/06/24 0935    Services   Is patient using  services for this encounter? No   Precautions   Precautions Fall Risk   Comments Legaly blind   Vitals   Pulse Oximetry 92 %   O2 (LPM) 2   O2 Delivery Device Nasal Cannula   Pain   Intervention Declines   Cognition    Level of Consciousness Alert   Balance   Sitting Balance (Static) Fair   Sitting Balance (Dynamic) Fair -   Standing Balance (Static) Fair -   Standing Balance (Dynamic) Fair -   Weight Shift Sitting Fair   Weight Shift Standing Fair   Skilled Intervention Verbal Cuing;Tactile Cuing;Postural Facilitation;Facilitation   Comments OOB FWW   Bed Mobility    Supine to Sit Moderate Assist   Sit to Supine Moderate Assist   Comments Pt seatd on bedside chair at start of  session, practice bed mobility, returned to bedside chair at end of session.   Activities of Daily Living   Upper Body Dressing Moderate Assist   Lower Body Dressing Maximal Assist   Toileting Maximal Assist   How much help from another person does the patient currently need...   Putting on and taking off regular lower body clothing? 2   Bathing (including washing, rinsing, and drying)? 2   Toileting, which includes using a toilet, bedpan, or urinal? 2   Putting on and taking off regular upper body clothing? 2   Taking care of personal grooming such as brushing teeth? 3   Eating meals? 3   6 Clicks Daily Activity Score 14   Functional Mobility   Sit to Stand Minimal Assist   Bed, Chair, Wheelchair Transfer Minimal Assist   Transfer Method Stand Step   Mobility Min assist FWW   Distance (Feet) 15   # of Times Distance was Traveled 2   Short Term Goals   Short Term Goal # 1 Pt will dress with Chirag in 3 visits   Goal Outcome # 1 Progressing slower than expected   Short Term Goal # 2 pt will groom with CGA and setup standing 8 min at sink in 3 visits   Goal Outcome # 2 Progressing slower than expected   Short Term Goal # 3 Pt will toilet in BR with CGA in 4 vistis   Goal Outcome # 3 Progressing slower than expected   Occupational Therapy Treatment Plan    O.T. Treatment Plan Continue Current Treatment Plan   Anticipated Discharge Equipment and Recommendations   Discharge Recommendations Recommend home health for continued occupational therapy services

## 2024-11-06 NOTE — PROGRESS NOTES
Telemetry Shift Summary     Rhythm: SR/SB  Rate: 50s-60s  Measurements: 0.18/0.06/0.42  Ectopy (reported by Monitor Tech): rare PVC     Normal Values  Rhythm: Sinus  HR:   Measurements: 0.12-0.20/0.06-0.10/0.30-0.52

## 2024-11-06 NOTE — PROGRESS NOTES
Telemetry Shift Summary     Rhythm SR  HR Range 65-70  Ectopy rPVCs  Measurements  .14/.08/.38  Per strip printed 1600     Normal Values  Rhythm SR  HR Range    Measurements 0.12-0.20 / 0.06-0.10  / 0.30-0.52

## 2024-11-06 NOTE — DISCHARGE PLANNING
Case Management Discharge Planning    Admission Date: 10/29/2024  GMLOS: 4  ALOS: 8    6-Clicks ADL Score: 13  6-Clicks Mobility Score: 15    Anticipated Discharge Dispo: Discharge Disposition: D/T to home under HHA care in anticipation of covered skilled care (06)    @0832  Requested DPA send out nebulizer order to [facility].     @1201  Requested DPA have [facility] coordinate nebulizer with group home owner, Steve. Patient discussed during IDT rounds. He'll likely discharge today and will need transport. I will set up transport when discharge order is placed. I spoke to Steve at the group home and he reports he'll be at the home and can also assist patient with his nebulizer treatments.     @1300  Requested transport for 1430.    @1417  Spoke to group home owner, Steve. Informed him transport is set up for 1600.

## 2024-11-06 NOTE — DISCHARGE PLANNING
Per CM SW, DPA placed call [facility] to verify they have nebulizer order. He stated he spoke with Pt and is trying to deliver to home. CM SW stated she would prefer it be delivered to bedside. Transferred call to CM.

## 2024-11-06 NOTE — THERAPY
Occupational Therapy  Daily Treatment     Patient Name: Austin Woodard  Age:  74 y.o., Sex:  male  Medical Record #: [id number]  Today's Date: 11/6/2024     Precautions  Precautions: (P) Fall Risk  Comments: (P) Legaly blind    Assessment    Pt demonstrated Mod assist for bed mobility, Min assist sit/stand, Min assist FWW, Min assist transfers, Mod assist UBCM, Max assist LBCM, Max assist toileting.  Therapist reviewed environmental/safety awareness, fall precautions, AE/DME, ADL's and transfers.    Plan    Treatment Plan Status: (P) Continue Current Treatment Plan  Type of Treatment: Self Care / Activities of Daily Living, Adaptive Equipment, Neuro Re-Education / Balance, Therapeutic Exercises, Therapeutic Activity, Family / Caregiver Training  Treatment Frequency: 3 Times per Week  Treatment Duration: Until Therapy Goals Met    DC Equipment Recommendations: None  Discharge Recommendations: (P) Recommend home health for continued occupational therapy services    Subjective    Pt was drowsy, required tactile/VQ's to initiate/attend/complete task.     Objective       11/06/24 0935    Services   Is patient using  services for this encounter? No   Precautions   Precautions Fall Risk   Comments Legaly blind   Vitals   Pulse Oximetry 92 %   O2 (LPM) 2   O2 Delivery Device Nasal Cannula   Pain   Intervention Declines   Cognition    Level of Consciousness Alert   Balance   Sitting Balance (Static) Fair   Sitting Balance (Dynamic) Fair -   Standing Balance (Static) Fair -   Standing Balance (Dynamic) Fair -   Weight Shift Sitting Fair   Weight Shift Standing Fair   Skilled Intervention Verbal Cuing;Tactile Cuing;Postural Facilitation;Facilitation   Comments OOB FWW   Bed Mobility    Supine to Sit Moderate Assist   Sit to Supine Moderate Assist   Comments Pt seatd on bedside chair at start of session, practice bed mobility, returned to bedside chair at end of session.   Activities of Daily Living   Upper  Body Dressing Moderate Assist   Lower Body Dressing Maximal Assist   Toileting Maximal Assist   How much help from another person does the patient currently need...   Putting on and taking off regular lower body clothing? 2   Bathing (including washing, rinsing, and drying)? 2   Toileting, which includes using a toilet, bedpan, or urinal? 2   Putting on and taking off regular upper body clothing? 2   Taking care of personal grooming such as brushing teeth? 3   Eating meals? 3   6 Clicks Daily Activity Score 14   Functional Mobility   Sit to Stand Minimal Assist   Bed, Chair, Wheelchair Transfer Minimal Assist   Transfer Method Stand Step   Mobility Min assist FWW   Distance (Feet) 15   # of Times Distance was Traveled 2   Short Term Goals   Short Term Goal # 1 Pt will dress with Chirag in 3 visits   Goal Outcome # 1 Progressing slower than expected   Short Term Goal # 2 pt will groom with CGA and setup standing 8 min at sink in 3 visits   Goal Outcome # 2 Progressing slower than expected   Short Term Goal # 3 Pt will toilet in BR with CGA in 4 vistis   Goal Outcome # 3 Progressing slower than expected   Occupational Therapy Treatment Plan    O.T. Treatment Plan Continue Current Treatment Plan   Anticipated Discharge Equipment and Recommendations   Discharge Recommendations Recommend home health for continued occupational therapy services

## 2024-11-06 NOTE — RESPIRATORY CARE
SVN treatment given via mouthpiece.  Peak Flow is 80 lpm.  Pt. Appears weak but he gave his best.  Pt. Is on 2 lpm with a So2 of 96%.

## 2024-11-06 NOTE — PROGRESS NOTES
Discharge instructions reviewed over the phone with pt's caregiver Steve from the group home. All medications and follow-up instructions discussed. Pt's prescriptions delivered to bedside and home O2 at bedside. Per case management, plan for pt to be picked up at 1430 via [employer] transportation.

## 2024-11-07 LAB
BACTERIA SPEC ANAEROBE CULT: NORMAL
SIGNIFICANT IND 70042: NORMAL
SITE SITE: NORMAL
SOURCE SOURCE: NORMAL

## 2024-11-11 LAB
FUNGUS SPEC CULT: NORMAL
FUNGUS SPEC FUNGUS STN: NORMAL
SIGNIFICANT IND 70042: NORMAL
SITE SITE: NORMAL
SOURCE SOURCE: NORMAL

## 2024-12-15 LAB
MYCOBACTERIUM SPEC CULT: NORMAL
RHODAMINE-AURAMINE STN SPEC: NORMAL
SIGNIFICANT IND 70042: NORMAL
SITE SITE: NORMAL
SOURCE SOURCE: NORMAL

## 2025-01-16 ENCOUNTER — HOSPITAL ENCOUNTER (OUTPATIENT)
Dept: RADIOLOGY | Facility: MEDICAL CENTER | Age: 75
End: 2025-01-16
Attending: STUDENT IN AN ORGANIZED HEALTH CARE EDUCATION/TRAINING PROGRAM
Payer: MEDICARE

## 2025-01-16 DIAGNOSIS — J90 EXUDATIVE PLEURISY: ICD-10-CM

## 2025-01-16 PROCEDURE — 71046 X-RAY EXAM CHEST 2 VIEWS: CPT

## 2025-04-25 NOTE — CARE PLAN
Respiratory Update    Treatment modality: SVN  Frequency:Q6wa    Pt tolerating current treatments well with no adverse reactions. Bipap/Cpap mask placed.  Can the patient demonstrate removal of mask? yes  If no, please notify physician.   Medication Requested:    Disp Refills Start End     amphetamine-dextroamphetamine (Adderall) 20 MG tablet 30 tablet 0 4/5/2025 --    Sig - Route: Take 1 tablet by mouth daily. Indications: Attention Deficit Hyperactivity Disorder, F90.0 Begin taking on April 5, 2025. - Oral        Last Rx dispensed (per PDMP): 4-5-25    Last seen: 3-27-25  Follow up: rtc  No show/cancelled appt: none  Upcoming appt: 6/10/2025     Controlled Med - Routed to Provider due to NO PROTOCOL. Orders have been prepped according to providers note.

## 2025-08-17 ENCOUNTER — HOSPITAL ENCOUNTER (INPATIENT)
Facility: MEDICAL CENTER | Age: 75
End: 2025-08-17
Attending: EMERGENCY MEDICINE | Admitting: INTERNAL MEDICINE
Payer: COMMERCIAL

## 2025-08-17 ENCOUNTER — APPOINTMENT (OUTPATIENT)
Dept: RADIOLOGY | Facility: MEDICAL CENTER | Age: 75
End: 2025-08-17
Attending: EMERGENCY MEDICINE
Payer: COMMERCIAL

## 2025-08-17 DIAGNOSIS — R57.8 HEMORRHAGIC SHOCK (HCC): ICD-10-CM

## 2025-08-17 DIAGNOSIS — I48.92 ATRIAL FLUTTER, UNSPECIFIED TYPE (HCC): ICD-10-CM

## 2025-08-17 DIAGNOSIS — J86.9 EMPYEMA OF LEFT PLEURAL SPACE (HCC): ICD-10-CM

## 2025-08-17 DIAGNOSIS — G93.40 ENCEPHALOPATHY ACUTE: ICD-10-CM

## 2025-08-17 DIAGNOSIS — J90 PLEURAL EFFUSION: ICD-10-CM

## 2025-08-17 DIAGNOSIS — D72.829 LEUKOCYTOSIS, UNSPECIFIED TYPE: ICD-10-CM

## 2025-08-17 DIAGNOSIS — A41.9 SEPSIS, DUE TO UNSPECIFIED ORGANISM, UNSPECIFIED WHETHER ACUTE ORGAN DYSFUNCTION PRESENT (HCC): Primary | ICD-10-CM

## 2025-08-17 PROBLEM — Z78.9 FULL CODE STATUS: Status: ACTIVE | Noted: 2024-10-30

## 2025-08-17 PROBLEM — K05.219 PERIODONTAL ABSCESS: Status: ACTIVE | Noted: 2025-08-17

## 2025-08-17 PROBLEM — R65.20 SEPSIS WITH ACUTE RENAL FAILURE WITHOUT SEPTIC SHOCK (HCC): Status: ACTIVE | Noted: 2025-08-17

## 2025-08-17 PROBLEM — K04.7 PERIAPICAL ABSCESS: Status: ACTIVE | Noted: 2025-08-17

## 2025-08-17 PROBLEM — I48.91 ATRIAL FIBRILLATION WITH RVR (HCC): Status: ACTIVE | Noted: 2025-08-17

## 2025-08-17 PROBLEM — N17.9 ACUTE ON CHRONIC KIDNEY FAILURE (HCC): Status: ACTIVE | Noted: 2021-06-20

## 2025-08-17 PROBLEM — F33.9 MAJOR DEPRESSIVE DISORDER, RECURRENT, WITH CATATONIC FEATURES (HCC): Status: ACTIVE | Noted: 2025-08-17

## 2025-08-17 PROBLEM — N17.9 SEPSIS WITH ACUTE RENAL FAILURE WITHOUT SEPTIC SHOCK (HCC): Status: ACTIVE | Noted: 2025-08-17

## 2025-08-17 PROBLEM — F06.1 MAJOR DEPRESSIVE DISORDER, RECURRENT, WITH CATATONIC FEATURES (HCC): Status: ACTIVE | Noted: 2025-08-17

## 2025-08-17 LAB
ALBUMIN SERPL BCP-MCNC: 3.2 G/DL (ref 3.2–4.9)
ALBUMIN/GLOB SERPL: 0.8 G/DL
ALP SERPL-CCNC: 125 U/L (ref 30–99)
ALT SERPL-CCNC: 27 U/L (ref 2–50)
AMYLASE FLD-CCNC: 20 U/L
ANION GAP SERPL CALC-SCNC: 12 MMOL/L (ref 7–16)
APPEARANCE FLD: NORMAL
APPEARANCE UR: CLEAR
AST SERPL-CCNC: 24 U/L (ref 12–45)
BACTERIA #/AREA URNS HPF: ABNORMAL /HPF
BASOPHILS # BLD AUTO: 0.2 % (ref 0–1.8)
BASOPHILS # BLD: 0.03 K/UL (ref 0–0.12)
BILIRUB SERPL-MCNC: 3 MG/DL (ref 0.1–1.5)
BILIRUB UR QL STRIP.AUTO: ABNORMAL
BODY FLD TYPE: NORMAL
BUN SERPL-MCNC: 23 MG/DL (ref 8–22)
CALCIUM ALBUM COR SERPL-MCNC: 9.7 MG/DL (ref 8.5–10.5)
CALCIUM SERPL-MCNC: 9.1 MG/DL (ref 8.5–10.5)
CASTS URNS QL MICRO: ABNORMAL /LPF (ref 0–2)
CHLORIDE SERPL-SCNC: 103 MMOL/L (ref 96–112)
CO2 SERPL-SCNC: 21 MMOL/L (ref 20–33)
COLOR FLD: YELLOW
COLOR UR: ABNORMAL
CREAT SERPL-MCNC: 1.57 MG/DL (ref 0.5–1.4)
CSF COMMENTS 1658: NORMAL
CYTOLOGY REG CYTOL: NORMAL
EKG IMPRESSION: NORMAL
EOSINOPHIL # BLD AUTO: 0.02 K/UL (ref 0–0.51)
EOSINOPHIL NFR BLD: 0.1 % (ref 0–6.9)
EPITHELIAL CELLS 1715: ABNORMAL /HPF (ref 0–5)
ERYTHROCYTE [DISTWIDTH] IN BLOOD BY AUTOMATED COUNT: 51.8 FL (ref 35.9–50)
FLUAV RNA SPEC QL NAA+PROBE: NEGATIVE
FLUBV RNA SPEC QL NAA+PROBE: NEGATIVE
FUNGUS SPEC FUNGUS STN: NORMAL
GFR SERPLBLD CREATININE-BSD FMLA CKD-EPI: 46 ML/MIN/1.73 M 2
GLOBULIN SER CALC-MCNC: 3.8 G/DL (ref 1.9–3.5)
GLUCOSE FLD-MCNC: <2 MG/DL
GLUCOSE SERPL-MCNC: 226 MG/DL (ref 65–99)
GLUCOSE UR STRIP.AUTO-MCNC: NEGATIVE MG/DL
GRAM STN SPEC: NORMAL
HCT VFR BLD AUTO: 35 % (ref 42–52)
HGB BLD-MCNC: 11.6 G/DL (ref 14–18)
IMM GRANULOCYTES # BLD AUTO: 0.12 K/UL (ref 0–0.11)
IMM GRANULOCYTES NFR BLD AUTO: 0.7 % (ref 0–0.9)
INR PPP: 1.67 (ref 0.87–1.13)
KETONES UR STRIP.AUTO-MCNC: NEGATIVE MG/DL
LACTATE SERPL-SCNC: 1.9 MMOL/L (ref 0.5–2)
LACTATE SERPL-SCNC: 2 MMOL/L (ref 0.5–2)
LACTATE SERPL-SCNC: 2.2 MMOL/L (ref 0.5–2)
LDH FLD L TO P-CCNC: 118 U/L
LEUKOCYTE ESTERASE UR QL STRIP.AUTO: ABNORMAL
LIPASE SERPL-CCNC: 50 U/L (ref 11–82)
LYMPHOCYTES # BLD AUTO: 0.9 K/UL (ref 1–4.8)
LYMPHOCYTES NFR BLD: 5.1 % (ref 22–41)
MCH RBC QN AUTO: 28.9 PG (ref 27–33)
MCHC RBC AUTO-ENTMCNC: 33.1 G/DL (ref 32.3–36.5)
MCV RBC AUTO: 87.3 FL (ref 81.4–97.8)
MICRO URNS: ABNORMAL
MONOCYTES # BLD AUTO: 1.15 K/UL (ref 0–0.85)
MONOCYTES NFR BLD AUTO: 6.5 % (ref 0–13.4)
NEUTROPHILS # BLD AUTO: 15.38 K/UL (ref 1.82–7.42)
NEUTROPHILS NFR BLD: 87.4 % (ref 44–72)
NITRITE UR QL STRIP.AUTO: NEGATIVE
NRBC # BLD AUTO: 0 K/UL
NRBC BLD-RTO: 0 /100 WBC (ref 0–0.2)
NUC CELL # FLD: 1122 CELLS/UL
PH FLD: 7 [PH]
PH UR STRIP.AUTO: 7.5 [PH] (ref 5–8)
PLATELET # BLD AUTO: 118 K/UL (ref 164–446)
PMV BLD AUTO: 9.4 FL (ref 9–12.9)
POTASSIUM SERPL-SCNC: 3.9 MMOL/L (ref 3.6–5.5)
PROCALCITONIN SERPL-MCNC: 3.65 NG/ML
PROT FLD-MCNC: 3.2 G/DL
PROT SERPL-MCNC: 7 G/DL (ref 6–8.2)
PROT UR QL STRIP: 300 MG/DL
PROTHROMBIN TIME: 19.9 SEC (ref 12–14.6)
RBC # BLD AUTO: 4.01 M/UL (ref 4.7–6.1)
RBC # FLD: <2000 CELLS/UL
RBC # URNS HPF: ABNORMAL /HPF (ref 0–2)
RBC UR QL AUTO: ABNORMAL
RSV RNA SPEC QL NAA+PROBE: NEGATIVE
SARS-COV-2 RNA RESP QL NAA+PROBE: NEGATIVE
SCCMEC + MECA PNL NOSE NAA+PROBE: NEGATIVE
SIGNIFICANT IND 70042: NORMAL
SIGNIFICANT IND 70042: NORMAL
SITE SITE: NORMAL
SITE SITE: NORMAL
SODIUM SERPL-SCNC: 136 MMOL/L (ref 135–145)
SOURCE SOURCE: NORMAL
SOURCE SOURCE: NORMAL
SP GR UR STRIP.AUTO: 1.02
UROBILINOGEN UR STRIP.AUTO-MCNC: >=8 EU/DL
WBC # BLD AUTO: 17.6 K/UL (ref 4.8–10.8)
WBC #/AREA URNS HPF: ABNORMAL /HPF

## 2025-08-17 PROCEDURE — 700111 HCHG RX REV CODE 636 W/ 250 OVERRIDE (IP): Performed by: INTERNAL MEDICINE

## 2025-08-17 PROCEDURE — 99292 CRITICAL CARE ADDL 30 MIN: CPT | Mod: 25 | Performed by: INTERNAL MEDICINE

## 2025-08-17 PROCEDURE — 87040 BLOOD CULTURE FOR BACTERIA: CPT

## 2025-08-17 PROCEDURE — 85025 COMPLETE CBC W/AUTO DIFF WBC: CPT

## 2025-08-17 PROCEDURE — 85610 PROTHROMBIN TIME: CPT

## 2025-08-17 PROCEDURE — 87637 SARSCOV2&INF A&B&RSV AMP PRB: CPT | Performed by: EMERGENCY MEDICINE

## 2025-08-17 PROCEDURE — 83986 ASSAY PH BODY FLUID NOS: CPT

## 2025-08-17 PROCEDURE — 83615 LACTATE (LD) (LDH) ENZYME: CPT

## 2025-08-17 PROCEDURE — 87102 FUNGUS ISOLATION CULTURE: CPT

## 2025-08-17 PROCEDURE — 87070 CULTURE OTHR SPECIMN AEROBIC: CPT

## 2025-08-17 PROCEDURE — 99291 CRITICAL CARE FIRST HOUR: CPT

## 2025-08-17 PROCEDURE — 82945 GLUCOSE OTHER FLUID: CPT

## 2025-08-17 PROCEDURE — 96368 THER/DIAG CONCURRENT INF: CPT

## 2025-08-17 PROCEDURE — 99291 CRITICAL CARE FIRST HOUR: CPT | Mod: 25 | Performed by: INTERNAL MEDICINE

## 2025-08-17 PROCEDURE — 93005 ELECTROCARDIOGRAM TRACING: CPT | Mod: TC | Performed by: EMERGENCY MEDICINE

## 2025-08-17 PROCEDURE — 700105 HCHG RX REV CODE 258: Performed by: INTERNAL MEDICINE

## 2025-08-17 PROCEDURE — 89051 BODY FLUID CELL COUNT: CPT

## 2025-08-17 PROCEDURE — 87641 MR-STAPH DNA AMP PROBE: CPT

## 2025-08-17 PROCEDURE — 81001 URINALYSIS AUTO W/SCOPE: CPT

## 2025-08-17 PROCEDURE — 36415 COLL VENOUS BLD VENIPUNCTURE: CPT

## 2025-08-17 PROCEDURE — 83605 ASSAY OF LACTIC ACID: CPT | Mod: 91

## 2025-08-17 PROCEDURE — 88112 CYTOPATH CELL ENHANCE TECH: CPT | Performed by: STUDENT IN AN ORGANIZED HEALTH CARE EDUCATION/TRAINING PROGRAM

## 2025-08-17 PROCEDURE — 83690 ASSAY OF LIPASE: CPT

## 2025-08-17 PROCEDURE — 770022 HCHG ROOM/CARE - ICU (200)

## 2025-08-17 PROCEDURE — 71045 X-RAY EXAM CHEST 1 VIEW: CPT

## 2025-08-17 PROCEDURE — 96365 THER/PROPH/DIAG IV INF INIT: CPT

## 2025-08-17 PROCEDURE — A9270 NON-COVERED ITEM OR SERVICE: HCPCS | Performed by: INTERNAL MEDICINE

## 2025-08-17 PROCEDURE — 32555 ASPIRATE PLEURA W/ IMAGING: CPT | Performed by: INTERNAL MEDICINE

## 2025-08-17 PROCEDURE — 700111 HCHG RX REV CODE 636 W/ 250 OVERRIDE (IP): Mod: JZ | Performed by: EMERGENCY MEDICINE

## 2025-08-17 PROCEDURE — 87086 URINE CULTURE/COLONY COUNT: CPT

## 2025-08-17 PROCEDURE — 82150 ASSAY OF AMYLASE: CPT

## 2025-08-17 PROCEDURE — 87205 SMEAR GRAM STAIN: CPT | Mod: 91

## 2025-08-17 PROCEDURE — 87116 MYCOBACTERIA CULTURE: CPT

## 2025-08-17 PROCEDURE — 700102 HCHG RX REV CODE 250 W/ 637 OVERRIDE(OP): Performed by: INTERNAL MEDICINE

## 2025-08-17 PROCEDURE — 87015 SPECIMEN INFECT AGNT CONCNTJ: CPT

## 2025-08-17 PROCEDURE — 96375 TX/PRO/DX INJ NEW DRUG ADDON: CPT

## 2025-08-17 PROCEDURE — 87077 CULTURE AEROBIC IDENTIFY: CPT | Mod: 91

## 2025-08-17 PROCEDURE — 88305 TISSUE EXAM BY PATHOLOGIST: CPT | Performed by: STUDENT IN AN ORGANIZED HEALTH CARE EDUCATION/TRAINING PROGRAM

## 2025-08-17 PROCEDURE — 80053 COMPREHEN METABOLIC PANEL: CPT

## 2025-08-17 PROCEDURE — 700105 HCHG RX REV CODE 258: Performed by: EMERGENCY MEDICINE

## 2025-08-17 PROCEDURE — 84157 ASSAY OF PROTEIN OTHER: CPT

## 2025-08-17 PROCEDURE — 87181 SC STD AGAR DILUTION PER AGT: CPT | Mod: 91

## 2025-08-17 PROCEDURE — 87184 SC STD DISK METHOD PER PLATE: CPT

## 2025-08-17 PROCEDURE — 84145 PROCALCITONIN (PCT): CPT

## 2025-08-17 RX ORDER — ACETAMINOPHEN 10 MG/ML
1000 INJECTION, SOLUTION INTRAVENOUS ONCE
Status: COMPLETED | OUTPATIENT
Start: 2025-08-17 | End: 2025-08-17

## 2025-08-17 RX ORDER — ALLOPURINOL 300 MG/1
300 TABLET ORAL DAILY
Status: DISCONTINUED | OUTPATIENT
Start: 2025-08-18 | End: 2025-08-25

## 2025-08-17 RX ORDER — ENOXAPARIN SODIUM 100 MG/ML
100 INJECTION SUBCUTANEOUS EVERY 12 HOURS
Status: DISCONTINUED | OUTPATIENT
Start: 2025-08-17 | End: 2025-08-17

## 2025-08-17 RX ORDER — CEFTRIAXONE 2 G/1
2000 INJECTION, POWDER, FOR SOLUTION INTRAMUSCULAR; INTRAVENOUS ONCE
Status: COMPLETED | OUTPATIENT
Start: 2025-08-17 | End: 2025-08-17

## 2025-08-17 RX ORDER — HEPARIN SODIUM 5000 [USP'U]/ML
5000 INJECTION, SOLUTION INTRAVENOUS; SUBCUTANEOUS EVERY 12 HOURS
Status: DISCONTINUED | OUTPATIENT
Start: 2025-08-17 | End: 2025-08-17

## 2025-08-17 RX ORDER — CARBOXYMETHYLCELLULOSE SODIUM 5 MG/ML
1 SOLUTION/ DROPS OPHTHALMIC PRN
Status: DISPENSED | OUTPATIENT
Start: 2025-08-17

## 2025-08-17 RX ORDER — ARIPIPRAZOLE 10 MG/1
10 TABLET ORAL DAILY
Status: DISCONTINUED | OUTPATIENT
Start: 2025-08-18 | End: 2025-08-25

## 2025-08-17 RX ORDER — IBUPROFEN 600 MG/1
600 TABLET, FILM COATED ORAL EVERY 6 HOURS PRN
Status: ON HOLD | COMMUNITY

## 2025-08-17 RX ORDER — LATANOPROST 50 UG/ML
1 SOLUTION/ DROPS OPHTHALMIC NIGHTLY
Status: DISPENSED | OUTPATIENT
Start: 2025-08-17

## 2025-08-17 RX ORDER — POLYVINYL ALCOHOL 14 MG/ML
1 SOLUTION/ DROPS OPHTHALMIC PRN
Status: DISCONTINUED | OUTPATIENT
Start: 2025-08-17 | End: 2025-08-17

## 2025-08-17 RX ORDER — METOPROLOL TARTRATE 1 MG/ML
2.5 INJECTION, SOLUTION INTRAVENOUS EVERY 4 HOURS PRN
Status: DISCONTINUED | OUTPATIENT
Start: 2025-08-17 | End: 2025-08-18

## 2025-08-17 RX ORDER — ADENOSINE 3 MG/ML
6 INJECTION, SOLUTION INTRAVENOUS ONCE
Status: COMPLETED | OUTPATIENT
Start: 2025-08-17 | End: 2025-08-17

## 2025-08-17 RX ORDER — SODIUM CHLORIDE, SODIUM LACTATE, POTASSIUM CHLORIDE, AND CALCIUM CHLORIDE .6; .31; .03; .02 G/100ML; G/100ML; G/100ML; G/100ML
30 INJECTION, SOLUTION INTRAVENOUS ONCE
Status: COMPLETED | OUTPATIENT
Start: 2025-08-17 | End: 2025-08-17

## 2025-08-17 RX ORDER — ENOXAPARIN SODIUM 100 MG/ML
100 INJECTION SUBCUTANEOUS EVERY 12 HOURS
Status: DISCONTINUED | OUTPATIENT
Start: 2025-08-17 | End: 2025-08-26

## 2025-08-17 RX ORDER — TAMSULOSIN HYDROCHLORIDE 0.4 MG/1
0.4 CAPSULE ORAL
Status: DISCONTINUED | OUTPATIENT
Start: 2025-08-18 | End: 2025-08-25

## 2025-08-17 RX ORDER — DORZOLAMIDE HYDROCHLORIDE AND TIMOLOL MALEATE 20; 5 MG/ML; MG/ML
1 SOLUTION/ DROPS OPHTHALMIC 2 TIMES DAILY
Status: DISPENSED | OUTPATIENT
Start: 2025-08-17

## 2025-08-17 RX ORDER — DEXTROSE MONOHYDRATE 50 MG/ML
INJECTION, SOLUTION INTRAVENOUS CONTINUOUS
Status: DISCONTINUED | OUTPATIENT
Start: 2025-08-17 | End: 2025-08-21

## 2025-08-17 RX ORDER — ASPIRIN 81 MG/1
81 TABLET, CHEWABLE ORAL DAILY
Status: DISCONTINUED | OUTPATIENT
Start: 2025-08-18 | End: 2025-08-25

## 2025-08-17 RX ADMIN — AMIODARONE HYDROCHLORIDE 150 MG: 1.5 INJECTION, SOLUTION INTRAVENOUS at 11:57

## 2025-08-17 RX ADMIN — ENOXAPARIN SODIUM 100 MG: 100 INJECTION SUBCUTANEOUS at 17:53

## 2025-08-17 RX ADMIN — ADENOSINE 6 MG: 3 INJECTION INTRAVENOUS at 09:16

## 2025-08-17 RX ADMIN — CEFTRIAXONE SODIUM 2000 MG: 2 INJECTION, POWDER, FOR SOLUTION INTRAMUSCULAR; INTRAVENOUS at 08:59

## 2025-08-17 RX ADMIN — ACETAMINOPHEN 1000 MG: 1000 INJECTION INTRAVENOUS at 10:15

## 2025-08-17 RX ADMIN — PIPERACILLIN AND TAZOBACTAM 4.5 G: 4; .5 INJECTION, POWDER, FOR SOLUTION INTRAVENOUS at 10:37

## 2025-08-17 RX ADMIN — AMIODARONE HYDROCHLORIDE 0.5 MG/MIN: 1.8 INJECTION, SOLUTION INTRAVENOUS at 18:15

## 2025-08-17 RX ADMIN — HEPARIN SODIUM 5000 UNITS: 5000 INJECTION, SOLUTION INTRAVENOUS; SUBCUTANEOUS at 12:12

## 2025-08-17 RX ADMIN — PIPERACILLIN AND TAZOBACTAM 4.5 G: 4; .5 INJECTION, POWDER, FOR SOLUTION INTRAVENOUS at 13:54

## 2025-08-17 RX ADMIN — PIPERACILLIN AND TAZOBACTAM 4.5 G: 4; .5 INJECTION, POWDER, FOR SOLUTION INTRAVENOUS at 20:59

## 2025-08-17 RX ADMIN — DEXTROSE MONOHYDRATE: 50 INJECTION, SOLUTION INTRAVENOUS at 12:09

## 2025-08-17 RX ADMIN — VANCOMYCIN HYDROCHLORIDE 2500 MG: 5 INJECTION, POWDER, LYOPHILIZED, FOR SOLUTION INTRAVENOUS at 12:50

## 2025-08-17 RX ADMIN — DORZOLAMIDE HYDROCHLORIDE AND TIMOLOL MALEATE 1 DROP: 20; 5 SOLUTION/ DROPS OPHTHALMIC at 13:54

## 2025-08-17 RX ADMIN — AMIODARONE HYDROCHLORIDE 1 MG/MIN: 1.8 INJECTION, SOLUTION INTRAVENOUS at 12:08

## 2025-08-17 RX ADMIN — SODIUM CHLORIDE, POTASSIUM CHLORIDE, SODIUM LACTATE AND CALCIUM CHLORIDE 2052 ML: 600; 310; 30; 20 INJECTION, SOLUTION INTRAVENOUS at 08:43

## 2025-08-17 RX ADMIN — VIBEGRON 75 MG: 75 TABLET, FILM COATED ORAL at 17:53

## 2025-08-17 RX ADMIN — LATANOPROST 1 DROP: 50 SOLUTION OPHTHALMIC at 20:59

## 2025-08-17 ASSESSMENT — LIFESTYLE VARIABLES
ON A TYPICAL DAY WHEN YOU DRINK ALCOHOL HOW MANY DRINKS DO YOU HAVE: 0
TOTAL SCORE: 0
EVER FELT BAD OR GUILTY ABOUT YOUR DRINKING: NO
HAVE YOU EVER FELT YOU SHOULD CUT DOWN ON YOUR DRINKING: NO
EVER HAD A DRINK FIRST THING IN THE MORNING TO STEADY YOUR NERVES TO GET RID OF A HANGOVER: NO
TOTAL SCORE: 0
AVERAGE NUMBER OF DAYS PER WEEK YOU HAVE A DRINK CONTAINING ALCOHOL: 0
HOW MANY TIMES IN THE PAST YEAR HAVE YOU HAD 5 OR MORE DRINKS IN A DAY: 0
HAVE PEOPLE ANNOYED YOU BY CRITICIZING YOUR DRINKING: NO
TOTAL SCORE: 0
ALCOHOL_USE: NO
CONSUMPTION TOTAL: NEGATIVE

## 2025-08-17 ASSESSMENT — COGNITIVE AND FUNCTIONAL STATUS - GENERAL
TOILETING: TOTAL
TOILETING: TOTAL
STANDING UP FROM CHAIR USING ARMS: TOTAL
MOBILITY SCORE: 12
HELP NEEDED FOR BATHING: A LOT
DRESSING REGULAR UPPER BODY CLOTHING: TOTAL
WALKING IN HOSPITAL ROOM: TOTAL
HELP NEEDED FOR BATHING: TOTAL
TOILETING: A LOT
PERSONAL GROOMING: A LOT
WALKING IN HOSPITAL ROOM: TOTAL
CLIMB 3 TO 5 STEPS WITH RAILING: TOTAL
SUGGESTED CMS G CODE MODIFIER MOBILITY: CN
MOVING FROM LYING ON BACK TO SITTING ON SIDE OF FLAT BED: TOTAL
SUGGESTED CMS G CODE MODIFIER DAILY ACTIVITY: CN
MOVING FROM LYING ON BACK TO SITTING ON SIDE OF FLAT BED: A LITTLE
EATING MEALS: A LOT
WALKING IN HOSPITAL ROOM: TOTAL
MOBILITY SCORE: 6
DRESSING REGULAR LOWER BODY CLOTHING: TOTAL
DAILY ACTIVITIY SCORE: 6
SUGGESTED CMS G CODE MODIFIER MOBILITY: CL
STANDING UP FROM CHAIR USING ARMS: TOTAL
MOVING FROM LYING ON BACK TO SITTING ON SIDE OF FLAT BED: TOTAL
TURNING FROM BACK TO SIDE WHILE IN FLAT BAD: TOTAL
MOVING TO AND FROM BED TO CHAIR: TOTAL
DRESSING REGULAR LOWER BODY CLOTHING: A LITTLE
DRESSING REGULAR LOWER BODY CLOTHING: TOTAL
STANDING UP FROM CHAIR USING ARMS: A LOT
TURNING FROM BACK TO SIDE WHILE IN FLAT BAD: A LITTLE
PERSONAL GROOMING: TOTAL
SUGGESTED CMS G CODE MODIFIER DAILY ACTIVITY: CN
SUGGESTED CMS G CODE MODIFIER DAILY ACTIVITY: CK
EATING MEALS: TOTAL
MOVING TO AND FROM BED TO CHAIR: A LOT
DRESSING REGULAR UPPER BODY CLOTHING: A LITTLE
HELP NEEDED FOR BATHING: TOTAL
DAILY ACTIVITIY SCORE: 6
CLIMB 3 TO 5 STEPS WITH RAILING: TOTAL
DRESSING REGULAR UPPER BODY CLOTHING: TOTAL
TURNING FROM BACK TO SIDE WHILE IN FLAT BAD: TOTAL
EATING MEALS: TOTAL
DAILY ACTIVITIY SCORE: 14
PERSONAL GROOMING: TOTAL
MOVING TO AND FROM BED TO CHAIR: TOTAL

## 2025-08-17 ASSESSMENT — ENCOUNTER SYMPTOMS
WHEEZING: 0
CONSTIPATION: 0
ABDOMINAL PAIN: 0
SHORTNESS OF BREATH: 0
DIARRHEA: 0

## 2025-08-17 ASSESSMENT — FIBROSIS 4 INDEX: FIB4 SCORE: 4.7

## 2025-08-17 ASSESSMENT — PAIN DESCRIPTION - PAIN TYPE
TYPE: ACUTE PAIN
TYPE: ACUTE PAIN;CHRONIC PAIN

## 2025-08-18 PROBLEM — A41.89 SEVERE SEPSIS WITH ACUTE ORGAN DYSFUNCTION DUE TO GRAM POSITIVE BACTERIA (HCC): Status: ACTIVE | Noted: 2025-08-17

## 2025-08-18 PROBLEM — J86.9 EMPYEMA OF LEFT PLEURAL SPACE (HCC): Status: ACTIVE | Noted: 2022-06-22

## 2025-08-18 LAB
ALBUMIN SERPL BCP-MCNC: 2.7 G/DL (ref 3.2–4.9)
ALBUMIN/GLOB SERPL: 0.7 G/DL
ALP SERPL-CCNC: 93 U/L (ref 30–99)
ALT SERPL-CCNC: 19 U/L (ref 2–50)
ANION GAP SERPL CALC-SCNC: 10 MMOL/L (ref 7–16)
AST SERPL-CCNC: 21 U/L (ref 12–45)
BASOPHILS # BLD AUTO: 0.5 % (ref 0–1.8)
BASOPHILS # BLD: 0.06 K/UL (ref 0–0.12)
BILIRUB SERPL-MCNC: 1.7 MG/DL (ref 0.1–1.5)
BUN SERPL-MCNC: 23 MG/DL (ref 8–22)
CALCIUM ALBUM COR SERPL-MCNC: 9.7 MG/DL (ref 8.5–10.5)
CALCIUM SERPL-MCNC: 8.7 MG/DL (ref 8.5–10.5)
CHLORIDE SERPL-SCNC: 104 MMOL/L (ref 96–112)
CO2 SERPL-SCNC: 21 MMOL/L (ref 20–33)
CREAT SERPL-MCNC: 1.44 MG/DL (ref 0.5–1.4)
EOSINOPHIL # BLD AUTO: 0.08 K/UL (ref 0–0.51)
EOSINOPHIL NFR BLD: 0.6 % (ref 0–6.9)
ERYTHROCYTE [DISTWIDTH] IN BLOOD BY AUTOMATED COUNT: 51.1 FL (ref 35.9–50)
GFR SERPLBLD CREATININE-BSD FMLA CKD-EPI: 51 ML/MIN/1.73 M 2
GLOBULIN SER CALC-MCNC: 3.7 G/DL (ref 1.9–3.5)
GLUCOSE SERPL-MCNC: 150 MG/DL (ref 65–99)
HCT VFR BLD AUTO: 31.6 % (ref 42–52)
HGB BLD-MCNC: 10.4 G/DL (ref 14–18)
IMM GRANULOCYTES # BLD AUTO: 0.13 K/UL (ref 0–0.11)
IMM GRANULOCYTES NFR BLD AUTO: 1 % (ref 0–0.9)
LYMPHOCYTES # BLD AUTO: 0.66 K/UL (ref 1–4.8)
LYMPHOCYTES NFR BLD: 5.2 % (ref 22–41)
MAGNESIUM SERPL-MCNC: 2.1 MG/DL (ref 1.5–2.5)
MCH RBC QN AUTO: 28 PG (ref 27–33)
MCHC RBC AUTO-ENTMCNC: 32.9 G/DL (ref 32.3–36.5)
MCV RBC AUTO: 84.9 FL (ref 81.4–97.8)
MONOCYTES # BLD AUTO: 0.76 K/UL (ref 0–0.85)
MONOCYTES NFR BLD AUTO: 6 % (ref 0–13.4)
NEUTROPHILS # BLD AUTO: 11.06 K/UL (ref 1.82–7.42)
NEUTROPHILS NFR BLD: 86.7 % (ref 44–72)
NRBC # BLD AUTO: 0 K/UL
NRBC BLD-RTO: 0 /100 WBC (ref 0–0.2)
PHOSPHATE SERPL-MCNC: 2.3 MG/DL (ref 2.5–4.5)
PLATELET # BLD AUTO: 115 K/UL (ref 164–446)
PMV BLD AUTO: 9.5 FL (ref 9–12.9)
POTASSIUM SERPL-SCNC: 3.6 MMOL/L (ref 3.6–5.5)
PROT SERPL-MCNC: 6.4 G/DL (ref 6–8.2)
RBC # BLD AUTO: 3.72 M/UL (ref 4.7–6.1)
SODIUM SERPL-SCNC: 135 MMOL/L (ref 135–145)
WBC # BLD AUTO: 12.8 K/UL (ref 4.8–10.8)

## 2025-08-18 PROCEDURE — 83735 ASSAY OF MAGNESIUM: CPT

## 2025-08-18 PROCEDURE — 770020 HCHG ROOM/CARE - TELE (206)

## 2025-08-18 PROCEDURE — 85025 COMPLETE CBC W/AUTO DIFF WBC: CPT

## 2025-08-18 PROCEDURE — 84100 ASSAY OF PHOSPHORUS: CPT

## 2025-08-18 PROCEDURE — 99223 1ST HOSP IP/OBS HIGH 75: CPT | Performed by: INTERNAL MEDICINE

## 2025-08-18 PROCEDURE — 700105 HCHG RX REV CODE 258: Performed by: INTERNAL MEDICINE

## 2025-08-18 PROCEDURE — 92612 ENDOSCOPY SWALLOW (FEES) VID: CPT

## 2025-08-18 PROCEDURE — 80053 COMPREHEN METABOLIC PANEL: CPT

## 2025-08-18 PROCEDURE — 92610 EVALUATE SWALLOWING FUNCTION: CPT

## 2025-08-18 PROCEDURE — A9270 NON-COVERED ITEM OR SERVICE: HCPCS

## 2025-08-18 PROCEDURE — 700102 HCHG RX REV CODE 250 W/ 637 OVERRIDE(OP)

## 2025-08-18 PROCEDURE — 93005 ELECTROCARDIOGRAM TRACING: CPT | Mod: TC | Performed by: INTERNAL MEDICINE

## 2025-08-18 PROCEDURE — 700105 HCHG RX REV CODE 258: Performed by: STUDENT IN AN ORGANIZED HEALTH CARE EDUCATION/TRAINING PROGRAM

## 2025-08-18 PROCEDURE — 700102 HCHG RX REV CODE 250 W/ 637 OVERRIDE(OP): Performed by: INTERNAL MEDICINE

## 2025-08-18 PROCEDURE — 700111 HCHG RX REV CODE 636 W/ 250 OVERRIDE (IP): Mod: JZ | Performed by: INTERNAL MEDICINE

## 2025-08-18 PROCEDURE — A9270 NON-COVERED ITEM OR SERVICE: HCPCS | Performed by: INTERNAL MEDICINE

## 2025-08-18 PROCEDURE — 700101 HCHG RX REV CODE 250: Performed by: STUDENT IN AN ORGANIZED HEALTH CARE EDUCATION/TRAINING PROGRAM

## 2025-08-18 PROCEDURE — 99233 SBSQ HOSP IP/OBS HIGH 50: CPT | Performed by: STUDENT IN AN ORGANIZED HEALTH CARE EDUCATION/TRAINING PROGRAM

## 2025-08-18 RX ORDER — FAMOTIDINE 20 MG/1
20 TABLET, FILM COATED ORAL DAILY
Status: DISCONTINUED | OUTPATIENT
Start: 2025-08-18 | End: 2025-08-25

## 2025-08-18 RX ADMIN — AMIODARONE HYDROCHLORIDE 0.5 MG/MIN: 1.8 INJECTION, SOLUTION INTRAVENOUS at 05:53

## 2025-08-18 RX ADMIN — VIBEGRON 75 MG: 75 TABLET, FILM COATED ORAL at 20:43

## 2025-08-18 RX ADMIN — DEXTROSE MONOHYDRATE: 50 INJECTION, SOLUTION INTRAVENOUS at 04:55

## 2025-08-18 RX ADMIN — DEXTROSE MONOHYDRATE: 50 INJECTION, SOLUTION INTRAVENOUS at 20:47

## 2025-08-18 RX ADMIN — POTASSIUM PHOSPHATE, MONOBASIC POTASSIUM PHOSPHATE, DIBASIC INJECTION, 15 MMOL: 236; 224 SOLUTION, CONCENTRATE INTRAVENOUS at 08:30

## 2025-08-18 RX ADMIN — ENOXAPARIN SODIUM 100 MG: 100 INJECTION SUBCUTANEOUS at 06:12

## 2025-08-18 RX ADMIN — AMPICILLIN AND SULBACTAM 3 G: 1; 2 INJECTION, POWDER, FOR SOLUTION INTRAMUSCULAR; INTRAVENOUS at 13:06

## 2025-08-18 RX ADMIN — AMPICILLIN AND SULBACTAM 3 G: 1; 2 INJECTION, POWDER, FOR SOLUTION INTRAMUSCULAR; INTRAVENOUS at 18:29

## 2025-08-18 RX ADMIN — LATANOPROST 1 DROP: 50 SOLUTION OPHTHALMIC at 21:51

## 2025-08-18 RX ADMIN — DORZOLAMIDE HYDROCHLORIDE AND TIMOLOL MALEATE 1 DROP: 20; 5 SOLUTION/ DROPS OPHTHALMIC at 20:42

## 2025-08-18 RX ADMIN — PIPERACILLIN AND TAZOBACTAM 4.5 G: 4; .5 INJECTION, POWDER, FOR SOLUTION INTRAVENOUS at 04:54

## 2025-08-18 RX ADMIN — TAMSULOSIN HYDROCHLORIDE 0.4 MG: 0.4 CAPSULE ORAL at 09:29

## 2025-08-18 RX ADMIN — FAMOTIDINE 20 MG: 20 TABLET, FILM COATED ORAL at 12:58

## 2025-08-18 RX ADMIN — AMIODARONE HYDROCHLORIDE 0.5 MG/MIN: 1.8 INJECTION, SOLUTION INTRAVENOUS at 20:12

## 2025-08-18 RX ADMIN — DORZOLAMIDE HYDROCHLORIDE AND TIMOLOL MALEATE 1 DROP: 20; 5 SOLUTION/ DROPS OPHTHALMIC at 06:13

## 2025-08-18 ASSESSMENT — ENCOUNTER SYMPTOMS
SORE THROAT: 0
COUGH: 0
PALPITATIONS: 0
DIARRHEA: 0
FEVER: 0
NAUSEA: 0
DIZZINESS: 0
CHILLS: 0
VOMITING: 0
DEPRESSION: 0
MYALGIAS: 0
SHORTNESS OF BREATH: 0
ABDOMINAL PAIN: 0

## 2025-08-18 ASSESSMENT — PAIN DESCRIPTION - PAIN TYPE
TYPE: ACUTE PAIN
TYPE: ACUTE PAIN

## 2025-08-18 ASSESSMENT — FIBROSIS 4 INDEX: FIB4 SCORE: 3.06

## 2025-08-19 ENCOUNTER — APPOINTMENT (OUTPATIENT)
Dept: RADIOLOGY | Facility: MEDICAL CENTER | Age: 75
End: 2025-08-19
Attending: STUDENT IN AN ORGANIZED HEALTH CARE EDUCATION/TRAINING PROGRAM
Payer: COMMERCIAL

## 2025-08-19 PROBLEM — E87.6 HYPOKALEMIA: Status: ACTIVE | Noted: 2025-08-19

## 2025-08-19 LAB
ACUTE LEUKEMIA MARKERS SPEC-IMP: NORMAL
ALBUMIN SERPL BCP-MCNC: 2.6 G/DL (ref 3.2–4.9)
ALBUMIN/GLOB SERPL: 0.7 G/DL
ALP SERPL-CCNC: 100 U/L (ref 30–99)
ALT SERPL-CCNC: 17 U/L (ref 2–50)
AMYLASE FLD-CCNC: 18 U/L
ANION GAP SERPL CALC-SCNC: 11 MMOL/L (ref 7–16)
APPEARANCE FLD: CLEAR
AST SERPL-CCNC: 24 U/L (ref 12–45)
BACTERIA UR CULT: NORMAL
BILIRUB SERPL-MCNC: 1.3 MG/DL (ref 0.1–1.5)
BODY FLD TYPE: NORMAL
BUN SERPL-MCNC: 22 MG/DL (ref 8–22)
CALCIUM ALBUM COR SERPL-MCNC: 9.7 MG/DL (ref 8.5–10.5)
CALCIUM SERPL-MCNC: 8.6 MG/DL (ref 8.5–10.5)
CHLORIDE SERPL-SCNC: 103 MMOL/L (ref 96–112)
CO2 SERPL-SCNC: 19 MMOL/L (ref 20–33)
COLOR FLD: YELLOW
CREAT SERPL-MCNC: 1.44 MG/DL (ref 0.5–1.4)
CYTOLOGY REG CYTOL: NORMAL
EKG IMPRESSION: NORMAL
ERYTHROCYTE [DISTWIDTH] IN BLOOD BY AUTOMATED COUNT: 52 FL (ref 35.9–50)
ETEST SENSITIVITY ETEST: NORMAL
EVENTS COUNTED SPEC: 22 MARKERS
FUNGUS SPEC FUNGUS STN: NORMAL
GFR SERPLBLD CREATININE-BSD FMLA CKD-EPI: 51 ML/MIN/1.73 M 2
GLOBULIN SER CALC-MCNC: 3.9 G/DL (ref 1.9–3.5)
GLUCOSE FLD-MCNC: <2 MG/DL
GLUCOSE SERPL-MCNC: 128 MG/DL (ref 65–99)
GRAM STN SPEC: NORMAL
HCT VFR BLD AUTO: 33 % (ref 42–52)
HGB BLD-MCNC: 10.9 G/DL (ref 14–18)
LDH FLD L TO P-CCNC: 382 U/L
LYMPHOCYTES NFR FLD: 3 %
MAGNESIUM SERPL-MCNC: 2 MG/DL (ref 1.5–2.5)
MCH RBC QN AUTO: 29 PG (ref 27–33)
MCHC RBC AUTO-ENTMCNC: 33 G/DL (ref 32.3–36.5)
MCV RBC AUTO: 87.8 FL (ref 81.4–97.8)
MONOS+MACROS NFR FLD MANUAL: 2 %
NEUTROPHILS NFR FLD: 95 %
NUC CELL # FLD: 420 CELLS/UL
PH FLD: 7 [PH]
PHOSPHATE SERPL-MCNC: 2.8 MG/DL (ref 2.5–4.5)
PLATELET # BLD AUTO: 136 K/UL (ref 164–446)
PMV BLD AUTO: 9.8 FL (ref 9–12.9)
POTASSIUM SERPL-SCNC: 3.5 MMOL/L (ref 3.6–5.5)
PRELIMINARY RPT Q0601: NORMAL
PROT FLD-MCNC: 2.9 G/DL
PROT SERPL-MCNC: 6.5 G/DL (ref 6–8.2)
RBC # BLD AUTO: 3.76 M/UL (ref 4.7–6.1)
RBC # FLD: <2000 CELLS/UL
RHODAMINE-AURAMINE STN SPEC: NORMAL
SIGNIFICANT IND 70042: NORMAL
SITE SITE: NORMAL
SODIUM SERPL-SCNC: 133 MMOL/L (ref 135–145)
SOURCE 9121: NORMAL
SOURCE SOURCE: NORMAL
WBC # BLD AUTO: 10.4 K/UL (ref 4.8–10.8)

## 2025-08-19 PROCEDURE — 83735 ASSAY OF MAGNESIUM: CPT

## 2025-08-19 PROCEDURE — 700102 HCHG RX REV CODE 250 W/ 637 OVERRIDE(OP): Performed by: STUDENT IN AN ORGANIZED HEALTH CARE EDUCATION/TRAINING PROGRAM

## 2025-08-19 PROCEDURE — 700111 HCHG RX REV CODE 636 W/ 250 OVERRIDE (IP): Mod: JZ | Performed by: INTERNAL MEDICINE

## 2025-08-19 PROCEDURE — 84100 ASSAY OF PHOSPHORUS: CPT

## 2025-08-19 PROCEDURE — 87015 SPECIMEN INFECT AGNT CONCNTJ: CPT

## 2025-08-19 PROCEDURE — 36415 COLL VENOUS BLD VENIPUNCTURE: CPT

## 2025-08-19 PROCEDURE — 88305 TISSUE EXAM BY PATHOLOGIST: CPT | Performed by: STUDENT IN AN ORGANIZED HEALTH CARE EDUCATION/TRAINING PROGRAM

## 2025-08-19 PROCEDURE — 83986 ASSAY PH BODY FLUID NOS: CPT

## 2025-08-19 PROCEDURE — 700111 HCHG RX REV CODE 636 W/ 250 OVERRIDE (IP): Mod: JZ

## 2025-08-19 PROCEDURE — 700111 HCHG RX REV CODE 636 W/ 250 OVERRIDE (IP): Performed by: INTERNAL MEDICINE

## 2025-08-19 PROCEDURE — 99233 SBSQ HOSP IP/OBS HIGH 50: CPT | Performed by: STUDENT IN AN ORGANIZED HEALTH CARE EDUCATION/TRAINING PROGRAM

## 2025-08-19 PROCEDURE — A9270 NON-COVERED ITEM OR SERVICE: HCPCS

## 2025-08-19 PROCEDURE — 700102 HCHG RX REV CODE 250 W/ 637 OVERRIDE(OP)

## 2025-08-19 PROCEDURE — 99233 SBSQ HOSP IP/OBS HIGH 50: CPT | Mod: 25,GC | Performed by: INTERNAL MEDICINE

## 2025-08-19 PROCEDURE — 87070 CULTURE OTHR SPECIMN AEROBIC: CPT

## 2025-08-19 PROCEDURE — 87102 FUNGUS ISOLATION CULTURE: CPT

## 2025-08-19 PROCEDURE — 99233 SBSQ HOSP IP/OBS HIGH 50: CPT | Performed by: INTERNAL MEDICINE

## 2025-08-19 PROCEDURE — 36500 INSERTION OF CATHETER VEIN: CPT | Mod: LT

## 2025-08-19 PROCEDURE — 97602 WOUND(S) CARE NON-SELECTIVE: CPT

## 2025-08-19 PROCEDURE — A9270 NON-COVERED ITEM OR SERVICE: HCPCS | Performed by: STUDENT IN AN ORGANIZED HEALTH CARE EDUCATION/TRAINING PROGRAM

## 2025-08-19 PROCEDURE — 80053 COMPREHEN METABOLIC PANEL: CPT

## 2025-08-19 PROCEDURE — 87040 BLOOD CULTURE FOR BACTERIA: CPT

## 2025-08-19 PROCEDURE — 700101 HCHG RX REV CODE 250: Mod: JZ | Performed by: INTERNAL MEDICINE

## 2025-08-19 PROCEDURE — 770020 HCHG ROOM/CARE - TELE (206)

## 2025-08-19 PROCEDURE — 83615 LACTATE (LD) (LDH) ENZYME: CPT

## 2025-08-19 PROCEDURE — 82150 ASSAY OF AMYLASE: CPT

## 2025-08-19 PROCEDURE — 85027 COMPLETE CBC AUTOMATED: CPT

## 2025-08-19 PROCEDURE — 88305 TISSUE EXAM BY PATHOLOGIST: CPT | Mod: 26 | Performed by: STUDENT IN AN ORGANIZED HEALTH CARE EDUCATION/TRAINING PROGRAM

## 2025-08-19 PROCEDURE — 700105 HCHG RX REV CODE 258: Performed by: INTERNAL MEDICINE

## 2025-08-19 PROCEDURE — 87116 MYCOBACTERIA CULTURE: CPT

## 2025-08-19 PROCEDURE — 700111 HCHG RX REV CODE 636 W/ 250 OVERRIDE (IP): Mod: JZ | Performed by: RADIOLOGY

## 2025-08-19 PROCEDURE — 82945 GLUCOSE OTHER FLUID: CPT

## 2025-08-19 PROCEDURE — 700102 HCHG RX REV CODE 250 W/ 637 OVERRIDE(OP): Performed by: INTERNAL MEDICINE

## 2025-08-19 PROCEDURE — 88112 CYTOPATH CELL ENHANCE TECH: CPT | Performed by: STUDENT IN AN ORGANIZED HEALTH CARE EDUCATION/TRAINING PROGRAM

## 2025-08-19 PROCEDURE — 93010 ELECTROCARDIOGRAM REPORT: CPT | Performed by: INTERNAL MEDICINE

## 2025-08-19 PROCEDURE — 87205 SMEAR GRAM STAIN: CPT

## 2025-08-19 PROCEDURE — 89051 BODY FLUID CELL COUNT: CPT

## 2025-08-19 PROCEDURE — 84157 ASSAY OF PROTEIN OTHER: CPT

## 2025-08-19 PROCEDURE — A9270 NON-COVERED ITEM OR SERVICE: HCPCS | Performed by: INTERNAL MEDICINE

## 2025-08-19 PROCEDURE — 32561 LYSE CHEST FIBRIN INIT DAY: CPT | Performed by: INTERNAL MEDICINE

## 2025-08-19 PROCEDURE — 88112 CYTOPATH CELL ENHANCE TECH: CPT | Mod: 26 | Performed by: STUDENT IN AN ORGANIZED HEALTH CARE EDUCATION/TRAINING PROGRAM

## 2025-08-19 RX ORDER — MIDAZOLAM HYDROCHLORIDE 1 MG/ML
INJECTION INTRAMUSCULAR; INTRAVENOUS
Status: COMPLETED
Start: 2025-08-19 | End: 2025-08-19

## 2025-08-19 RX ORDER — MIDAZOLAM HYDROCHLORIDE 1 MG/ML
.5-2 INJECTION INTRAMUSCULAR; INTRAVENOUS PRN
Status: ACTIVE | OUTPATIENT
Start: 2025-08-19 | End: 2025-08-19

## 2025-08-19 RX ORDER — SODIUM CHLORIDE 9 MG/ML
500 INJECTION, SOLUTION INTRAVENOUS
Status: ACTIVE | OUTPATIENT
Start: 2025-08-19 | End: 2025-08-19

## 2025-08-19 RX ORDER — ONDANSETRON 2 MG/ML
4 INJECTION INTRAMUSCULAR; INTRAVENOUS PRN
Status: ACTIVE | OUTPATIENT
Start: 2025-08-19 | End: 2025-08-19

## 2025-08-19 RX ORDER — POTASSIUM CHLORIDE 1500 MG/1
40 TABLET, EXTENDED RELEASE ORAL ONCE
Status: COMPLETED | OUTPATIENT
Start: 2025-08-19 | End: 2025-08-19

## 2025-08-19 RX ADMIN — FAMOTIDINE 20 MG: 20 TABLET, FILM COATED ORAL at 05:44

## 2025-08-19 RX ADMIN — AMIODARONE HYDROCHLORIDE 0.5 MG/MIN: 1.8 INJECTION, SOLUTION INTRAVENOUS at 18:08

## 2025-08-19 RX ADMIN — DORNASE ALFA 5 MG: 1 SOLUTION RESPIRATORY (INHALATION) at 13:50

## 2025-08-19 RX ADMIN — MIDAZOLAM HYDROCHLORIDE 1 MG: 1 INJECTION, SOLUTION INTRAMUSCULAR; INTRAVENOUS at 08:53

## 2025-08-19 RX ADMIN — FENTANYL CITRATE 50 MCG: 50 INJECTION, SOLUTION INTRAMUSCULAR; INTRAVENOUS at 08:54

## 2025-08-19 RX ADMIN — MIDAZOLAM HYDROCHLORIDE 1 MG: 1 INJECTION, SOLUTION INTRAMUSCULAR; INTRAVENOUS at 08:59

## 2025-08-19 RX ADMIN — DORZOLAMIDE HYDROCHLORIDE AND TIMOLOL MALEATE 1 DROP: 20; 5 SOLUTION/ DROPS OPHTHALMIC at 16:50

## 2025-08-19 RX ADMIN — ARIPIPRAZOLE 10 MG: 10 TABLET ORAL at 05:44

## 2025-08-19 RX ADMIN — AMIODARONE HYDROCHLORIDE 0.5 MG/MIN: 1.8 INJECTION, SOLUTION INTRAVENOUS at 05:53

## 2025-08-19 RX ADMIN — ALTEPLASE 5 MG: KIT at 13:49

## 2025-08-19 RX ADMIN — POTASSIUM CHLORIDE 40 MEQ: 1500 TABLET, EXTENDED RELEASE ORAL at 12:18

## 2025-08-19 RX ADMIN — AMPICILLIN AND SULBACTAM 3 G: 1; 2 INJECTION, POWDER, FOR SOLUTION INTRAMUSCULAR; INTRAVENOUS at 05:49

## 2025-08-19 RX ADMIN — FENTANYL CITRATE 50 MCG: 50 INJECTION, SOLUTION INTRAMUSCULAR; INTRAVENOUS at 09:07

## 2025-08-19 RX ADMIN — CEFTRIAXONE SODIUM 2000 MG: 10 INJECTION, POWDER, FOR SOLUTION INTRAVENOUS at 12:17

## 2025-08-19 RX ADMIN — AMPICILLIN AND SULBACTAM 3 G: 1; 2 INJECTION, POWDER, FOR SOLUTION INTRAMUSCULAR; INTRAVENOUS at 00:29

## 2025-08-19 RX ADMIN — LATANOPROST 1 DROP: 50 SOLUTION OPHTHALMIC at 20:45

## 2025-08-19 RX ADMIN — ALLOPURINOL 300 MG: 300 TABLET ORAL at 05:44

## 2025-08-19 RX ADMIN — VIBEGRON 75 MG: 75 TABLET, FILM COATED ORAL at 16:50

## 2025-08-19 RX ADMIN — DORZOLAMIDE HYDROCHLORIDE AND TIMOLOL MALEATE 1 DROP: 20; 5 SOLUTION/ DROPS OPHTHALMIC at 05:48

## 2025-08-19 RX ADMIN — TAMSULOSIN HYDROCHLORIDE 0.4 MG: 0.4 CAPSULE ORAL at 12:18

## 2025-08-19 ASSESSMENT — ENCOUNTER SYMPTOMS
COUGH: 0
WEAKNESS: 1
FEVER: 0
DIARRHEA: 0
PALPITATIONS: 0
DIZZINESS: 0
CHILLS: 0
DEPRESSION: 0
SORE THROAT: 0
BACK PAIN: 1
ABDOMINAL PAIN: 0
SHORTNESS OF BREATH: 0
MYALGIAS: 0
VOMITING: 0

## 2025-08-19 ASSESSMENT — COGNITIVE AND FUNCTIONAL STATUS - GENERAL
WALKING IN HOSPITAL ROOM: TOTAL
HELP NEEDED FOR BATHING: TOTAL
MOVING FROM LYING ON BACK TO SITTING ON SIDE OF FLAT BED: TOTAL
MOBILITY SCORE: 6
MOVING TO AND FROM BED TO CHAIR: TOTAL
EATING MEALS: TOTAL
PERSONAL GROOMING: TOTAL
DAILY ACTIVITIY SCORE: 6
DRESSING REGULAR UPPER BODY CLOTHING: TOTAL
CLIMB 3 TO 5 STEPS WITH RAILING: TOTAL
DRESSING REGULAR LOWER BODY CLOTHING: TOTAL
TURNING FROM BACK TO SIDE WHILE IN FLAT BAD: TOTAL
STANDING UP FROM CHAIR USING ARMS: TOTAL
TOILETING: TOTAL
SUGGESTED CMS G CODE MODIFIER DAILY ACTIVITY: CN
SUGGESTED CMS G CODE MODIFIER MOBILITY: CN

## 2025-08-19 ASSESSMENT — PAIN DESCRIPTION - PAIN TYPE: TYPE: ACUTE PAIN

## 2025-08-20 ENCOUNTER — APPOINTMENT (OUTPATIENT)
Dept: RADIOLOGY | Facility: MEDICAL CENTER | Age: 75
End: 2025-08-20
Attending: STUDENT IN AN ORGANIZED HEALTH CARE EDUCATION/TRAINING PROGRAM
Payer: COMMERCIAL

## 2025-08-20 PROBLEM — F32.9 MAJOR DEPRESSIVE DISORDER: Status: ACTIVE | Noted: 2025-08-20

## 2025-08-20 LAB
ALBUMIN SERPL BCP-MCNC: 2.7 G/DL (ref 3.2–4.9)
ALBUMIN/GLOB SERPL: 0.7 G/DL
ALP SERPL-CCNC: 102 U/L (ref 30–99)
ALT SERPL-CCNC: 21 U/L (ref 2–50)
ANION GAP SERPL CALC-SCNC: 11 MMOL/L (ref 7–16)
AST SERPL-CCNC: 36 U/L (ref 12–45)
BACTERIA BLD CULT: ABNORMAL
BILIRUB SERPL-MCNC: 0.8 MG/DL (ref 0.1–1.5)
BUN SERPL-MCNC: 22 MG/DL (ref 8–22)
C GATTII+NEOFOR DNA CSF QL NAA+NON-PROBE: NOT DETECTED
CALCIUM ALBUM COR SERPL-MCNC: 9.5 MG/DL (ref 8.5–10.5)
CALCIUM SERPL-MCNC: 8.5 MG/DL (ref 8.5–10.5)
CHLORIDE SERPL-SCNC: 105 MMOL/L (ref 96–112)
CMV DNA CSF QL NAA+NON-PROBE: NOT DETECTED
CO2 SERPL-SCNC: 20 MMOL/L (ref 20–33)
CREAT SERPL-MCNC: 1.43 MG/DL (ref 0.5–1.4)
E COLI K1 DNA CSF QL NAA+NON-PROBE: NOT DETECTED
ERYTHROCYTE [DISTWIDTH] IN BLOOD BY AUTOMATED COUNT: 50.6 FL (ref 35.9–50)
EV RNA CSF QL NAA+NON-PROBE: NOT DETECTED
GFR SERPLBLD CREATININE-BSD FMLA CKD-EPI: 51 ML/MIN/1.73 M 2
GLOBULIN SER CALC-MCNC: 3.8 G/DL (ref 1.9–3.5)
GLUCOSE BLD STRIP.AUTO-MCNC: 127 MG/DL (ref 65–99)
GLUCOSE CSF-MCNC: 66 MG/DL (ref 40–80)
GLUCOSE SERPL-MCNC: 128 MG/DL (ref 65–99)
GP B STREP DNA CSF QL NAA+NON-PROBE: NOT DETECTED
GRAM STN SPEC: NORMAL
HAEM INFLU DNA CSF QL NAA+NON-PROBE: NOT DETECTED
HCT VFR BLD AUTO: 32.5 % (ref 42–52)
HGB BLD-MCNC: 10.9 G/DL (ref 14–18)
HHV6 DNA CSF QL NAA+NON-PROBE: NOT DETECTED
HSV1 DNA CSF QL NAA+NON-PROBE: NOT DETECTED
HSV2 DNA CSF QL NAA+NON-PROBE: NOT DETECTED
L MONOCYTOG DNA CSF QL NAA+NON-PROBE: NOT DETECTED
MAGNESIUM SERPL-MCNC: 2.1 MG/DL (ref 1.5–2.5)
MCH RBC QN AUTO: 27.9 PG (ref 27–33)
MCHC RBC AUTO-ENTMCNC: 33.5 G/DL (ref 32.3–36.5)
MCV RBC AUTO: 83.3 FL (ref 81.4–97.8)
N MEN DNA CSF QL NAA+NON-PROBE: NOT DETECTED
PARECHOVIRUS A RNA CSF QL NAA+NON-PROBE: NOT DETECTED
PHOSPHATE SERPL-MCNC: 3.2 MG/DL (ref 2.5–4.5)
PLATELET # BLD AUTO: 154 K/UL (ref 164–446)
PMV BLD AUTO: 10 FL (ref 9–12.9)
POTASSIUM SERPL-SCNC: 3.6 MMOL/L (ref 3.6–5.5)
PROT CSF-MCNC: 30 MG/DL (ref 15–45)
PROT SERPL-MCNC: 6.5 G/DL (ref 6–8.2)
RBC # BLD AUTO: 3.9 M/UL (ref 4.7–6.1)
S PNEUM DNA CSF QL NAA+NON-PROBE: NOT DETECTED
SIGNIFICANT IND 70042: ABNORMAL
SIGNIFICANT IND 70042: ABNORMAL
SIGNIFICANT IND 70042: NORMAL
SITE SITE: ABNORMAL
SITE SITE: ABNORMAL
SITE SITE: NORMAL
SODIUM SERPL-SCNC: 136 MMOL/L (ref 135–145)
SOURCE SOURCE: ABNORMAL
SOURCE SOURCE: ABNORMAL
SOURCE SOURCE: NORMAL
TSH SERPL-ACNC: 7.24 UIU/ML (ref 0.38–5.33)
VZV DNA CSF QL NAA+NON-PROBE: NOT DETECTED
WBC # BLD AUTO: 9.8 K/UL (ref 4.8–10.8)

## 2025-08-20 PROCEDURE — 84443 ASSAY THYROID STIM HORMONE: CPT

## 2025-08-20 PROCEDURE — 83735 ASSAY OF MAGNESIUM: CPT

## 2025-08-20 PROCEDURE — 700105 HCHG RX REV CODE 258: Performed by: INTERNAL MEDICINE

## 2025-08-20 PROCEDURE — 99232 SBSQ HOSP IP/OBS MODERATE 35: CPT | Mod: 25,GC | Performed by: INTERNAL MEDICINE

## 2025-08-20 PROCEDURE — 84157 ASSAY OF PROTEIN OTHER: CPT

## 2025-08-20 PROCEDURE — 700111 HCHG RX REV CODE 636 W/ 250 OVERRIDE (IP): Mod: JZ | Performed by: INTERNAL MEDICINE

## 2025-08-20 PROCEDURE — 87205 SMEAR GRAM STAIN: CPT

## 2025-08-20 PROCEDURE — 84439 ASSAY OF FREE THYROXINE: CPT

## 2025-08-20 PROCEDURE — 89051 BODY FLUID CELL COUNT: CPT

## 2025-08-20 PROCEDURE — 80053 COMPREHEN METABOLIC PANEL: CPT

## 2025-08-20 PROCEDURE — 87483 CNS DNA AMP PROBE TYPE 12-25: CPT

## 2025-08-20 PROCEDURE — A9270 NON-COVERED ITEM OR SERVICE: HCPCS | Performed by: INTERNAL MEDICINE

## 2025-08-20 PROCEDURE — G0545 PR INHERENT VISIT TO INPT: HCPCS | Performed by: INTERNAL MEDICINE

## 2025-08-20 PROCEDURE — A9270 NON-COVERED ITEM OR SERVICE: HCPCS

## 2025-08-20 PROCEDURE — 32562 LYSE CHEST FIBRIN SUBQ DAY: CPT | Performed by: INTERNAL MEDICINE

## 2025-08-20 PROCEDURE — 73030 X-RAY EXAM OF SHOULDER: CPT | Mod: LT

## 2025-08-20 PROCEDURE — 87070 CULTURE OTHR SPECIMN AEROBIC: CPT

## 2025-08-20 PROCEDURE — 84100 ASSAY OF PHOSPHORUS: CPT

## 2025-08-20 PROCEDURE — 85027 COMPLETE CBC AUTOMATED: CPT

## 2025-08-20 PROCEDURE — 700111 HCHG RX REV CODE 636 W/ 250 OVERRIDE (IP): Performed by: INTERNAL MEDICINE

## 2025-08-20 PROCEDURE — 99233 SBSQ HOSP IP/OBS HIGH 50: CPT | Mod: 25 | Performed by: STUDENT IN AN ORGANIZED HEALTH CARE EDUCATION/TRAINING PROGRAM

## 2025-08-20 PROCEDURE — 36415 COLL VENOUS BLD VENIPUNCTURE: CPT

## 2025-08-20 PROCEDURE — 97162 PT EVAL MOD COMPLEX 30 MIN: CPT

## 2025-08-20 PROCEDURE — 82945 GLUCOSE OTHER FLUID: CPT

## 2025-08-20 PROCEDURE — 84481 FREE ASSAY (FT-3): CPT

## 2025-08-20 PROCEDURE — 97167 OT EVAL HIGH COMPLEX 60 MIN: CPT

## 2025-08-20 PROCEDURE — 700102 HCHG RX REV CODE 250 W/ 637 OVERRIDE(OP)

## 2025-08-20 PROCEDURE — 82962 GLUCOSE BLOOD TEST: CPT | Performed by: STUDENT IN AN ORGANIZED HEALTH CARE EDUCATION/TRAINING PROGRAM

## 2025-08-20 PROCEDURE — 700102 HCHG RX REV CODE 250 W/ 637 OVERRIDE(OP): Performed by: INTERNAL MEDICINE

## 2025-08-20 PROCEDURE — 700101 HCHG RX REV CODE 250: Mod: JZ | Performed by: INTERNAL MEDICINE

## 2025-08-20 PROCEDURE — 62270 DX LMBR SPI PNXR: CPT | Performed by: HOSPITALIST

## 2025-08-20 PROCEDURE — 99233 SBSQ HOSP IP/OBS HIGH 50: CPT | Performed by: INTERNAL MEDICINE

## 2025-08-20 PROCEDURE — 770020 HCHG ROOM/CARE - TELE (206)

## 2025-08-20 RX ADMIN — ASPIRIN 81 MG: 81 TABLET, CHEWABLE ORAL at 04:17

## 2025-08-20 RX ADMIN — TAMSULOSIN HYDROCHLORIDE 0.4 MG: 0.4 CAPSULE ORAL at 08:41

## 2025-08-20 RX ADMIN — ALLOPURINOL 300 MG: 300 TABLET ORAL at 04:17

## 2025-08-20 RX ADMIN — CEFTRIAXONE SODIUM 2000 MG: 10 INJECTION, POWDER, FOR SOLUTION INTRAVENOUS at 18:50

## 2025-08-20 RX ADMIN — AMIODARONE HYDROCHLORIDE 0.5 MG/MIN: 1.8 INJECTION, SOLUTION INTRAVENOUS at 17:34

## 2025-08-20 RX ADMIN — DORZOLAMIDE HYDROCHLORIDE AND TIMOLOL MALEATE 1 DROP: 20; 5 SOLUTION/ DROPS OPHTHALMIC at 16:42

## 2025-08-20 RX ADMIN — DORZOLAMIDE HYDROCHLORIDE AND TIMOLOL MALEATE 1 DROP: 20; 5 SOLUTION/ DROPS OPHTHALMIC at 04:17

## 2025-08-20 RX ADMIN — DORNASE ALFA 5 MG: 1 SOLUTION RESPIRATORY (INHALATION) at 14:46

## 2025-08-20 RX ADMIN — DEXTROSE MONOHYDRATE: 50 INJECTION, SOLUTION INTRAVENOUS at 04:27

## 2025-08-20 RX ADMIN — ALTEPLASE 5 MG: KIT at 14:46

## 2025-08-20 RX ADMIN — LATANOPROST 1 DROP: 50 SOLUTION OPHTHALMIC at 20:16

## 2025-08-20 RX ADMIN — VIBEGRON 75 MG: 75 TABLET, FILM COATED ORAL at 16:42

## 2025-08-20 RX ADMIN — FAMOTIDINE 20 MG: 20 TABLET, FILM COATED ORAL at 04:17

## 2025-08-20 RX ADMIN — ARIPIPRAZOLE 10 MG: 10 TABLET ORAL at 04:17

## 2025-08-20 RX ADMIN — AMIODARONE HYDROCHLORIDE 0.5 MG/MIN: 1.8 INJECTION, SOLUTION INTRAVENOUS at 04:26

## 2025-08-20 ASSESSMENT — ENCOUNTER SYMPTOMS
BACK PAIN: 1
SHORTNESS OF BREATH: 0
DIZZINESS: 0
DIARRHEA: 0
MYALGIAS: 0
DEPRESSION: 0
VOMITING: 0
FEVER: 0
COUGH: 0
PALPITATIONS: 0
ABDOMINAL PAIN: 0
WEAKNESS: 1
CHILLS: 0
SORE THROAT: 0

## 2025-08-20 ASSESSMENT — GAIT ASSESSMENTS: GAIT LEVEL OF ASSIST: UNABLE TO PARTICIPATE

## 2025-08-20 ASSESSMENT — COGNITIVE AND FUNCTIONAL STATUS - GENERAL
SUGGESTED CMS G CODE MODIFIER DAILY ACTIVITY: CL
DAILY ACTIVITIY SCORE: 12
TOILETING: A LOT
WALKING IN HOSPITAL ROOM: TOTAL
TURNING FROM BACK TO SIDE WHILE IN FLAT BAD: TOTAL
MOVING TO AND FROM BED TO CHAIR: TOTAL
SUGGESTED CMS G CODE MODIFIER MOBILITY: CN
DRESSING REGULAR LOWER BODY CLOTHING: A LOT
PERSONAL GROOMING: A LOT
MOBILITY SCORE: 6
EATING MEALS: A LOT
HELP NEEDED FOR BATHING: A LOT
MOVING FROM LYING ON BACK TO SITTING ON SIDE OF FLAT BED: TOTAL
DRESSING REGULAR UPPER BODY CLOTHING: A LOT
STANDING UP FROM CHAIR USING ARMS: TOTAL
CLIMB 3 TO 5 STEPS WITH RAILING: TOTAL

## 2025-08-20 ASSESSMENT — ACTIVITIES OF DAILY LIVING (ADL): TOILETING: REQUIRES ASSIST

## 2025-08-21 ENCOUNTER — APPOINTMENT (OUTPATIENT)
Dept: RADIOLOGY | Facility: MEDICAL CENTER | Age: 75
End: 2025-08-21
Attending: INTERNAL MEDICINE
Payer: COMMERCIAL

## 2025-08-21 PROBLEM — M25.412 PAIN AND SWELLING OF LEFT SHOULDER: Status: ACTIVE | Noted: 2025-08-21

## 2025-08-21 PROBLEM — I82.613: Status: ACTIVE | Noted: 2025-08-21

## 2025-08-21 PROBLEM — M25.512 PAIN AND SWELLING OF LEFT SHOULDER: Status: ACTIVE | Noted: 2025-08-21

## 2025-08-21 PROBLEM — Z71.89 ACP (ADVANCE CARE PLANNING): Status: ACTIVE | Noted: 2025-08-21

## 2025-08-21 LAB
ALBUMIN SERPL BCP-MCNC: 2.4 G/DL (ref 3.2–4.9)
ALBUMIN/GLOB SERPL: 0.6 G/DL
ALP SERPL-CCNC: 104 U/L (ref 30–99)
ALT SERPL-CCNC: 19 U/L (ref 2–50)
ANION GAP SERPL CALC-SCNC: 11 MMOL/L (ref 7–16)
AST SERPL-CCNC: 35 U/L (ref 12–45)
BACTERIA FLD AEROBE CULT: ABNORMAL
BACTERIA FLD AEROBE CULT: ABNORMAL
BILIRUB SERPL-MCNC: 0.6 MG/DL (ref 0.1–1.5)
BUN SERPL-MCNC: 19 MG/DL (ref 8–22)
BURR CELLS/RBC NFR CSF MANUAL: 0 %
CALCIUM ALBUM COR SERPL-MCNC: 9.6 MG/DL (ref 8.5–10.5)
CALCIUM SERPL-MCNC: 8.3 MG/DL (ref 8.5–10.5)
CHLORIDE SERPL-SCNC: 102 MMOL/L (ref 96–112)
CLARITY CSF: CLEAR
CO2 SERPL-SCNC: 20 MMOL/L (ref 20–33)
COLOR CSF: COLORLESS
COLOR SPUN CSF: COLORLESS
CREAT SERPL-MCNC: 1.26 MG/DL (ref 0.5–1.4)
CSF COMMENTS 1658: NORMAL
ERYTHROCYTE [DISTWIDTH] IN BLOOD BY AUTOMATED COUNT: 51.6 FL (ref 35.9–50)
ETEST SENSITIVITY ETEST: NORMAL
GFR SERPLBLD CREATININE-BSD FMLA CKD-EPI: 60 ML/MIN/1.73 M 2
GLOBULIN SER CALC-MCNC: 3.8 G/DL (ref 1.9–3.5)
GLUCOSE SERPL-MCNC: 131 MG/DL (ref 65–99)
GRAM STN SPEC: ABNORMAL
HCT VFR BLD AUTO: 32.3 % (ref 42–52)
HGB BLD-MCNC: 10.5 G/DL (ref 14–18)
MCH RBC QN AUTO: 28.3 PG (ref 27–33)
MCHC RBC AUTO-ENTMCNC: 32.5 G/DL (ref 32.3–36.5)
MCV RBC AUTO: 87.1 FL (ref 81.4–97.8)
NUC CELL # CSF: 3 CELLS/UL (ref 0–10)
PLATELET # BLD AUTO: 185 K/UL (ref 164–446)
PMV BLD AUTO: 10.4 FL (ref 9–12.9)
POTASSIUM SERPL-SCNC: 3.5 MMOL/L (ref 3.6–5.5)
PRELIMINARY RPT Q0601: NORMAL
PROT SERPL-MCNC: 6.2 G/DL (ref 6–8.2)
RBC # BLD AUTO: 3.71 M/UL (ref 4.7–6.1)
RBC # CSF: 1 CELLS/UL
RHODAMINE-AURAMINE STN SPEC: NORMAL
SIGNIFICANT IND 70042: ABNORMAL
SITE SITE: ABNORMAL
SODIUM SERPL-SCNC: 133 MMOL/L (ref 135–145)
SOURCE SOURCE: ABNORMAL
SPECIMEN VOL CSF: 10 ML
T3FREE SERPL-MCNC: 1.41 PG/ML (ref 2–4.4)
T4 FREE SERPL-MCNC: 0.78 NG/DL (ref 0.93–1.7)
TUBE # CSF: 3
TUBE # CSF: 4
WBC # BLD AUTO: 10 K/UL (ref 4.8–10.8)

## 2025-08-21 PROCEDURE — 700102 HCHG RX REV CODE 250 W/ 637 OVERRIDE(OP)

## 2025-08-21 PROCEDURE — 85027 COMPLETE CBC AUTOMATED: CPT

## 2025-08-21 PROCEDURE — 700102 HCHG RX REV CODE 250 W/ 637 OVERRIDE(OP): Performed by: INTERNAL MEDICINE

## 2025-08-21 PROCEDURE — A9270 NON-COVERED ITEM OR SERVICE: HCPCS | Performed by: INTERNAL MEDICINE

## 2025-08-21 PROCEDURE — 700111 HCHG RX REV CODE 636 W/ 250 OVERRIDE (IP): Mod: JZ | Performed by: STUDENT IN AN ORGANIZED HEALTH CARE EDUCATION/TRAINING PROGRAM

## 2025-08-21 PROCEDURE — 700105 HCHG RX REV CODE 258: Performed by: INTERNAL MEDICINE

## 2025-08-21 PROCEDURE — 770020 HCHG ROOM/CARE - TELE (206)

## 2025-08-21 PROCEDURE — 700111 HCHG RX REV CODE 636 W/ 250 OVERRIDE (IP): Mod: JZ | Performed by: INTERNAL MEDICINE

## 2025-08-21 PROCEDURE — 71045 X-RAY EXAM CHEST 1 VIEW: CPT

## 2025-08-21 PROCEDURE — 700101 HCHG RX REV CODE 250: Performed by: STUDENT IN AN ORGANIZED HEALTH CARE EDUCATION/TRAINING PROGRAM

## 2025-08-21 PROCEDURE — 99233 SBSQ HOSP IP/OBS HIGH 50: CPT | Mod: GC | Performed by: INTERNAL MEDICINE

## 2025-08-21 PROCEDURE — 99233 SBSQ HOSP IP/OBS HIGH 50: CPT | Performed by: STUDENT IN AN ORGANIZED HEALTH CARE EDUCATION/TRAINING PROGRAM

## 2025-08-21 PROCEDURE — A9270 NON-COVERED ITEM OR SERVICE: HCPCS | Performed by: STUDENT IN AN ORGANIZED HEALTH CARE EDUCATION/TRAINING PROGRAM

## 2025-08-21 PROCEDURE — 700102 HCHG RX REV CODE 250 W/ 637 OVERRIDE(OP): Performed by: STUDENT IN AN ORGANIZED HEALTH CARE EDUCATION/TRAINING PROGRAM

## 2025-08-21 PROCEDURE — 80053 COMPREHEN METABOLIC PANEL: CPT

## 2025-08-21 PROCEDURE — 700101 HCHG RX REV CODE 250

## 2025-08-21 PROCEDURE — 99232 SBSQ HOSP IP/OBS MODERATE 35: CPT | Mod: 25,GC | Performed by: INTERNAL MEDICINE

## 2025-08-21 PROCEDURE — 36415 COLL VENOUS BLD VENIPUNCTURE: CPT

## 2025-08-21 PROCEDURE — 700111 HCHG RX REV CODE 636 W/ 250 OVERRIDE (IP): Performed by: INTERNAL MEDICINE

## 2025-08-21 PROCEDURE — A9270 NON-COVERED ITEM OR SERVICE: HCPCS

## 2025-08-21 PROCEDURE — G0316 PR PROLONGED IP/OBS E&M EA 15 MIN: HCPCS | Performed by: STUDENT IN AN ORGANIZED HEALTH CARE EDUCATION/TRAINING PROGRAM

## 2025-08-21 PROCEDURE — 32562 LYSE CHEST FIBRIN SUBQ DAY: CPT | Performed by: INTERNAL MEDICINE

## 2025-08-21 PROCEDURE — C1729 CATH, DRAINAGE: HCPCS | Performed by: STUDENT IN AN ORGANIZED HEALTH CARE EDUCATION/TRAINING PROGRAM

## 2025-08-21 PROCEDURE — 700101 HCHG RX REV CODE 250: Mod: JZ | Performed by: INTERNAL MEDICINE

## 2025-08-21 PROCEDURE — 94640 AIRWAY INHALATION TREATMENT: CPT

## 2025-08-21 RX ORDER — ENOXAPARIN SODIUM 100 MG/ML
40 INJECTION SUBCUTANEOUS DAILY
Status: DISCONTINUED | OUTPATIENT
Start: 2025-08-21 | End: 2025-08-23

## 2025-08-21 RX ORDER — ALBUTEROL SULFATE 5 MG/ML
2.5 SOLUTION RESPIRATORY (INHALATION)
Status: DISPENSED | OUTPATIENT
Start: 2025-08-21

## 2025-08-21 RX ORDER — ALBUTEROL SULFATE 5 MG/ML
2.5 SOLUTION RESPIRATORY (INHALATION)
Status: DISCONTINUED | OUTPATIENT
Start: 2025-08-21 | End: 2025-08-25

## 2025-08-21 RX ORDER — ACETYLCYSTEINE 200 MG/ML
3 SOLUTION ORAL; RESPIRATORY (INHALATION)
Status: DISCONTINUED | OUTPATIENT
Start: 2025-08-21 | End: 2025-08-28

## 2025-08-21 RX ORDER — AMIODARONE HYDROCHLORIDE 200 MG/1
200 TABLET ORAL DAILY
Status: DISCONTINUED | OUTPATIENT
Start: 2025-08-21 | End: 2025-08-25

## 2025-08-21 RX ORDER — POTASSIUM CHLORIDE 1500 MG/1
40 TABLET, EXTENDED RELEASE ORAL ONCE
Status: COMPLETED | OUTPATIENT
Start: 2025-08-21 | End: 2025-08-21

## 2025-08-21 RX ADMIN — ASPIRIN 81 MG: 81 TABLET, CHEWABLE ORAL at 05:14

## 2025-08-21 RX ADMIN — VIBEGRON 75 MG: 75 TABLET, FILM COATED ORAL at 18:38

## 2025-08-21 RX ADMIN — AMIODARONE HYDROCHLORIDE 200 MG: 200 TABLET ORAL at 18:38

## 2025-08-21 RX ADMIN — FAMOTIDINE 20 MG: 20 TABLET, FILM COATED ORAL at 05:14

## 2025-08-21 RX ADMIN — ALLOPURINOL 300 MG: 300 TABLET ORAL at 05:14

## 2025-08-21 RX ADMIN — ARIPIPRAZOLE 10 MG: 10 TABLET ORAL at 05:14

## 2025-08-21 RX ADMIN — DORZOLAMIDE HYDROCHLORIDE AND TIMOLOL MALEATE 1 DROP: 20; 5 SOLUTION/ DROPS OPHTHALMIC at 18:38

## 2025-08-21 RX ADMIN — AMIODARONE HYDROCHLORIDE 0.5 MG/MIN: 1.8 INJECTION, SOLUTION INTRAVENOUS at 05:12

## 2025-08-21 RX ADMIN — ALBUTEROL SULFATE 2.5 MG: 2.5 SOLUTION RESPIRATORY (INHALATION) at 22:49

## 2025-08-21 RX ADMIN — DORNASE ALFA 5 MG: 1 SOLUTION RESPIRATORY (INHALATION) at 15:46

## 2025-08-21 RX ADMIN — ALTEPLASE 5 MG: KIT at 15:46

## 2025-08-21 RX ADMIN — TAMSULOSIN HYDROCHLORIDE 0.4 MG: 0.4 CAPSULE ORAL at 10:31

## 2025-08-21 RX ADMIN — AMPICILLIN SODIUM, SULBACTAM SODIUM 3 G: 2; 1 INJECTION, POWDER, FOR SOLUTION INTRAMUSCULAR; INTRAVENOUS at 12:14

## 2025-08-21 RX ADMIN — CEFTRIAXONE SODIUM 2000 MG: 10 INJECTION, POWDER, FOR SOLUTION INTRAVENOUS at 05:12

## 2025-08-21 RX ADMIN — AMPICILLIN SODIUM, SULBACTAM SODIUM 3 G: 2; 1 INJECTION, POWDER, FOR SOLUTION INTRAMUSCULAR; INTRAVENOUS at 23:53

## 2025-08-21 RX ADMIN — DORZOLAMIDE HYDROCHLORIDE AND TIMOLOL MALEATE 1 DROP: 20; 5 SOLUTION/ DROPS OPHTHALMIC at 05:14

## 2025-08-21 RX ADMIN — POTASSIUM CHLORIDE 40 MEQ: 1500 TABLET, EXTENDED RELEASE ORAL at 10:31

## 2025-08-21 RX ADMIN — AMPICILLIN SODIUM, SULBACTAM SODIUM 3 G: 2; 1 INJECTION, POWDER, FOR SOLUTION INTRAMUSCULAR; INTRAVENOUS at 18:40

## 2025-08-21 RX ADMIN — ACETYLCYSTEINE 3 ML: 200 SOLUTION ORAL; RESPIRATORY (INHALATION) at 22:49

## 2025-08-21 RX ADMIN — ENOXAPARIN SODIUM 40 MG: 100 INJECTION SUBCUTANEOUS at 18:38

## 2025-08-21 ASSESSMENT — ENCOUNTER SYMPTOMS
ABDOMINAL PAIN: 0
DIZZINESS: 0
VOMITING: 0
FEVER: 0
COUGH: 0
DIARRHEA: 0
SORE THROAT: 0
WEAKNESS: 1
MYALGIAS: 0
BACK PAIN: 1
PALPITATIONS: 0
CHILLS: 0
DEPRESSION: 0
SHORTNESS OF BREATH: 0

## 2025-08-21 ASSESSMENT — PATIENT HEALTH QUESTIONNAIRE - PHQ9
2. FEELING DOWN, DEPRESSED, IRRITABLE, OR HOPELESS: NOT AT ALL
1. LITTLE INTEREST OR PLEASURE IN DOING THINGS: NOT AT ALL
SUM OF ALL RESPONSES TO PHQ9 QUESTIONS 1 AND 2: 0

## 2025-08-21 ASSESSMENT — PAIN DESCRIPTION - PAIN TYPE
TYPE: ACUTE PAIN
TYPE: ACUTE PAIN

## 2025-08-21 ASSESSMENT — COPD QUESTIONNAIRES
COPD SCREENING SCORE: 7
HAVE YOU SMOKED AT LEAST 100 CIGARETTES IN YOUR ENTIRE LIFE: NO/DON'T KNOW
DO YOU EVER COUGH UP ANY MUCUS OR PHLEGM?: YES, EVERY DAY
DURING THE PAST 4 WEEKS HOW MUCH DID YOU FEEL SHORT OF BREATH: MOST  OR ALL OF THE TIME

## 2025-08-22 ENCOUNTER — APPOINTMENT (OUTPATIENT)
Dept: RADIOLOGY | Facility: MEDICAL CENTER | Age: 75
End: 2025-08-22
Attending: INTERNAL MEDICINE
Payer: COMMERCIAL

## 2025-08-22 PROBLEM — E03.9 HYPOTHYROIDISM: Status: ACTIVE | Noted: 2025-08-22

## 2025-08-22 LAB
ALBUMIN SERPL BCP-MCNC: 2.5 G/DL (ref 3.2–4.9)
ALBUMIN/GLOB SERPL: 0.7 G/DL
ALP SERPL-CCNC: 113 U/L (ref 30–99)
ALT SERPL-CCNC: 19 U/L (ref 2–50)
ANION GAP SERPL CALC-SCNC: 9 MMOL/L (ref 7–16)
AST SERPL-CCNC: 31 U/L (ref 12–45)
BACTERIA FLD AEROBE CULT: NORMAL
BILIRUB SERPL-MCNC: 0.7 MG/DL (ref 0.1–1.5)
BUN SERPL-MCNC: 16 MG/DL (ref 8–22)
CALCIUM ALBUM COR SERPL-MCNC: 9.4 MG/DL (ref 8.5–10.5)
CALCIUM SERPL-MCNC: 8.2 MG/DL (ref 8.5–10.5)
CHLORIDE SERPL-SCNC: 105 MMOL/L (ref 96–112)
CO2 SERPL-SCNC: 21 MMOL/L (ref 20–33)
CREAT SERPL-MCNC: 1.27 MG/DL (ref 0.5–1.4)
ERYTHROCYTE [DISTWIDTH] IN BLOOD BY AUTOMATED COUNT: 51 FL (ref 35.9–50)
FUNGUS SPEC CULT: NORMAL
FUNGUS SPEC CULT: NORMAL
FUNGUS SPEC FUNGUS STN: NORMAL
FUNGUS SPEC FUNGUS STN: NORMAL
GFR SERPLBLD CREATININE-BSD FMLA CKD-EPI: 59 ML/MIN/1.73 M 2
GLOBULIN SER CALC-MCNC: 3.7 G/DL (ref 1.9–3.5)
GLUCOSE SERPL-MCNC: 115 MG/DL (ref 65–99)
GRAM STN SPEC: NORMAL
HCT VFR BLD AUTO: 33.1 % (ref 42–52)
HGB BLD-MCNC: 10.9 G/DL (ref 14–18)
MCH RBC QN AUTO: 28.5 PG (ref 27–33)
MCHC RBC AUTO-ENTMCNC: 32.9 G/DL (ref 32.3–36.5)
MCV RBC AUTO: 86.6 FL (ref 81.4–97.8)
PLATELET # BLD AUTO: 222 K/UL (ref 164–446)
PMV BLD AUTO: 9.8 FL (ref 9–12.9)
POTASSIUM SERPL-SCNC: 3.6 MMOL/L (ref 3.6–5.5)
PROT SERPL-MCNC: 6.2 G/DL (ref 6–8.2)
RBC # BLD AUTO: 3.82 M/UL (ref 4.7–6.1)
SIGNIFICANT IND 70042: NORMAL
SITE SITE: NORMAL
SODIUM SERPL-SCNC: 135 MMOL/L (ref 135–145)
SOURCE SOURCE: NORMAL
WBC # BLD AUTO: 9.2 K/UL (ref 4.8–10.8)

## 2025-08-22 PROCEDURE — 700101 HCHG RX REV CODE 250

## 2025-08-22 PROCEDURE — 99233 SBSQ HOSP IP/OBS HIGH 50: CPT | Performed by: STUDENT IN AN ORGANIZED HEALTH CARE EDUCATION/TRAINING PROGRAM

## 2025-08-22 PROCEDURE — 700111 HCHG RX REV CODE 636 W/ 250 OVERRIDE (IP): Mod: JZ | Performed by: INTERNAL MEDICINE

## 2025-08-22 PROCEDURE — 700101 HCHG RX REV CODE 250: Performed by: STUDENT IN AN ORGANIZED HEALTH CARE EDUCATION/TRAINING PROGRAM

## 2025-08-22 PROCEDURE — 99232 SBSQ HOSP IP/OBS MODERATE 35: CPT | Mod: GC | Performed by: INTERNAL MEDICINE

## 2025-08-22 PROCEDURE — 94669 MECHANICAL CHEST WALL OSCILL: CPT

## 2025-08-22 PROCEDURE — 99233 SBSQ HOSP IP/OBS HIGH 50: CPT | Mod: GC | Performed by: INTERNAL MEDICINE

## 2025-08-22 PROCEDURE — 700105 HCHG RX REV CODE 258: Performed by: INTERNAL MEDICINE

## 2025-08-22 PROCEDURE — 85027 COMPLETE CBC AUTOMATED: CPT

## 2025-08-22 PROCEDURE — 700102 HCHG RX REV CODE 250 W/ 637 OVERRIDE(OP)

## 2025-08-22 PROCEDURE — 71045 X-RAY EXAM CHEST 1 VIEW: CPT

## 2025-08-22 PROCEDURE — A9270 NON-COVERED ITEM OR SERVICE: HCPCS

## 2025-08-22 PROCEDURE — A9270 NON-COVERED ITEM OR SERVICE: HCPCS | Performed by: INTERNAL MEDICINE

## 2025-08-22 PROCEDURE — 36415 COLL VENOUS BLD VENIPUNCTURE: CPT

## 2025-08-22 PROCEDURE — 700111 HCHG RX REV CODE 636 W/ 250 OVERRIDE (IP): Mod: JZ | Performed by: STUDENT IN AN ORGANIZED HEALTH CARE EDUCATION/TRAINING PROGRAM

## 2025-08-22 PROCEDURE — 700102 HCHG RX REV CODE 250 W/ 637 OVERRIDE(OP): Performed by: STUDENT IN AN ORGANIZED HEALTH CARE EDUCATION/TRAINING PROGRAM

## 2025-08-22 PROCEDURE — 80053 COMPREHEN METABOLIC PANEL: CPT

## 2025-08-22 PROCEDURE — 770020 HCHG ROOM/CARE - TELE (206)

## 2025-08-22 PROCEDURE — 94640 AIRWAY INHALATION TREATMENT: CPT

## 2025-08-22 PROCEDURE — A9270 NON-COVERED ITEM OR SERVICE: HCPCS | Performed by: STUDENT IN AN ORGANIZED HEALTH CARE EDUCATION/TRAINING PROGRAM

## 2025-08-22 PROCEDURE — 94760 N-INVAS EAR/PLS OXIMETRY 1: CPT

## 2025-08-22 PROCEDURE — 700102 HCHG RX REV CODE 250 W/ 637 OVERRIDE(OP): Performed by: INTERNAL MEDICINE

## 2025-08-22 RX ORDER — POTASSIUM CHLORIDE 1500 MG/1
40 TABLET, EXTENDED RELEASE ORAL ONCE
Status: COMPLETED | OUTPATIENT
Start: 2025-08-22 | End: 2025-08-22

## 2025-08-22 RX ORDER — LEVOTHYROXINE SODIUM 25 UG/1
25 TABLET ORAL
Status: DISCONTINUED | OUTPATIENT
Start: 2025-08-22 | End: 2025-08-25

## 2025-08-22 RX ADMIN — ARIPIPRAZOLE 10 MG: 10 TABLET ORAL at 06:07

## 2025-08-22 RX ADMIN — AMPICILLIN SODIUM, SULBACTAM SODIUM 3 G: 2; 1 INJECTION, POWDER, FOR SOLUTION INTRAMUSCULAR; INTRAVENOUS at 06:07

## 2025-08-22 RX ADMIN — AMPICILLIN SODIUM, SULBACTAM SODIUM 3 G: 2; 1 INJECTION, POWDER, FOR SOLUTION INTRAMUSCULAR; INTRAVENOUS at 18:00

## 2025-08-22 RX ADMIN — FAMOTIDINE 20 MG: 20 TABLET, FILM COATED ORAL at 06:07

## 2025-08-22 RX ADMIN — LATANOPROST 1 DROP: 50 SOLUTION OPHTHALMIC at 20:54

## 2025-08-22 RX ADMIN — ACETYLCYSTEINE 3 ML: 200 SOLUTION ORAL; RESPIRATORY (INHALATION) at 19:37

## 2025-08-22 RX ADMIN — ALBUTEROL SULFATE 2.5 MG: 2.5 SOLUTION RESPIRATORY (INHALATION) at 07:58

## 2025-08-22 RX ADMIN — AMPICILLIN SODIUM, SULBACTAM SODIUM 3 G: 2; 1 INJECTION, POWDER, FOR SOLUTION INTRAMUSCULAR; INTRAVENOUS at 23:16

## 2025-08-22 RX ADMIN — TAMSULOSIN HYDROCHLORIDE 0.4 MG: 0.4 CAPSULE ORAL at 09:19

## 2025-08-22 RX ADMIN — ALBUTEROL SULFATE 2.5 MG: 2.5 SOLUTION RESPIRATORY (INHALATION) at 10:58

## 2025-08-22 RX ADMIN — AMPICILLIN SODIUM, SULBACTAM SODIUM 3 G: 2; 1 INJECTION, POWDER, FOR SOLUTION INTRAMUSCULAR; INTRAVENOUS at 12:40

## 2025-08-22 RX ADMIN — ALBUTEROL SULFATE 2.5 MG: 2.5 SOLUTION RESPIRATORY (INHALATION) at 15:21

## 2025-08-22 RX ADMIN — DORZOLAMIDE HYDROCHLORIDE AND TIMOLOL MALEATE 1 DROP: 20; 5 SOLUTION/ DROPS OPHTHALMIC at 06:07

## 2025-08-22 RX ADMIN — ALBUTEROL SULFATE 2.5 MG: 2.5 SOLUTION RESPIRATORY (INHALATION) at 19:37

## 2025-08-22 RX ADMIN — ASPIRIN 81 MG: 81 TABLET, CHEWABLE ORAL at 06:07

## 2025-08-22 RX ADMIN — ACETYLCYSTEINE 3 ML: 200 SOLUTION ORAL; RESPIRATORY (INHALATION) at 10:59

## 2025-08-22 RX ADMIN — AMIODARONE HYDROCHLORIDE 200 MG: 200 TABLET ORAL at 18:01

## 2025-08-22 RX ADMIN — ENOXAPARIN SODIUM 40 MG: 100 INJECTION SUBCUTANEOUS at 18:01

## 2025-08-22 RX ADMIN — POTASSIUM CHLORIDE 40 MEQ: 1500 TABLET, EXTENDED RELEASE ORAL at 09:19

## 2025-08-22 RX ADMIN — DORZOLAMIDE HYDROCHLORIDE AND TIMOLOL MALEATE 1 DROP: 20; 5 SOLUTION/ DROPS OPHTHALMIC at 18:02

## 2025-08-22 RX ADMIN — ALLOPURINOL 300 MG: 300 TABLET ORAL at 06:07

## 2025-08-22 RX ADMIN — ALBUTEROL SULFATE 2.5 MG: 2.5 SOLUTION RESPIRATORY (INHALATION) at 03:12

## 2025-08-22 RX ADMIN — VIBEGRON 75 MG: 75 TABLET, FILM COATED ORAL at 18:01

## 2025-08-22 RX ADMIN — ACETYLCYSTEINE 3 ML: 200 SOLUTION ORAL; RESPIRATORY (INHALATION) at 15:22

## 2025-08-22 RX ADMIN — ACETYLCYSTEINE 3 ML: 200 SOLUTION ORAL; RESPIRATORY (INHALATION) at 03:12

## 2025-08-22 ASSESSMENT — ENCOUNTER SYMPTOMS
WEAKNESS: 1
COUGH: 0
DIARRHEA: 0
CHILLS: 0
ABDOMINAL PAIN: 0
BACK PAIN: 1
PALPITATIONS: 0
FEVER: 0
SHORTNESS OF BREATH: 0
DEPRESSION: 0
VOMITING: 0
MYALGIAS: 0
SORE THROAT: 0
DIZZINESS: 0

## 2025-08-22 ASSESSMENT — PAIN DESCRIPTION - PAIN TYPE: TYPE: ACUTE PAIN

## 2025-08-23 ENCOUNTER — APPOINTMENT (OUTPATIENT)
Dept: RADIOLOGY | Facility: MEDICAL CENTER | Age: 75
End: 2025-08-23
Attending: INTERNAL MEDICINE
Payer: COMMERCIAL

## 2025-08-23 LAB
ALBUMIN SERPL BCP-MCNC: 2.6 G/DL (ref 3.2–4.9)
ALBUMIN/GLOB SERPL: 0.7 G/DL
ALP SERPL-CCNC: 109 U/L (ref 30–99)
ALT SERPL-CCNC: 16 U/L (ref 2–50)
ANION GAP SERPL CALC-SCNC: 10 MMOL/L (ref 7–16)
AST SERPL-CCNC: 27 U/L (ref 12–45)
BACTERIA CSF CULT: NORMAL
BILIRUB SERPL-MCNC: 0.7 MG/DL (ref 0.1–1.5)
BUN SERPL-MCNC: 15 MG/DL (ref 8–22)
CALCIUM ALBUM COR SERPL-MCNC: 9.1 MG/DL (ref 8.5–10.5)
CALCIUM SERPL-MCNC: 8 MG/DL (ref 8.5–10.5)
CHLORIDE SERPL-SCNC: 106 MMOL/L (ref 96–112)
CO2 SERPL-SCNC: 20 MMOL/L (ref 20–33)
CREAT SERPL-MCNC: 1.13 MG/DL (ref 0.5–1.4)
ERYTHROCYTE [DISTWIDTH] IN BLOOD BY AUTOMATED COUNT: 50.5 FL (ref 35.9–50)
GFR SERPLBLD CREATININE-BSD FMLA CKD-EPI: 68 ML/MIN/1.73 M 2
GLOBULIN SER CALC-MCNC: 3.6 G/DL (ref 1.9–3.5)
GLUCOSE SERPL-MCNC: 94 MG/DL (ref 65–99)
GRAM STN SPEC: NORMAL
HCT VFR BLD AUTO: 30.3 % (ref 42–52)
HGB BLD-MCNC: 9.8 G/DL (ref 14–18)
MCH RBC QN AUTO: 27.8 PG (ref 27–33)
MCHC RBC AUTO-ENTMCNC: 32.3 G/DL (ref 32.3–36.5)
MCV RBC AUTO: 86.1 FL (ref 81.4–97.8)
NT-PROBNP SERPL IA-MCNC: 1007 PG/ML (ref 0–125)
PLATELET # BLD AUTO: 245 K/UL (ref 164–446)
PMV BLD AUTO: 9.7 FL (ref 9–12.9)
POTASSIUM SERPL-SCNC: 4 MMOL/L (ref 3.6–5.5)
PROT SERPL-MCNC: 6.2 G/DL (ref 6–8.2)
RBC # BLD AUTO: 3.52 M/UL (ref 4.7–6.1)
SIGNIFICANT IND 70042: NORMAL
SITE SITE: NORMAL
SODIUM SERPL-SCNC: 136 MMOL/L (ref 135–145)
SOURCE SOURCE: NORMAL
WBC # BLD AUTO: 7.1 K/UL (ref 4.8–10.8)

## 2025-08-23 PROCEDURE — A9270 NON-COVERED ITEM OR SERVICE: HCPCS

## 2025-08-23 PROCEDURE — 700111 HCHG RX REV CODE 636 W/ 250 OVERRIDE (IP): Mod: JZ | Performed by: INTERNAL MEDICINE

## 2025-08-23 PROCEDURE — 700101 HCHG RX REV CODE 250

## 2025-08-23 PROCEDURE — 770020 HCHG ROOM/CARE - TELE (206)

## 2025-08-23 PROCEDURE — 94669 MECHANICAL CHEST WALL OSCILL: CPT

## 2025-08-23 PROCEDURE — A9270 NON-COVERED ITEM OR SERVICE: HCPCS | Performed by: STUDENT IN AN ORGANIZED HEALTH CARE EDUCATION/TRAINING PROGRAM

## 2025-08-23 PROCEDURE — 83880 ASSAY OF NATRIURETIC PEPTIDE: CPT

## 2025-08-23 PROCEDURE — 700102 HCHG RX REV CODE 250 W/ 637 OVERRIDE(OP)

## 2025-08-23 PROCEDURE — A9270 NON-COVERED ITEM OR SERVICE: HCPCS | Performed by: INTERNAL MEDICINE

## 2025-08-23 PROCEDURE — 99233 SBSQ HOSP IP/OBS HIGH 50: CPT | Performed by: STUDENT IN AN ORGANIZED HEALTH CARE EDUCATION/TRAINING PROGRAM

## 2025-08-23 PROCEDURE — 700102 HCHG RX REV CODE 250 W/ 637 OVERRIDE(OP): Performed by: INTERNAL MEDICINE

## 2025-08-23 PROCEDURE — 71250 CT THORAX DX C-: CPT

## 2025-08-23 PROCEDURE — 700101 HCHG RX REV CODE 250: Performed by: STUDENT IN AN ORGANIZED HEALTH CARE EDUCATION/TRAINING PROGRAM

## 2025-08-23 PROCEDURE — 94760 N-INVAS EAR/PLS OXIMETRY 1: CPT

## 2025-08-23 PROCEDURE — 700111 HCHG RX REV CODE 636 W/ 250 OVERRIDE (IP): Performed by: STUDENT IN AN ORGANIZED HEALTH CARE EDUCATION/TRAINING PROGRAM

## 2025-08-23 PROCEDURE — 36415 COLL VENOUS BLD VENIPUNCTURE: CPT

## 2025-08-23 PROCEDURE — 94640 AIRWAY INHALATION TREATMENT: CPT

## 2025-08-23 PROCEDURE — 85027 COMPLETE CBC AUTOMATED: CPT

## 2025-08-23 PROCEDURE — 99232 SBSQ HOSP IP/OBS MODERATE 35: CPT | Performed by: INTERNAL MEDICINE

## 2025-08-23 PROCEDURE — 700105 HCHG RX REV CODE 258: Performed by: INTERNAL MEDICINE

## 2025-08-23 PROCEDURE — 80053 COMPREHEN METABOLIC PANEL: CPT

## 2025-08-23 PROCEDURE — 700102 HCHG RX REV CODE 250 W/ 637 OVERRIDE(OP): Performed by: STUDENT IN AN ORGANIZED HEALTH CARE EDUCATION/TRAINING PROGRAM

## 2025-08-23 RX ORDER — BISACODYL 10 MG
10 SUPPOSITORY, RECTAL RECTAL
Status: DISCONTINUED | OUTPATIENT
Start: 2025-08-23 | End: 2025-08-25

## 2025-08-23 RX ORDER — POLYETHYLENE GLYCOL 3350 17 G/17G
1 POWDER, FOR SOLUTION ORAL
Status: DISCONTINUED | OUTPATIENT
Start: 2025-08-23 | End: 2025-08-25

## 2025-08-23 RX ORDER — POLYETHYLENE GLYCOL 3350 17 G/17G
1 POWDER, FOR SOLUTION ORAL DAILY
Status: DISCONTINUED | OUTPATIENT
Start: 2025-08-23 | End: 2025-08-25

## 2025-08-23 RX ADMIN — ALBUTEROL SULFATE 2.5 MG: 2.5 SOLUTION RESPIRATORY (INHALATION) at 07:57

## 2025-08-23 RX ADMIN — VIBEGRON 75 MG: 75 TABLET, FILM COATED ORAL at 17:27

## 2025-08-23 RX ADMIN — AMPICILLIN SODIUM, SULBACTAM SODIUM 3 G: 2; 1 INJECTION, POWDER, FOR SOLUTION INTRAMUSCULAR; INTRAVENOUS at 12:17

## 2025-08-23 RX ADMIN — LATANOPROST 1 DROP: 50 SOLUTION OPHTHALMIC at 22:39

## 2025-08-23 RX ADMIN — AMIODARONE HYDROCHLORIDE 200 MG: 200 TABLET ORAL at 17:27

## 2025-08-23 RX ADMIN — ACETYLCYSTEINE 3 ML: 200 SOLUTION ORAL; RESPIRATORY (INHALATION) at 03:14

## 2025-08-23 RX ADMIN — ASPIRIN 81 MG: 81 TABLET, CHEWABLE ORAL at 04:46

## 2025-08-23 RX ADMIN — ACETYLCYSTEINE 3 ML: 200 SOLUTION ORAL; RESPIRATORY (INHALATION) at 10:53

## 2025-08-23 RX ADMIN — ARIPIPRAZOLE 10 MG: 10 TABLET ORAL at 04:46

## 2025-08-23 RX ADMIN — TAMSULOSIN HYDROCHLORIDE 0.4 MG: 0.4 CAPSULE ORAL at 08:40

## 2025-08-23 RX ADMIN — AMPICILLIN SODIUM, SULBACTAM SODIUM 3 G: 2; 1 INJECTION, POWDER, FOR SOLUTION INTRAMUSCULAR; INTRAVENOUS at 17:32

## 2025-08-23 RX ADMIN — ACETYLCYSTEINE 3 ML: 200 SOLUTION ORAL; RESPIRATORY (INHALATION) at 22:00

## 2025-08-23 RX ADMIN — ALBUTEROL SULFATE 2.5 MG: 2.5 SOLUTION RESPIRATORY (INHALATION) at 22:00

## 2025-08-23 RX ADMIN — ACETYLCYSTEINE 3 ML: 200 SOLUTION ORAL; RESPIRATORY (INHALATION) at 07:57

## 2025-08-23 RX ADMIN — ALBUTEROL SULFATE 2.5 MG: 2.5 SOLUTION RESPIRATORY (INHALATION) at 20:00

## 2025-08-23 RX ADMIN — ENOXAPARIN SODIUM 100 MG: 100 INJECTION SUBCUTANEOUS at 18:30

## 2025-08-23 RX ADMIN — FAMOTIDINE 20 MG: 20 TABLET, FILM COATED ORAL at 04:46

## 2025-08-23 RX ADMIN — ALBUTEROL SULFATE 2.5 MG: 2.5 SOLUTION RESPIRATORY (INHALATION) at 03:14

## 2025-08-23 RX ADMIN — POLYETHYLENE GLYCOL 3350 1 PACKET: 17 POWDER, FOR SOLUTION ORAL at 14:00

## 2025-08-23 RX ADMIN — DORZOLAMIDE HYDROCHLORIDE AND TIMOLOL MALEATE 1 DROP: 20; 5 SOLUTION/ DROPS OPHTHALMIC at 04:46

## 2025-08-23 RX ADMIN — AMPICILLIN SODIUM, SULBACTAM SODIUM 3 G: 2; 1 INJECTION, POWDER, FOR SOLUTION INTRAMUSCULAR; INTRAVENOUS at 04:47

## 2025-08-23 RX ADMIN — ALBUTEROL SULFATE 2.5 MG: 2.5 SOLUTION RESPIRATORY (INHALATION) at 10:53

## 2025-08-23 RX ADMIN — DORZOLAMIDE HYDROCHLORIDE AND TIMOLOL MALEATE 1 DROP: 20; 5 SOLUTION/ DROPS OPHTHALMIC at 17:27

## 2025-08-23 RX ADMIN — LEVOTHYROXINE SODIUM 25 MCG: 0.03 TABLET ORAL at 04:46

## 2025-08-23 RX ADMIN — ACETYLCYSTEINE 3 ML: 200 SOLUTION ORAL; RESPIRATORY (INHALATION) at 20:00

## 2025-08-23 RX ADMIN — ALLOPURINOL 300 MG: 300 TABLET ORAL at 04:46

## 2025-08-23 ASSESSMENT — ENCOUNTER SYMPTOMS
DEPRESSION: 0
MYALGIAS: 0
DIARRHEA: 0
PALPITATIONS: 0
FEVER: 0
VOMITING: 0
COUGH: 0
CHILLS: 0
DIZZINESS: 0
SORE THROAT: 0
ABDOMINAL PAIN: 0
SHORTNESS OF BREATH: 0
BACK PAIN: 1
WEAKNESS: 1

## 2025-08-23 ASSESSMENT — PATIENT HEALTH QUESTIONNAIRE - PHQ9
2. FEELING DOWN, DEPRESSED, IRRITABLE, OR HOPELESS: NOT AT ALL
1. LITTLE INTEREST OR PLEASURE IN DOING THINGS: NOT AT ALL
SUM OF ALL RESPONSES TO PHQ9 QUESTIONS 1 AND 2: 0
1. LITTLE INTEREST OR PLEASURE IN DOING THINGS: NOT AT ALL
2. FEELING DOWN, DEPRESSED, IRRITABLE, OR HOPELESS: NOT AT ALL
SUM OF ALL RESPONSES TO PHQ9 QUESTIONS 1 AND 2: 0

## 2025-08-23 ASSESSMENT — FIBROSIS 4 INDEX: FIB4 SCORE: 2.01

## 2025-08-23 ASSESSMENT — PAIN DESCRIPTION - PAIN TYPE
TYPE: ACUTE PAIN
TYPE: ACUTE PAIN

## 2025-08-24 ENCOUNTER — APPOINTMENT (OUTPATIENT)
Dept: RADIOLOGY | Facility: MEDICAL CENTER | Age: 75
End: 2025-08-24
Attending: INTERNAL MEDICINE
Payer: COMMERCIAL

## 2025-08-24 PROBLEM — J98.19 TRAPPED LUNG: Status: ACTIVE | Noted: 2025-08-24

## 2025-08-24 LAB
ALBUMIN SERPL BCP-MCNC: 2.4 G/DL (ref 3.2–4.9)
ALBUMIN/GLOB SERPL: 0.7 G/DL
ALP SERPL-CCNC: 119 U/L (ref 30–99)
ALT SERPL-CCNC: 15 U/L (ref 2–50)
ANION GAP SERPL CALC-SCNC: 8 MMOL/L (ref 7–16)
AST SERPL-CCNC: 29 U/L (ref 12–45)
BACTERIA BLD CULT: NORMAL
BACTERIA BLD CULT: NORMAL
BILIRUB SERPL-MCNC: 0.7 MG/DL (ref 0.1–1.5)
BUN SERPL-MCNC: 16 MG/DL (ref 8–22)
CALCIUM ALBUM COR SERPL-MCNC: 9.2 MG/DL (ref 8.5–10.5)
CALCIUM SERPL-MCNC: 7.9 MG/DL (ref 8.5–10.5)
CHLORIDE SERPL-SCNC: 105 MMOL/L (ref 96–112)
CO2 SERPL-SCNC: 21 MMOL/L (ref 20–33)
CREAT SERPL-MCNC: 1.15 MG/DL (ref 0.5–1.4)
ERYTHROCYTE [DISTWIDTH] IN BLOOD BY AUTOMATED COUNT: 50.5 FL (ref 35.9–50)
GFR SERPLBLD CREATININE-BSD FMLA CKD-EPI: 67 ML/MIN/1.73 M 2
GLOBULIN SER CALC-MCNC: 3.6 G/DL (ref 1.9–3.5)
GLUCOSE SERPL-MCNC: 99 MG/DL (ref 65–99)
HCT VFR BLD AUTO: 30 % (ref 42–52)
HGB BLD-MCNC: 9.6 G/DL (ref 14–18)
MCH RBC QN AUTO: 27.9 PG (ref 27–33)
MCHC RBC AUTO-ENTMCNC: 32 G/DL (ref 32.3–36.5)
MCV RBC AUTO: 87.2 FL (ref 81.4–97.8)
PLATELET # BLD AUTO: 253 K/UL (ref 164–446)
PMV BLD AUTO: 9.6 FL (ref 9–12.9)
POTASSIUM SERPL-SCNC: 3.9 MMOL/L (ref 3.6–5.5)
PROT SERPL-MCNC: 6 G/DL (ref 6–8.2)
RBC # BLD AUTO: 3.44 M/UL (ref 4.7–6.1)
SIGNIFICANT IND 70042: NORMAL
SIGNIFICANT IND 70042: NORMAL
SITE SITE: NORMAL
SITE SITE: NORMAL
SODIUM SERPL-SCNC: 134 MMOL/L (ref 135–145)
SOURCE SOURCE: NORMAL
SOURCE SOURCE: NORMAL
WBC # BLD AUTO: 7.3 K/UL (ref 4.8–10.8)

## 2025-08-24 PROCEDURE — 71045 X-RAY EXAM CHEST 1 VIEW: CPT

## 2025-08-24 PROCEDURE — 700111 HCHG RX REV CODE 636 W/ 250 OVERRIDE (IP): Mod: JZ | Performed by: INTERNAL MEDICINE

## 2025-08-24 PROCEDURE — 700111 HCHG RX REV CODE 636 W/ 250 OVERRIDE (IP): Performed by: STUDENT IN AN ORGANIZED HEALTH CARE EDUCATION/TRAINING PROGRAM

## 2025-08-24 PROCEDURE — 700102 HCHG RX REV CODE 250 W/ 637 OVERRIDE(OP): Performed by: STUDENT IN AN ORGANIZED HEALTH CARE EDUCATION/TRAINING PROGRAM

## 2025-08-24 PROCEDURE — 700102 HCHG RX REV CODE 250 W/ 637 OVERRIDE(OP)

## 2025-08-24 PROCEDURE — 99233 SBSQ HOSP IP/OBS HIGH 50: CPT | Performed by: INTERNAL MEDICINE

## 2025-08-24 PROCEDURE — A9270 NON-COVERED ITEM OR SERVICE: HCPCS

## 2025-08-24 PROCEDURE — A9270 NON-COVERED ITEM OR SERVICE: HCPCS | Performed by: INTERNAL MEDICINE

## 2025-08-24 PROCEDURE — C1729 CATH, DRAINAGE: HCPCS | Performed by: STUDENT IN AN ORGANIZED HEALTH CARE EDUCATION/TRAINING PROGRAM

## 2025-08-24 PROCEDURE — 700102 HCHG RX REV CODE 250 W/ 637 OVERRIDE(OP): Performed by: INTERNAL MEDICINE

## 2025-08-24 PROCEDURE — 94640 AIRWAY INHALATION TREATMENT: CPT

## 2025-08-24 PROCEDURE — 700101 HCHG RX REV CODE 250: Performed by: STUDENT IN AN ORGANIZED HEALTH CARE EDUCATION/TRAINING PROGRAM

## 2025-08-24 PROCEDURE — 85027 COMPLETE CBC AUTOMATED: CPT

## 2025-08-24 PROCEDURE — 700101 HCHG RX REV CODE 250

## 2025-08-24 PROCEDURE — 99233 SBSQ HOSP IP/OBS HIGH 50: CPT | Performed by: STUDENT IN AN ORGANIZED HEALTH CARE EDUCATION/TRAINING PROGRAM

## 2025-08-24 PROCEDURE — 80053 COMPREHEN METABOLIC PANEL: CPT

## 2025-08-24 PROCEDURE — 94669 MECHANICAL CHEST WALL OSCILL: CPT

## 2025-08-24 PROCEDURE — 700105 HCHG RX REV CODE 258: Performed by: INTERNAL MEDICINE

## 2025-08-24 PROCEDURE — A9270 NON-COVERED ITEM OR SERVICE: HCPCS | Performed by: STUDENT IN AN ORGANIZED HEALTH CARE EDUCATION/TRAINING PROGRAM

## 2025-08-24 PROCEDURE — 770020 HCHG ROOM/CARE - TELE (206)

## 2025-08-24 PROCEDURE — 36415 COLL VENOUS BLD VENIPUNCTURE: CPT

## 2025-08-24 RX ADMIN — TAMSULOSIN HYDROCHLORIDE 0.4 MG: 0.4 CAPSULE ORAL at 08:56

## 2025-08-24 RX ADMIN — AMPICILLIN SODIUM, SULBACTAM SODIUM 3 G: 2; 1 INJECTION, POWDER, FOR SOLUTION INTRAMUSCULAR; INTRAVENOUS at 17:34

## 2025-08-24 RX ADMIN — ALBUTEROL SULFATE 2.5 MG: 2.5 SOLUTION RESPIRATORY (INHALATION) at 15:00

## 2025-08-24 RX ADMIN — ALLOPURINOL 300 MG: 300 TABLET ORAL at 05:08

## 2025-08-24 RX ADMIN — ACETYLCYSTEINE 3 ML: 200 SOLUTION ORAL; RESPIRATORY (INHALATION) at 10:12

## 2025-08-24 RX ADMIN — AMPICILLIN SODIUM, SULBACTAM SODIUM 3 G: 2; 1 INJECTION, POWDER, FOR SOLUTION INTRAMUSCULAR; INTRAVENOUS at 12:38

## 2025-08-24 RX ADMIN — FAMOTIDINE 20 MG: 20 TABLET, FILM COATED ORAL at 05:08

## 2025-08-24 RX ADMIN — DORZOLAMIDE HYDROCHLORIDE AND TIMOLOL MALEATE 1 DROP: 20; 5 SOLUTION/ DROPS OPHTHALMIC at 17:35

## 2025-08-24 RX ADMIN — ALBUTEROL SULFATE 2.5 MG: 2.5 SOLUTION RESPIRATORY (INHALATION) at 10:12

## 2025-08-24 RX ADMIN — ACETYLCYSTEINE 3 ML: 200 SOLUTION ORAL; RESPIRATORY (INHALATION) at 14:42

## 2025-08-24 RX ADMIN — VIBEGRON 75 MG: 75 TABLET, FILM COATED ORAL at 17:34

## 2025-08-24 RX ADMIN — ACETYLCYSTEINE 3 ML: 200 SOLUTION ORAL; RESPIRATORY (INHALATION) at 19:39

## 2025-08-24 RX ADMIN — AMPICILLIN SODIUM, SULBACTAM SODIUM 3 G: 2; 1 INJECTION, POWDER, FOR SOLUTION INTRAMUSCULAR; INTRAVENOUS at 05:06

## 2025-08-24 RX ADMIN — AMIODARONE HYDROCHLORIDE 200 MG: 200 TABLET ORAL at 17:34

## 2025-08-24 RX ADMIN — ASPIRIN 81 MG: 81 TABLET, CHEWABLE ORAL at 05:08

## 2025-08-24 RX ADMIN — ENOXAPARIN SODIUM 100 MG: 100 INJECTION SUBCUTANEOUS at 05:08

## 2025-08-24 RX ADMIN — ALBUTEROL SULFATE 2.5 MG: 2.5 SOLUTION RESPIRATORY (INHALATION) at 06:45

## 2025-08-24 RX ADMIN — ARIPIPRAZOLE 10 MG: 10 TABLET ORAL at 05:08

## 2025-08-24 RX ADMIN — ALBUTEROL SULFATE 2.5 MG: 2.5 SOLUTION RESPIRATORY (INHALATION) at 19:40

## 2025-08-24 RX ADMIN — POLYETHYLENE GLYCOL 3350 1 PACKET: 17 POWDER, FOR SOLUTION ORAL at 05:09

## 2025-08-24 RX ADMIN — ENOXAPARIN SODIUM 100 MG: 100 INJECTION SUBCUTANEOUS at 17:35

## 2025-08-24 RX ADMIN — AMPICILLIN SODIUM, SULBACTAM SODIUM 3 G: 2; 1 INJECTION, POWDER, FOR SOLUTION INTRAMUSCULAR; INTRAVENOUS at 00:27

## 2025-08-24 RX ADMIN — LEVOTHYROXINE SODIUM 25 MCG: 0.03 TABLET ORAL at 05:08

## 2025-08-24 RX ADMIN — ACETYLCYSTEINE 3 ML: 200 SOLUTION ORAL; RESPIRATORY (INHALATION) at 06:45

## 2025-08-24 RX ADMIN — AMPICILLIN SODIUM, SULBACTAM SODIUM 3 G: 2; 1 INJECTION, POWDER, FOR SOLUTION INTRAMUSCULAR; INTRAVENOUS at 23:52

## 2025-08-24 RX ADMIN — DORZOLAMIDE HYDROCHLORIDE AND TIMOLOL MALEATE 1 DROP: 20; 5 SOLUTION/ DROPS OPHTHALMIC at 05:08

## 2025-08-24 RX ADMIN — LATANOPROST 1 DROP: 50 SOLUTION OPHTHALMIC at 21:50

## 2025-08-24 ASSESSMENT — ENCOUNTER SYMPTOMS
DIZZINESS: 0
CHILLS: 0
DIARRHEA: 0
MYALGIAS: 0
COUGH: 0
DEPRESSION: 0
SORE THROAT: 0
ABDOMINAL PAIN: 0
SHORTNESS OF BREATH: 0
FEVER: 0
BACK PAIN: 1
VOMITING: 0
PALPITATIONS: 0
WEAKNESS: 1

## 2025-08-24 ASSESSMENT — FIBROSIS 4 INDEX: FIB4 SCORE: 2.16

## 2025-08-24 ASSESSMENT — PAIN DESCRIPTION - PAIN TYPE: TYPE: ACUTE PAIN

## 2025-08-25 ENCOUNTER — APPOINTMENT (OUTPATIENT)
Dept: CARDIOLOGY | Facility: MEDICAL CENTER | Age: 75
End: 2025-08-25
Attending: STUDENT IN AN ORGANIZED HEALTH CARE EDUCATION/TRAINING PROGRAM
Payer: COMMERCIAL

## 2025-08-25 ENCOUNTER — ANESTHESIA (OUTPATIENT)
Dept: SURGERY | Facility: MEDICAL CENTER | Age: 75
End: 2025-08-25
Payer: COMMERCIAL

## 2025-08-25 ENCOUNTER — ANESTHESIA EVENT (OUTPATIENT)
Dept: SURGERY | Facility: MEDICAL CENTER | Age: 75
End: 2025-08-25
Payer: COMMERCIAL

## 2025-08-25 LAB
ABO + RH BLD: NORMAL
ABO GROUP BLD: NORMAL
ACTION RANGE TRIGGERED IACRT: NO
ACTION RANGE TRIGGERED IACRT: YES
ALBUMIN SERPL BCP-MCNC: 2.1 G/DL (ref 3.2–4.9)
ALBUMIN SERPL BCP-MCNC: 2.4 G/DL (ref 3.2–4.9)
ALBUMIN/GLOB SERPL: 0.6 G/DL
ALBUMIN/GLOB SERPL: 1 G/DL
ALP SERPL-CCNC: 123 U/L (ref 30–99)
ALP SERPL-CCNC: 67 U/L (ref 30–99)
ALT SERPL-CCNC: 13 U/L (ref 2–50)
ALT SERPL-CCNC: 15 U/L (ref 2–50)
ANION GAP SERPL CALC-SCNC: 13 MMOL/L (ref 7–16)
ANION GAP SERPL CALC-SCNC: 9 MMOL/L (ref 7–16)
ANION GAP SERPL CALC-SCNC: 9 MMOL/L (ref 7–16)
APTT PPP: 39.8 SEC (ref 24.7–36)
AST SERPL-CCNC: 26 U/L (ref 12–45)
AST SERPL-CCNC: 29 U/L (ref 12–45)
BARCODED ABORH UBTYP: 1700
BARCODED ABORH UBTYP: 5100
BARCODED ABORH UBTYP: 7300
BARCODED ABORH UBTYP: 7300
BARCODED PRD CODE UBPRD: NORMAL
BARCODED UNIT NUM UBUNT: NORMAL
BASE EXCESS BLDA CALC-SCNC: -14 MMOL/L (ref -4–3)
BASE EXCESS BLDA CALC-SCNC: -7 MMOL/L (ref -4–3)
BASOPHILS # BLD AUTO: 0.4 % (ref 0–1.8)
BASOPHILS # BLD: 0.09 K/UL (ref 0–0.12)
BILIRUB SERPL-MCNC: 0.6 MG/DL (ref 0.1–1.5)
BILIRUB SERPL-MCNC: 1.9 MG/DL (ref 0.1–1.5)
BLD GP AB SCN SERPL QL: NORMAL
BREATHS SETTING VENT: 20
BREATHS SETTING VENT: 20
BUN SERPL-MCNC: 13 MG/DL (ref 8–22)
BUN SERPL-MCNC: 13 MG/DL (ref 8–22)
BUN SERPL-MCNC: 14 MG/DL (ref 8–22)
CALCIUM ALBUM COR SERPL-MCNC: 8.9 MG/DL (ref 8.5–10.5)
CALCIUM ALBUM COR SERPL-MCNC: 9.3 MG/DL (ref 8.5–10.5)
CALCIUM SERPL-MCNC: 7.3 MG/DL (ref 8.5–10.5)
CALCIUM SERPL-MCNC: 7.4 MG/DL (ref 8.5–10.5)
CALCIUM SERPL-MCNC: 8 MG/DL (ref 8.5–10.5)
CFT BLD TEG: 6.4 MIN (ref 4.6–9.1)
CFT P HPASE BLD TEG: 5.5 MIN (ref 4.3–8.3)
CHLORIDE SERPL-SCNC: 105 MMOL/L (ref 96–112)
CHLORIDE SERPL-SCNC: 111 MMOL/L (ref 96–112)
CHLORIDE SERPL-SCNC: 112 MMOL/L (ref 96–112)
CLOT ANGLE BLD TEG: 79.1 DEGREES (ref 63–78)
CO2 BLDA-SCNC: 16 MMOL/L (ref 20–33)
CO2 BLDA-SCNC: 20 MMOL/L (ref 20–33)
CO2 SERPL-SCNC: 14 MMOL/L (ref 20–33)
CO2 SERPL-SCNC: 17 MMOL/L (ref 20–33)
CO2 SERPL-SCNC: 21 MMOL/L (ref 20–33)
COMPONENT F 8504F: NORMAL
COMPONENT R 8504R: NORMAL
CREAT SERPL-MCNC: 1.14 MG/DL (ref 0.5–1.4)
CREAT SERPL-MCNC: 1.18 MG/DL (ref 0.5–1.4)
CREAT SERPL-MCNC: 1.29 MG/DL (ref 0.5–1.4)
CT.EXTRINSIC BLD ROTEM: 0.8 MIN (ref 0.8–2.1)
DELSYS IDSYS: ABNORMAL
DELSYS IDSYS: ABNORMAL
EOSINOPHIL # BLD AUTO: 0.14 K/UL (ref 0–0.51)
EOSINOPHIL NFR BLD: 0.6 % (ref 0–6.9)
ERYTHROCYTE [DISTWIDTH] IN BLOOD BY AUTOMATED COUNT: 46 FL (ref 35.9–50)
GFR SERPLBLD CREATININE-BSD FMLA CKD-EPI: 58 ML/MIN/1.73 M 2
GFR SERPLBLD CREATININE-BSD FMLA CKD-EPI: 65 ML/MIN/1.73 M 2
GFR SERPLBLD CREATININE-BSD FMLA CKD-EPI: 68 ML/MIN/1.73 M 2
GLOBULIN SER CALC-MCNC: 2.1 G/DL (ref 1.9–3.5)
GLOBULIN SER CALC-MCNC: 3.8 G/DL (ref 1.9–3.5)
GLUCOSE BLD STRIP.AUTO-MCNC: 180 MG/DL (ref 65–99)
GLUCOSE SERPL-MCNC: 216 MG/DL (ref 65–99)
GLUCOSE SERPL-MCNC: 261 MG/DL (ref 65–99)
GLUCOSE SERPL-MCNC: 92 MG/DL (ref 65–99)
HCO3 BLDA-SCNC: 15 MMOL/L (ref 21–28)
HCO3 BLDA-SCNC: 19 MMOL/L (ref 21–28)
HCT VFR BLD AUTO: 27.7 % (ref 42–52)
HCT VFR BLD AUTO: 30.7 % (ref 42–52)
HGB BLD-MCNC: 10 G/DL (ref 14–18)
HGB BLD-MCNC: 8.9 G/DL (ref 14–18)
IMM GRANULOCYTES # BLD AUTO: 0.73 K/UL (ref 0–0.11)
IMM GRANULOCYTES NFR BLD AUTO: 2.9 % (ref 0–0.9)
INR PPP: 1.64 (ref 0.87–1.13)
INST. QUALIFIED PATIENT IIQPT: YES
INST. QUALIFIED PATIENT IIQPT: YES
LACTATE BLD-SCNC: 3.21 MMOL/L (ref 0.5–2)
LACTATE BLD-SCNC: 5.24 MMOL/L (ref 0.5–2)
LMWH PPP CHRO-ACNC: <0.1 U/ML
LV EJECT FRACT  99904: 60
LYMPHOCYTES # BLD AUTO: 3.95 K/UL (ref 1–4.8)
LYMPHOCYTES NFR BLD: 15.9 % (ref 22–41)
Lab: ABNORMAL
Lab: ABNORMAL
MCF BLD TEG: 70.1 MM (ref 52–69)
MCF.PLATELET INHIB BLD ROTEM: 43.6 MM (ref 15–32)
MCH RBC QN AUTO: 28 PG (ref 27–33)
MCHC RBC AUTO-ENTMCNC: 32.6 G/DL (ref 32.3–36.5)
MCV RBC AUTO: 86 FL (ref 81.4–97.8)
MODE IMODE: ABNORMAL
MODE IMODE: ABNORMAL
MONOCYTES # BLD AUTO: 1.74 K/UL (ref 0–0.85)
MONOCYTES NFR BLD AUTO: 7 % (ref 0–13.4)
NEUTROPHILS # BLD AUTO: 18.22 K/UL (ref 1.82–7.42)
NEUTROPHILS NFR BLD: 73.2 % (ref 44–72)
NRBC # BLD AUTO: 0.04 K/UL
NRBC BLD-RTO: 0.2 /100 WBC (ref 0–0.2)
O2/TOTAL GAS SETTING VFR VENT: 100 %
O2/TOTAL GAS SETTING VFR VENT: 100 %
O2/TOTAL GAS SETTING VFR VENT: 80 %
O2/TOTAL GAS SETTING VFR VENT: 80 %
PA AA BLD-ACNC: ABNORMAL % (ref 0–11)
PA ADP BLD-ACNC: ABNORMAL % (ref 0–17)
PCO2 BLDA: 38 MMHG (ref 32–48)
PCO2 BLDA: 46 MMHG (ref 32–48)
PCO2 TEMP ADJ BLDA: 38 MMHG (ref 32–48)
PCO2 TEMP ADJ BLDA: 43 MMHG (ref 32–48)
PEEP END EXPIRATORY PRESSURE IPEEP: 8
PEEP END EXPIRATORY PRESSURE IPEEP: 8
PH BLDA: 7.12 [PH] (ref 7.35–7.45)
PH BLDA: 7.3 [PH] (ref 7.35–7.45)
PH TEMP ADJ BLDA: 7.14 [PH] (ref 7.35–7.45)
PH TEMP ADJ BLDA: 7.3 [PH] (ref 7.35–7.45)
PLATELET # BLD AUTO: 364 K/UL (ref 164–446)
PMV BLD AUTO: 9.6 FL (ref 9–12.9)
PO2 BLDA: 62 MMHG (ref 83–108)
PO2 BLDA: 66 MMHG (ref 83–108)
PO2 TEMP ADJ BLDA: 59 MMHG (ref 83–108)
PO2 TEMP ADJ BLDA: 61 MMHG (ref 83–108)
POTASSIUM SERPL-SCNC: 4.2 MMOL/L (ref 3.6–5.5)
POTASSIUM SERPL-SCNC: 4.9 MMOL/L (ref 3.6–5.5)
POTASSIUM SERPL-SCNC: 5.8 MMOL/L (ref 3.6–5.5)
PRODUCT TYPE UPROD: NORMAL
PROT SERPL-MCNC: 4.2 G/DL (ref 6–8.2)
PROT SERPL-MCNC: 6.2 G/DL (ref 6–8.2)
PROTHROMBIN TIME: 19.5 SEC (ref 12–14.6)
RBC # BLD AUTO: 3.57 M/UL (ref 4.7–6.1)
RH BLD: NORMAL
SAO2 % BLDA: 85 % (ref 93–99)
SAO2 % BLDA: 89 % (ref 93–99)
SODIUM SERPL-SCNC: 135 MMOL/L (ref 135–145)
SODIUM SERPL-SCNC: 137 MMOL/L (ref 135–145)
SODIUM SERPL-SCNC: 139 MMOL/L (ref 135–145)
SPECIMEN DRAWN FROM PATIENT: ABNORMAL
SPECIMEN DRAWN FROM PATIENT: ABNORMAL
TEG ALGORITHM TGALG: ABNORMAL
TIDAL VOLUME IVT: 400
TIDAL VOLUME IVT: 400
UNIT STATUS USTAT: NORMAL
WBC # BLD AUTO: 24.9 K/UL (ref 4.8–10.8)

## 2025-08-25 PROCEDURE — A9270 NON-COVERED ITEM OR SERVICE: HCPCS

## 2025-08-25 PROCEDURE — 85018 HEMOGLOBIN: CPT

## 2025-08-25 PROCEDURE — 85610 PROTHROMBIN TIME: CPT

## 2025-08-25 PROCEDURE — 36556 INSERT NON-TUNNEL CV CATH: CPT | Performed by: INTERNAL MEDICINE

## 2025-08-25 PROCEDURE — 160015 HCHG STAT PREOP MINUTES: Performed by: SURGERY

## 2025-08-25 PROCEDURE — 770022 HCHG ROOM/CARE - ICU (200)

## 2025-08-25 PROCEDURE — 99232 SBSQ HOSP IP/OBS MODERATE 35: CPT | Mod: 25 | Performed by: STUDENT IN AN ORGANIZED HEALTH CARE EDUCATION/TRAINING PROGRAM

## 2025-08-25 PROCEDURE — 700105 HCHG RX REV CODE 258: Performed by: INTERNAL MEDICINE

## 2025-08-25 PROCEDURE — 83605 ASSAY OF LACTIC ACID: CPT

## 2025-08-25 PROCEDURE — 99233 SBSQ HOSP IP/OBS HIGH 50: CPT | Performed by: STUDENT IN AN ORGANIZED HEALTH CARE EDUCATION/TRAINING PROGRAM

## 2025-08-25 PROCEDURE — 37799 UNLISTED PX VASCULAR SURGERY: CPT

## 2025-08-25 PROCEDURE — 94669 MECHANICAL CHEST WALL OSCILL: CPT

## 2025-08-25 PROCEDURE — A9270 NON-COVERED ITEM OR SERVICE: HCPCS | Performed by: INTERNAL MEDICINE

## 2025-08-25 PROCEDURE — 85520 HEPARIN ASSAY: CPT

## 2025-08-25 PROCEDURE — 80048 BASIC METABOLIC PNL TOTAL CA: CPT

## 2025-08-25 PROCEDURE — 85384 FIBRINOGEN ACTIVITY: CPT

## 2025-08-25 PROCEDURE — 86900 BLOOD TYPING SEROLOGIC ABO: CPT

## 2025-08-25 PROCEDURE — 700111 HCHG RX REV CODE 636 W/ 250 OVERRIDE (IP): Mod: JZ | Performed by: ANESTHESIOLOGY

## 2025-08-25 PROCEDURE — 80053 COMPREHEN METABOLIC PANEL: CPT

## 2025-08-25 PROCEDURE — 700102 HCHG RX REV CODE 250 W/ 637 OVERRIDE(OP): Performed by: INTERNAL MEDICINE

## 2025-08-25 PROCEDURE — 700111 HCHG RX REV CODE 636 W/ 250 OVERRIDE (IP): Mod: JZ | Performed by: INTERNAL MEDICINE

## 2025-08-25 PROCEDURE — 160048 HCHG OR STATISTICAL LEVEL 1-5: Performed by: SURGERY

## 2025-08-25 PROCEDURE — 94002 VENT MGMT INPAT INIT DAY: CPT

## 2025-08-25 PROCEDURE — C1729 CATH, DRAINAGE: HCPCS | Performed by: SURGERY

## 2025-08-25 PROCEDURE — 88305 TISSUE EXAM BY PATHOLOGIST: CPT | Performed by: PATHOLOGY

## 2025-08-25 PROCEDURE — 93306 TTE W/DOPPLER COMPLETE: CPT

## 2025-08-25 PROCEDURE — 82962 GLUCOSE BLOOD TEST: CPT | Performed by: INTERNAL MEDICINE

## 2025-08-25 PROCEDURE — 86901 BLOOD TYPING SEROLOGIC RH(D): CPT

## 2025-08-25 PROCEDURE — 160192 HCHG ANESTHESIA COMPLEX: Performed by: SURGERY

## 2025-08-25 PROCEDURE — 700101 HCHG RX REV CODE 250: Performed by: STUDENT IN AN ORGANIZED HEALTH CARE EDUCATION/TRAINING PROGRAM

## 2025-08-25 PROCEDURE — 99292 CRITICAL CARE ADDL 30 MIN: CPT | Mod: 25 | Performed by: INTERNAL MEDICINE

## 2025-08-25 PROCEDURE — 94640 AIRWAY INHALATION TREATMENT: CPT

## 2025-08-25 PROCEDURE — 700102 HCHG RX REV CODE 250 W/ 637 OVERRIDE(OP): Performed by: STUDENT IN AN ORGANIZED HEALTH CARE EDUCATION/TRAINING PROGRAM

## 2025-08-25 PROCEDURE — 700111 HCHG RX REV CODE 636 W/ 250 OVERRIDE (IP): Performed by: ANESTHESIOLOGY

## 2025-08-25 PROCEDURE — 700117 HCHG RX CONTRAST REV CODE 255: Performed by: STUDENT IN AN ORGANIZED HEALTH CARE EDUCATION/TRAINING PROGRAM

## 2025-08-25 PROCEDURE — 86923 COMPATIBILITY TEST ELECTRIC: CPT | Mod: 91

## 2025-08-25 PROCEDURE — P9017 PLASMA 1 DONOR FRZ W/IN 8 HR: HCPCS | Mod: 91

## 2025-08-25 PROCEDURE — 83605 ASSAY OF LACTIC ACID: CPT | Performed by: INTERNAL MEDICINE

## 2025-08-25 PROCEDURE — 85025 COMPLETE CBC W/AUTO DIFF WBC: CPT

## 2025-08-25 PROCEDURE — 99291 CRITICAL CARE FIRST HOUR: CPT | Mod: 25 | Performed by: INTERNAL MEDICINE

## 2025-08-25 PROCEDURE — 99233 SBSQ HOSP IP/OBS HIGH 50: CPT | Performed by: INTERNAL MEDICINE

## 2025-08-25 PROCEDURE — 700101 HCHG RX REV CODE 250

## 2025-08-25 PROCEDURE — P9034 PLATELETS, PHERESIS: HCPCS

## 2025-08-25 PROCEDURE — 700105 HCHG RX REV CODE 258: Performed by: ANESTHESIOLOGY

## 2025-08-25 PROCEDURE — 85014 HEMATOCRIT: CPT

## 2025-08-25 PROCEDURE — 700111 HCHG RX REV CODE 636 W/ 250 OVERRIDE (IP): Performed by: STUDENT IN AN ORGANIZED HEALTH CARE EDUCATION/TRAINING PROGRAM

## 2025-08-25 PROCEDURE — P9016 RBC LEUKOCYTES REDUCED: HCPCS | Mod: 91

## 2025-08-25 PROCEDURE — 700101 HCHG RX REV CODE 250: Performed by: SURGERY

## 2025-08-25 PROCEDURE — A9270 NON-COVERED ITEM OR SERVICE: HCPCS | Performed by: STUDENT IN AN ORGANIZED HEALTH CARE EDUCATION/TRAINING PROGRAM

## 2025-08-25 PROCEDURE — 36430 TRANSFUSION BLD/BLD COMPNT: CPT

## 2025-08-25 PROCEDURE — 82803 BLOOD GASES ANY COMBINATION: CPT | Performed by: INTERNAL MEDICINE

## 2025-08-25 PROCEDURE — 700102 HCHG RX REV CODE 250 W/ 637 OVERRIDE(OP)

## 2025-08-25 PROCEDURE — 86850 RBC ANTIBODY SCREEN: CPT

## 2025-08-25 PROCEDURE — 93306 TTE W/DOPPLER COMPLETE: CPT | Mod: 26 | Performed by: INTERNAL MEDICINE

## 2025-08-25 PROCEDURE — 85347 COAGULATION TIME ACTIVATED: CPT

## 2025-08-25 PROCEDURE — 85730 THROMBOPLASTIN TIME PARTIAL: CPT

## 2025-08-25 PROCEDURE — 700101 HCHG RX REV CODE 250: Performed by: INTERNAL MEDICINE

## 2025-08-25 PROCEDURE — 700101 HCHG RX REV CODE 250: Performed by: ANESTHESIOLOGY

## 2025-08-25 PROCEDURE — 85576 BLOOD PLATELET AGGREGATION: CPT | Mod: 91

## 2025-08-25 PROCEDURE — 160041 HCHG SURGERY MINUTES - EA ADDL 1 MIN LEVEL 4: Performed by: SURGERY

## 2025-08-25 PROCEDURE — 160029 HCHG SURGERY MINUTES - 1ST 30 MINS LEVEL 4: Performed by: SURGERY

## 2025-08-25 PROCEDURE — 88305 TISSUE EXAM BY PATHOLOGIST: CPT | Mod: 26 | Performed by: PATHOLOGY

## 2025-08-25 RX ORDER — LIDOCAINE HYDROCHLORIDE 40 MG/ML
SOLUTION TOPICAL PRN
Status: DISCONTINUED | OUTPATIENT
Start: 2025-08-25 | End: 2025-08-25 | Stop reason: SURG

## 2025-08-25 RX ORDER — PHENYLEPHRINE HCL IN 0.9% NACL 1 MG/10 ML
200 SYRINGE (ML) INTRAVENOUS
Status: ACTIVE | OUTPATIENT
Start: 2025-08-25 | End: 2025-08-25

## 2025-08-25 RX ORDER — POLYETHYLENE GLYCOL 3350 17 G/17G
1 POWDER, FOR SOLUTION ORAL DAILY
Status: DISPENSED | OUTPATIENT
Start: 2025-08-26

## 2025-08-25 RX ORDER — SODIUM CHLORIDE, SODIUM GLUCONATE, SODIUM ACETATE, POTASSIUM CHLORIDE AND MAGNESIUM CHLORIDE 526; 502; 368; 37; 30 MG/100ML; MG/100ML; MG/100ML; MG/100ML; MG/100ML
INJECTION, SOLUTION INTRAVENOUS
Status: DISCONTINUED | OUTPATIENT
Start: 2025-08-25 | End: 2025-08-25 | Stop reason: SURG

## 2025-08-25 RX ORDER — AMIODARONE HYDROCHLORIDE 200 MG/1
200 TABLET ORAL DAILY
Status: DISPENSED | OUTPATIENT
Start: 2025-08-26

## 2025-08-25 RX ORDER — ALLOPURINOL 300 MG/1
300 TABLET ORAL DAILY
Status: DISPENSED | OUTPATIENT
Start: 2025-08-26

## 2025-08-25 RX ORDER — PHENYLEPHRINE HYDROCHLORIDE 10 MG/ML
INJECTION, SOLUTION INTRAMUSCULAR; INTRAVENOUS; SUBCUTANEOUS
Status: COMPLETED
Start: 2025-08-25 | End: 2025-08-25

## 2025-08-25 RX ORDER — ASPIRIN 81 MG/1
81 TABLET, CHEWABLE ORAL DAILY
Status: DISPENSED | OUTPATIENT
Start: 2025-08-26

## 2025-08-25 RX ORDER — NOREPINEPHRINE BITARTRATE 0.03 MG/ML
0-1 INJECTION, SOLUTION INTRAVENOUS CONTINUOUS
Status: DISCONTINUED | OUTPATIENT
Start: 2025-08-25 | End: 2025-08-28

## 2025-08-25 RX ORDER — CEFAZOLIN SODIUM 1 G/3ML
INJECTION, POWDER, FOR SOLUTION INTRAMUSCULAR; INTRAVENOUS PRN
Status: DISCONTINUED | OUTPATIENT
Start: 2025-08-25 | End: 2025-08-25 | Stop reason: SURG

## 2025-08-25 RX ORDER — SODIUM CHLORIDE 9 MG/ML
1000 INJECTION, SOLUTION INTRAVENOUS ONCE
Status: DISCONTINUED | OUTPATIENT
Start: 2025-08-25 | End: 2025-08-26

## 2025-08-25 RX ORDER — DEXMEDETOMIDINE HYDROCHLORIDE 4 UG/ML
0-1.5 INJECTION, SOLUTION INTRAVENOUS CONTINUOUS
Status: DISCONTINUED | OUTPATIENT
Start: 2025-08-25 | End: 2025-08-26

## 2025-08-25 RX ORDER — LEVOTHYROXINE SODIUM 25 UG/1
25 TABLET ORAL
Status: DISPENSED | OUTPATIENT
Start: 2025-08-26

## 2025-08-25 RX ORDER — BISACODYL 10 MG
10 SUPPOSITORY, RECTAL RECTAL
Status: ACTIVE | OUTPATIENT
Start: 2025-08-25

## 2025-08-25 RX ORDER — AMOXICILLIN 250 MG
2 CAPSULE ORAL 2 TIMES DAILY
Status: DISPENSED | OUTPATIENT
Start: 2025-08-25

## 2025-08-25 RX ORDER — POLYETHYLENE GLYCOL 3350 17 G/17G
1 POWDER, FOR SOLUTION ORAL
Status: ACTIVE | OUTPATIENT
Start: 2025-08-25

## 2025-08-25 RX ORDER — EPHEDRINE SULFATE 50 MG/ML
INJECTION, SOLUTION INTRAVENOUS PRN
Status: DISCONTINUED | OUTPATIENT
Start: 2025-08-25 | End: 2025-08-25 | Stop reason: SURG

## 2025-08-25 RX ORDER — FAMOTIDINE 20 MG/1
20 TABLET, FILM COATED ORAL EVERY 12 HOURS
Status: DISCONTINUED | OUTPATIENT
Start: 2025-08-25 | End: 2025-08-31

## 2025-08-25 RX ORDER — ROCURONIUM BROMIDE 10 MG/ML
INJECTION, SOLUTION INTRAVENOUS PRN
Status: DISCONTINUED | OUTPATIENT
Start: 2025-08-25 | End: 2025-08-25 | Stop reason: SURG

## 2025-08-25 RX ORDER — CALCIUM CHLORIDE 100 MG/ML
INJECTION INTRAVENOUS; INTRAVENTRICULAR PRN
Status: DISCONTINUED | OUTPATIENT
Start: 2025-08-25 | End: 2025-08-25 | Stop reason: SURG

## 2025-08-25 RX ORDER — DEXMEDETOMIDINE HYDROCHLORIDE 4 UG/ML
0-1.5 INJECTION, SOLUTION INTRAVENOUS CONTINUOUS
Status: DISCONTINUED | OUTPATIENT
Start: 2025-08-25 | End: 2025-08-25

## 2025-08-25 RX ORDER — BUPIVACAINE HYDROCHLORIDE AND EPINEPHRINE 5; 5 MG/ML; UG/ML
INJECTION, SOLUTION EPIDURAL; INTRACAUDAL; PERINEURAL
Status: DISCONTINUED | OUTPATIENT
Start: 2025-08-25 | End: 2025-08-25 | Stop reason: HOSPADM

## 2025-08-25 RX ORDER — EPINEPHRINE HCL IN 0.9 % NACL 4MG/250ML
0-.5 PLASTIC BAG, INJECTION (ML) INTRAVENOUS CONTINUOUS
Status: DISCONTINUED | OUTPATIENT
Start: 2025-08-25 | End: 2025-08-26

## 2025-08-25 RX ORDER — SODIUM CHLORIDE, SODIUM LACTATE, POTASSIUM CHLORIDE, AND CALCIUM CHLORIDE .6; .31; .03; .02 G/100ML; G/100ML; G/100ML; G/100ML
1000 INJECTION, SOLUTION INTRAVENOUS ONCE
Status: COMPLETED | OUTPATIENT
Start: 2025-08-25 | End: 2025-08-25

## 2025-08-25 RX ORDER — HYDROCORTISONE SODIUM SUCCINATE 100 MG/2ML
100 INJECTION INTRAMUSCULAR; INTRAVENOUS EVERY 8 HOURS
Status: DISCONTINUED | OUTPATIENT
Start: 2025-08-25 | End: 2025-08-28

## 2025-08-25 RX ORDER — ARIPIPRAZOLE 10 MG/1
10 TABLET ORAL DAILY
Status: DISPENSED | OUTPATIENT
Start: 2025-08-26

## 2025-08-25 RX ORDER — SODIUM CHLORIDE, SODIUM LACTATE, POTASSIUM CHLORIDE, CALCIUM CHLORIDE 600; 310; 30; 20 MG/100ML; MG/100ML; MG/100ML; MG/100ML
INJECTION, SOLUTION INTRAVENOUS
Status: DISCONTINUED | OUTPATIENT
Start: 2025-08-25 | End: 2025-08-25 | Stop reason: SURG

## 2025-08-25 RX ORDER — PHENYLEPHRINE HYDROCHLORIDE 10 MG/ML
INJECTION, SOLUTION INTRAMUSCULAR; INTRAVENOUS; SUBCUTANEOUS PRN
Status: DISCONTINUED | OUTPATIENT
Start: 2025-08-25 | End: 2025-08-25 | Stop reason: SURG

## 2025-08-25 RX ADMIN — EPHEDRINE SULFATE 10 MG: 50 INJECTION, SOLUTION INTRAVENOUS at 17:10

## 2025-08-25 RX ADMIN — ASPIRIN 81 MG: 81 TABLET, CHEWABLE ORAL at 05:15

## 2025-08-25 RX ADMIN — SODIUM CHLORIDE, POTASSIUM CHLORIDE, SODIUM LACTATE AND CALCIUM CHLORIDE 1000 ML: 600; 310; 30; 20 INJECTION, SOLUTION INTRAVENOUS at 21:04

## 2025-08-25 RX ADMIN — PHENYLEPHRINE HYDROCHLORIDE 100 MCG: 10 INJECTION INTRAVENOUS at 17:05

## 2025-08-25 RX ADMIN — VASOPRESSIN 0.03 UNITS/MIN: 20 INJECTION INTRAVENOUS at 19:49

## 2025-08-25 RX ADMIN — NOREPINEPHRINE BITARTRATE 1 MCG/KG/MIN: 1 INJECTION, SOLUTION, CONCENTRATE INTRAVENOUS at 21:30

## 2025-08-25 RX ADMIN — EPHEDRINE SULFATE 10 MG: 50 INJECTION, SOLUTION INTRAVENOUS at 16:43

## 2025-08-25 RX ADMIN — ROCURONIUM BROMIDE 20 MG: 10 INJECTION INTRAVENOUS at 17:24

## 2025-08-25 RX ADMIN — Medication 200 MCG: at 19:02

## 2025-08-25 RX ADMIN — DORZOLAMIDE HYDROCHLORIDE AND TIMOLOL MALEATE 1 DROP: 20; 5 SOLUTION/ DROPS OPHTHALMIC at 05:15

## 2025-08-25 RX ADMIN — ALBUTEROL SULFATE 2.5 MG: 2.5 SOLUTION RESPIRATORY (INHALATION) at 10:06

## 2025-08-25 RX ADMIN — ARIPIPRAZOLE 10 MG: 10 TABLET ORAL at 05:15

## 2025-08-25 RX ADMIN — PHENYLEPHRINE HYDROCHLORIDE 200 MCG: 10 INJECTION INTRAVENOUS at 17:34

## 2025-08-25 RX ADMIN — FENTANYL CITRATE 50 MCG: 50 INJECTION, SOLUTION INTRAMUSCULAR; INTRAVENOUS at 15:56

## 2025-08-25 RX ADMIN — ALBUTEROL SULFATE 2.5 MG: 2.5 SOLUTION RESPIRATORY (INHALATION) at 01:58

## 2025-08-25 RX ADMIN — PHENYLEPHRINE HYDROCHLORIDE 100 MCG: 10 INJECTION INTRAVENOUS at 17:08

## 2025-08-25 RX ADMIN — PHENYLEPHRINE HYDROCHLORIDE 100 MCG: 10 INJECTION INTRAVENOUS at 16:36

## 2025-08-25 RX ADMIN — EPHEDRINE SULFATE 10 MG: 50 INJECTION, SOLUTION INTRAVENOUS at 16:45

## 2025-08-25 RX ADMIN — PHENYLEPHRINE HYDROCHLORIDE 100 MCG: 10 INJECTION INTRAVENOUS at 16:54

## 2025-08-25 RX ADMIN — PHENYLEPHRINE HYDROCHLORIDE 100 MCG: 10 INJECTION INTRAVENOUS at 17:02

## 2025-08-25 RX ADMIN — PHENYLEPHRINE HYDROCHLORIDE 100 MCG: 10 INJECTION INTRAVENOUS at 16:58

## 2025-08-25 RX ADMIN — HYDROCORTISONE SODIUM SUCCINATE 100 MG: 100 INJECTION, POWDER, FOR SOLUTION INTRAMUSCULAR; INTRAVENOUS at 20:41

## 2025-08-25 RX ADMIN — LEVOTHYROXINE SODIUM 25 MCG: 0.03 TABLET ORAL at 05:15

## 2025-08-25 RX ADMIN — Medication 50 MCG/HR: at 21:55

## 2025-08-25 RX ADMIN — ACETYLCYSTEINE 3 ML: 200 SOLUTION ORAL; RESPIRATORY (INHALATION) at 01:58

## 2025-08-25 RX ADMIN — AMPICILLIN SODIUM, SULBACTAM SODIUM 3 G: 2; 1 INJECTION, POWDER, FOR SOLUTION INTRAMUSCULAR; INTRAVENOUS at 05:18

## 2025-08-25 RX ADMIN — PHENYLEPHRINE HYDROCHLORIDE 5 MCG/KG/MIN: 100 INJECTION INTRAVENOUS at 20:55

## 2025-08-25 RX ADMIN — ROCURONIUM BROMIDE 20 MG: 10 INJECTION INTRAVENOUS at 16:54

## 2025-08-25 RX ADMIN — CALCIUM CHLORIDE 250 MG: 100 INJECTION, SOLUTION INTRAVENOUS; INTRAVENTRICULAR at 17:58

## 2025-08-25 RX ADMIN — ACETYLCYSTEINE 3 ML: 200 SOLUTION ORAL; RESPIRATORY (INHALATION) at 10:06

## 2025-08-25 RX ADMIN — ROCURONIUM BROMIDE 100 MG: 10 INJECTION INTRAVENOUS at 15:39

## 2025-08-25 RX ADMIN — ENOXAPARIN SODIUM 100 MG: 100 INJECTION SUBCUTANEOUS at 05:15

## 2025-08-25 RX ADMIN — PROPOFOL 200 MG: 10 INJECTION, EMULSION INTRAVENOUS at 15:39

## 2025-08-25 RX ADMIN — CEFAZOLIN 2 G: 1 INJECTION, POWDER, FOR SOLUTION INTRAMUSCULAR; INTRAVENOUS at 15:45

## 2025-08-25 RX ADMIN — ALBUTEROL SULFATE 2.5 MG: 2.5 SOLUTION RESPIRATORY (INHALATION) at 06:31

## 2025-08-25 RX ADMIN — FENTANYL CITRATE 100 MCG: 50 INJECTION, SOLUTION INTRAMUSCULAR; INTRAVENOUS at 18:48

## 2025-08-25 RX ADMIN — PHENYLEPHRINE HYDROCHLORIDE 100 MCG: 10 INJECTION INTRAVENOUS at 16:25

## 2025-08-25 RX ADMIN — SODIUM CHLORIDE, SODIUM GLUCONATE, SODIUM ACETATE, POTASSIUM CHLORIDE AND MAGNESIUM CHLORIDE: 526; 502; 368; 37; 30 INJECTION, SOLUTION INTRAVENOUS at 16:54

## 2025-08-25 RX ADMIN — LIDOCAINE HYDROCHLORIDE 4 ML: 40 SOLUTION TOPICAL at 15:41

## 2025-08-25 RX ADMIN — HUMAN ALBUMIN MICROSPHERES AND PERFLUTREN 3 ML: 10; .22 INJECTION, SOLUTION INTRAVENOUS at 09:40

## 2025-08-25 RX ADMIN — ALLOPURINOL 300 MG: 300 TABLET ORAL at 05:14

## 2025-08-25 RX ADMIN — CALCIUM CHLORIDE 250 MG: 100 INJECTION, SOLUTION INTRAVENOUS; INTRAVENTRICULAR at 17:55

## 2025-08-25 RX ADMIN — PHENYLEPHRINE HYDROCHLORIDE 100 MCG: 10 INJECTION INTRAVENOUS at 16:47

## 2025-08-25 RX ADMIN — AMPICILLIN SODIUM, SULBACTAM SODIUM 3 G: 2; 1 INJECTION, POWDER, FOR SOLUTION INTRAMUSCULAR; INTRAVENOUS at 12:27

## 2025-08-25 RX ADMIN — FENTANYL CITRATE 50 MCG: 50 INJECTION, SOLUTION INTRAMUSCULAR; INTRAVENOUS at 15:39

## 2025-08-25 RX ADMIN — EPHEDRINE SULFATE 10 MG: 50 INJECTION, SOLUTION INTRAVENOUS at 16:34

## 2025-08-25 RX ADMIN — PHENYLEPHRINE HYDROCHLORIDE 100 MCG: 10 INJECTION INTRAVENOUS at 17:25

## 2025-08-25 RX ADMIN — FAMOTIDINE 20 MG: 20 TABLET, FILM COATED ORAL at 05:14

## 2025-08-25 RX ADMIN — CALCIUM CHLORIDE 250 MG: 100 INJECTION, SOLUTION INTRAVENOUS; INTRAVENTRICULAR at 17:53

## 2025-08-25 RX ADMIN — CALCIUM CHLORIDE 250 MG: 100 INJECTION, SOLUTION INTRAVENOUS; INTRAVENTRICULAR at 17:54

## 2025-08-25 RX ADMIN — ACETYLCYSTEINE 3 ML: 200 SOLUTION ORAL; RESPIRATORY (INHALATION) at 06:31

## 2025-08-25 RX ADMIN — NOREPINEPHRINE BITARTRATE 0.15 MCG/KG/MIN: 1 INJECTION, SOLUTION, CONCENTRATE INTRAVENOUS at 19:11

## 2025-08-25 RX ADMIN — PHENYLEPHRINE HYDROCHLORIDE 200 MCG: 10 INJECTION INTRAVENOUS at 17:29

## 2025-08-25 RX ADMIN — TAMSULOSIN HYDROCHLORIDE 0.4 MG: 0.4 CAPSULE ORAL at 09:16

## 2025-08-25 RX ADMIN — FAMOTIDINE 20 MG: 10 INJECTION, SOLUTION INTRAVENOUS at 21:28

## 2025-08-25 RX ADMIN — PHENYLEPHRINE HYDROCHLORIDE 0.8 MCG/KG/MIN: 100 INJECTION INTRAVENOUS at 18:15

## 2025-08-25 RX ADMIN — PHENYLEPHRINE HYDROCHLORIDE 100 MCG: 10 INJECTION INTRAVENOUS at 17:26

## 2025-08-25 RX ADMIN — PHENYLEPHRINE HYDROCHLORIDE 200 MCG: 10 INJECTION INTRAVENOUS at 17:41

## 2025-08-25 RX ADMIN — SODIUM CHLORIDE, POTASSIUM CHLORIDE, SODIUM LACTATE AND CALCIUM CHLORIDE: 600; 310; 30; 20 INJECTION, SOLUTION INTRAVENOUS at 17:53

## 2025-08-25 RX ADMIN — EPHEDRINE SULFATE 10 MG: 50 INJECTION, SOLUTION INTRAVENOUS at 16:36

## 2025-08-25 RX ADMIN — SODIUM CHLORIDE, POTASSIUM CHLORIDE, SODIUM LACTATE AND CALCIUM CHLORIDE: 600; 310; 30; 20 INJECTION, SOLUTION INTRAVENOUS at 15:36

## 2025-08-25 RX ADMIN — EPINEPHRINE 0.01 MCG/KG/MIN: 1 INJECTION INTRAMUSCULAR; INTRAVENOUS; SUBCUTANEOUS at 20:19

## 2025-08-25 RX ADMIN — PHENYLEPHRINE HYDROCHLORIDE 100 MCG: 10 INJECTION INTRAVENOUS at 17:10

## 2025-08-25 RX ADMIN — Medication 500 MCG: at 19:04

## 2025-08-25 ASSESSMENT — PAIN DESCRIPTION - PAIN TYPE: TYPE: ACUTE PAIN

## 2025-08-25 ASSESSMENT — ENCOUNTER SYMPTOMS: SHORTNESS OF BREATH: 0

## 2025-08-25 ASSESSMENT — FIBROSIS 4 INDEX: FIB4 SCORE: 1.94

## 2025-08-26 PROBLEM — J96.01 ACUTE RESPIRATORY FAILURE WITH HYPOXIA (HCC): Status: ACTIVE | Noted: 2024-11-05

## 2025-08-26 PROBLEM — R65.21 SEPTIC SHOCK (HCC): Status: ACTIVE | Noted: 2025-08-17

## 2025-08-26 PROBLEM — J98.11 ATELECTASIS: Status: ACTIVE | Noted: 2025-08-26

## 2025-08-26 LAB
ACTION RANGE TRIGGERED IACRT: NO
ALBUMIN SERPL BCP-MCNC: 2.6 G/DL (ref 3.2–4.9)
ALBUMIN/GLOB SERPL: 1.2 G/DL
ALP SERPL-CCNC: 77 U/L (ref 30–99)
ALT SERPL-CCNC: 23 U/L (ref 2–50)
ANION GAP SERPL CALC-SCNC: 10 MMOL/L (ref 7–16)
ANION GAP SERPL CALC-SCNC: 11 MMOL/L (ref 7–16)
ANION GAP SERPL CALC-SCNC: 7 MMOL/L (ref 7–16)
ANION GAP SERPL CALC-SCNC: 9 MMOL/L (ref 7–16)
AST SERPL-CCNC: 48 U/L (ref 12–45)
BARCODED ABORH UBTYP: 2800
BARCODED ABORH UBTYP: 5100
BARCODED ABORH UBTYP: 6200
BARCODED ABORH UBTYP: 6200
BARCODED ABORH UBTYP: 7300
BARCODED PRD CODE UBPRD: NORMAL
BARCODED UNIT NUM UBUNT: NORMAL
BASE EXCESS BLDA CALC-SCNC: -8 MMOL/L (ref -4–3)
BILIRUB SERPL-MCNC: 5.4 MG/DL (ref 0.1–1.5)
BREATHS SETTING VENT: 26
BUN SERPL-MCNC: 18 MG/DL (ref 8–22)
BUN SERPL-MCNC: 21 MG/DL (ref 8–22)
CA-I SERPL-SCNC: 1.1 MMOL/L (ref 1.1–1.3)
CA-I SERPL-SCNC: 1.1 MMOL/L (ref 1.1–1.3)
CALCIUM ALBUM COR SERPL-MCNC: 8.4 MG/DL (ref 8.5–10.5)
CALCIUM SERPL-MCNC: 7.3 MG/DL (ref 8.5–10.5)
CALCIUM SERPL-MCNC: 7.5 MG/DL (ref 8.5–10.5)
CHLORIDE SERPL-SCNC: 110 MMOL/L (ref 96–112)
CHLORIDE SERPL-SCNC: 112 MMOL/L (ref 96–112)
CHLORIDE SERPL-SCNC: 112 MMOL/L (ref 96–112)
CHLORIDE SERPL-SCNC: 113 MMOL/L (ref 96–112)
CO2 BLDA-SCNC: 17 MMOL/L (ref 20–33)
CO2 SERPL-SCNC: 15 MMOL/L (ref 20–33)
CO2 SERPL-SCNC: 17 MMOL/L (ref 20–33)
CO2 SERPL-SCNC: 20 MMOL/L (ref 20–33)
CO2 SERPL-SCNC: 20 MMOL/L (ref 20–33)
COMPONENT F 8504F: NORMAL
COMPONENT P 8504P: NORMAL
COMPONENT R 8504R: NORMAL
CREAT SERPL-MCNC: 1.24 MG/DL (ref 0.5–1.4)
CREAT SERPL-MCNC: 1.28 MG/DL (ref 0.5–1.4)
CREAT SERPL-MCNC: 1.33 MG/DL (ref 0.5–1.4)
CREAT SERPL-MCNC: 1.47 MG/DL (ref 0.5–1.4)
CYTOLOGY REG CYTOL: NORMAL
DELSYS IDSYS: ABNORMAL
FUNGUS SPEC FUNGUS STN: NORMAL
GFR SERPLBLD CREATININE-BSD FMLA CKD-EPI: 50 ML/MIN/1.73 M 2
GFR SERPLBLD CREATININE-BSD FMLA CKD-EPI: 56 ML/MIN/1.73 M 2
GFR SERPLBLD CREATININE-BSD FMLA CKD-EPI: 59 ML/MIN/1.73 M 2
GFR SERPLBLD CREATININE-BSD FMLA CKD-EPI: 61 ML/MIN/1.73 M 2
GLOBULIN SER CALC-MCNC: 2.2 G/DL (ref 1.9–3.5)
GLUCOSE SERPL-MCNC: 154 MG/DL (ref 65–99)
GLUCOSE SERPL-MCNC: 156 MG/DL (ref 65–99)
GLUCOSE SERPL-MCNC: 177 MG/DL (ref 65–99)
GLUCOSE SERPL-MCNC: 185 MG/DL (ref 65–99)
GRAM STN SPEC: NORMAL
HCO3 BLDA-SCNC: 16 MMOL/L (ref 21–28)
HCT VFR BLD AUTO: 21.9 % (ref 42–52)
HCT VFR BLD AUTO: 24.6 % (ref 42–52)
HCT VFR BLD AUTO: 29.9 % (ref 42–52)
HCT VFR BLD AUTO: 31.8 % (ref 42–52)
HGB BLD-MCNC: 10.6 G/DL (ref 14–18)
HGB BLD-MCNC: 7.6 G/DL (ref 14–18)
HGB BLD-MCNC: 8.5 G/DL (ref 14–18)
HGB BLD-MCNC: 9.9 G/DL (ref 14–18)
INST. QUALIFIED PATIENT IIQPT: YES
LACTATE BLD-SCNC: 2.5 MMOL/L (ref 0.5–2)
LACTATE SERPL-SCNC: 2.2 MMOL/L (ref 0.5–2)
LACTATE SERPL-SCNC: 2.5 MMOL/L (ref 0.5–2)
LACTATE SERPL-SCNC: 3.2 MMOL/L (ref 0.5–2)
LACTATE SERPL-SCNC: 4.2 MMOL/L (ref 0.5–2)
LACTATE SERPL-SCNC: 6.3 MMOL/L (ref 0.5–2)
Lab: ABNORMAL
MAGNESIUM SERPL-MCNC: 1.9 MG/DL (ref 1.5–2.5)
MODE IMODE: ABNORMAL
O2/TOTAL GAS SETTING VFR VENT: 8 %
O2/TOTAL GAS SETTING VFR VENT: 8 %
PATHOLOGY CONSULT NOTE: NORMAL
PCO2 BLDA: 28 MMHG (ref 32–48)
PCO2 TEMP ADJ BLDA: 30 MMHG (ref 32–48)
PEEP END EXPIRATORY PRESSURE IPEEP: 80
PH BLDA: 7.37 [PH] (ref 7.35–7.45)
PH TEMP ADJ BLDA: 7.35 [PH] (ref 7.35–7.45)
PHOSPHATE SERPL-MCNC: 3.4 MG/DL (ref 2.5–4.5)
PO2 BLDA: 168 MMHG (ref 83–108)
PO2 TEMP ADJ BLDA: 176 MMHG (ref 83–108)
POTASSIUM SERPL-SCNC: 4.1 MMOL/L (ref 3.6–5.5)
POTASSIUM SERPL-SCNC: 4.3 MMOL/L (ref 3.6–5.5)
POTASSIUM SERPL-SCNC: 5.8 MMOL/L (ref 3.6–5.5)
POTASSIUM SERPL-SCNC: 5.9 MMOL/L (ref 3.6–5.5)
PRODUCT TYPE UPROD: NORMAL
PROT SERPL-MCNC: 4.8 G/DL (ref 6–8.2)
SAO2 % BLDA: 99 % (ref 93–99)
SIGNIFICANT IND 70042: NORMAL
SIGNIFICANT IND 70042: NORMAL
SITE SITE: NORMAL
SITE SITE: NORMAL
SODIUM SERPL-SCNC: 137 MMOL/L (ref 135–145)
SODIUM SERPL-SCNC: 137 MMOL/L (ref 135–145)
SODIUM SERPL-SCNC: 141 MMOL/L (ref 135–145)
SODIUM SERPL-SCNC: 141 MMOL/L (ref 135–145)
SOURCE SOURCE: NORMAL
SOURCE SOURCE: NORMAL
SPECIMEN DRAWN FROM PATIENT: ABNORMAL
TIDAL VOLUME IVT: 400
UNIT STATUS USTAT: NORMAL

## 2025-08-26 PROCEDURE — 84100 ASSAY OF PHOSPHORUS: CPT

## 2025-08-26 PROCEDURE — 87102 FUNGUS ISOLATION CULTURE: CPT

## 2025-08-26 PROCEDURE — 85014 HEMATOCRIT: CPT | Mod: 91

## 2025-08-26 PROCEDURE — 37799 UNLISTED PX VASCULAR SURGERY: CPT

## 2025-08-26 PROCEDURE — 700111 HCHG RX REV CODE 636 W/ 250 OVERRIDE (IP): Mod: JZ | Performed by: INTERNAL MEDICINE

## 2025-08-26 PROCEDURE — 700102 HCHG RX REV CODE 250 W/ 637 OVERRIDE(OP): Performed by: INTERNAL MEDICINE

## 2025-08-26 PROCEDURE — 770022 HCHG ROOM/CARE - ICU (200)

## 2025-08-26 PROCEDURE — 36556 INSERT NON-TUNNEL CV CATH: CPT

## 2025-08-26 PROCEDURE — 302978 HCHG BRONCHOSCOPY-DIAGNOSTIC

## 2025-08-26 PROCEDURE — 82330 ASSAY OF CALCIUM: CPT | Mod: 91

## 2025-08-26 PROCEDURE — C1751 CATH, INF, PER/CENT/MIDLINE: HCPCS

## 2025-08-26 PROCEDURE — A9270 NON-COVERED ITEM OR SERVICE: HCPCS | Performed by: INTERNAL MEDICINE

## 2025-08-26 PROCEDURE — 87070 CULTURE OTHR SPECIMN AEROBIC: CPT

## 2025-08-26 PROCEDURE — 94003 VENT MGMT INPAT SUBQ DAY: CPT

## 2025-08-26 PROCEDURE — 700105 HCHG RX REV CODE 258: Performed by: INTERNAL MEDICINE

## 2025-08-26 PROCEDURE — 700101 HCHG RX REV CODE 250: Performed by: INTERNAL MEDICINE

## 2025-08-26 PROCEDURE — 36620 INSERTION CATHETER ARTERY: CPT

## 2025-08-26 PROCEDURE — 83605 ASSAY OF LACTIC ACID: CPT | Performed by: INTERNAL MEDICINE

## 2025-08-26 PROCEDURE — 700101 HCHG RX REV CODE 250: Performed by: STUDENT IN AN ORGANIZED HEALTH CARE EDUCATION/TRAINING PROGRAM

## 2025-08-26 PROCEDURE — 94640 AIRWAY INHALATION TREATMENT: CPT

## 2025-08-26 PROCEDURE — 83735 ASSAY OF MAGNESIUM: CPT

## 2025-08-26 PROCEDURE — 85018 HEMOGLOBIN: CPT | Mod: 91

## 2025-08-26 PROCEDURE — 87205 SMEAR GRAM STAIN: CPT | Mod: 91

## 2025-08-26 PROCEDURE — 87116 MYCOBACTERIA CULTURE: CPT

## 2025-08-26 PROCEDURE — 82803 BLOOD GASES ANY COMBINATION: CPT | Performed by: INTERNAL MEDICINE

## 2025-08-26 PROCEDURE — 83605 ASSAY OF LACTIC ACID: CPT

## 2025-08-26 PROCEDURE — 80053 COMPREHEN METABOLIC PANEL: CPT

## 2025-08-26 PROCEDURE — 700101 HCHG RX REV CODE 250

## 2025-08-26 PROCEDURE — 80048 BASIC METABOLIC PNL TOTAL CA: CPT | Mod: 91

## 2025-08-26 RX ORDER — HYDROMORPHONE HYDROCHLORIDE 1 MG/ML
.5-2 INJECTION, SOLUTION INTRAMUSCULAR; INTRAVENOUS; SUBCUTANEOUS
Status: DISCONTINUED | OUTPATIENT
Start: 2025-08-26 | End: 2025-08-30

## 2025-08-26 RX ORDER — OXYCODONE HYDROCHLORIDE 5 MG/1
5 TABLET ORAL EVERY 4 HOURS PRN
Refills: 0 | Status: DISCONTINUED | OUTPATIENT
Start: 2025-08-26 | End: 2025-08-29

## 2025-08-26 RX ORDER — GABAPENTIN 300 MG/1
300 CAPSULE ORAL 3 TIMES DAILY
Status: DISPENSED | OUTPATIENT
Start: 2025-08-26

## 2025-08-26 RX ORDER — MIDAZOLAM HYDROCHLORIDE 1 MG/ML
5 INJECTION INTRAMUSCULAR; INTRAVENOUS ONCE
Status: COMPLETED | OUTPATIENT
Start: 2025-08-26 | End: 2025-08-26

## 2025-08-26 RX ORDER — DEXMEDETOMIDINE HYDROCHLORIDE 4 UG/ML
.1-1.5 INJECTION, SOLUTION INTRAVENOUS CONTINUOUS
Status: DISCONTINUED | OUTPATIENT
Start: 2025-08-26 | End: 2025-08-30

## 2025-08-26 RX ORDER — ACETAMINOPHEN 500 MG
1000 TABLET ORAL EVERY 8 HOURS
Status: DISPENSED | OUTPATIENT
Start: 2025-08-26

## 2025-08-26 RX ORDER — SODIUM CHLORIDE, SODIUM LACTATE, POTASSIUM CHLORIDE, AND CALCIUM CHLORIDE .6; .31; .03; .02 G/100ML; G/100ML; G/100ML; G/100ML
1000 INJECTION, SOLUTION INTRAVENOUS ONCE
Status: COMPLETED | OUTPATIENT
Start: 2025-08-26 | End: 2025-08-26

## 2025-08-26 RX ADMIN — FENTANYL CITRATE 100 MCG: 50 INJECTION, SOLUTION INTRAMUSCULAR; INTRAVENOUS at 05:19

## 2025-08-26 RX ADMIN — HYDROMORPHONE HYDROCHLORIDE 1 MG: 1 INJECTION, SOLUTION INTRAMUSCULAR; INTRAVENOUS; SUBCUTANEOUS at 22:54

## 2025-08-26 RX ADMIN — ALBUTEROL SULFATE 2.5 MG: 2.5 SOLUTION RESPIRATORY (INHALATION) at 22:50

## 2025-08-26 RX ADMIN — GABAPENTIN 300 MG: 300 CAPSULE ORAL at 17:41

## 2025-08-26 RX ADMIN — AMPICILLIN SODIUM, SULBACTAM SODIUM 3 G: 2; 1 INJECTION, POWDER, FOR SOLUTION INTRAMUSCULAR; INTRAVENOUS at 17:41

## 2025-08-26 RX ADMIN — NOREPINEPHRINE BITARTRATE 0.15 MCG/KG/MIN: 1 INJECTION, SOLUTION, CONCENTRATE INTRAVENOUS at 05:56

## 2025-08-26 RX ADMIN — FENTANYL CITRATE 100 MCG: 50 INJECTION, SOLUTION INTRAMUSCULAR; INTRAVENOUS at 02:53

## 2025-08-26 RX ADMIN — MIDAZOLAM HYDROCHLORIDE 5 MG: 1 INJECTION, SOLUTION INTRAMUSCULAR; INTRAVENOUS at 10:02

## 2025-08-26 RX ADMIN — SENNOSIDES, DOCUSATE SODIUM 2 TABLET: 50; 8.6 TABLET, FILM COATED ORAL at 05:52

## 2025-08-26 RX ADMIN — ACETYLCYSTEINE 3 ML: 200 SOLUTION ORAL; RESPIRATORY (INHALATION) at 02:21

## 2025-08-26 RX ADMIN — POLYETHYLENE GLYCOL 3350 1 PACKET: 17 POWDER, FOR SOLUTION ORAL at 05:52

## 2025-08-26 RX ADMIN — ALBUTEROL SULFATE 2.5 MG: 2.5 SOLUTION RESPIRATORY (INHALATION) at 02:21

## 2025-08-26 RX ADMIN — ALBUTEROL SULFATE 2.5 MG: 2.5 SOLUTION RESPIRATORY (INHALATION) at 14:34

## 2025-08-26 RX ADMIN — NOREPINEPHRINE BITARTRATE 0.5 MCG/KG/MIN: 1 INJECTION, SOLUTION, CONCENTRATE INTRAVENOUS at 00:47

## 2025-08-26 RX ADMIN — ACETYLCYSTEINE 3 ML: 200 SOLUTION ORAL; RESPIRATORY (INHALATION) at 06:51

## 2025-08-26 RX ADMIN — ALBUTEROL SULFATE 2.5 MG: 2.5 SOLUTION RESPIRATORY (INHALATION) at 06:51

## 2025-08-26 RX ADMIN — ACETYLCYSTEINE 3 ML: 200 SOLUTION ORAL; RESPIRATORY (INHALATION) at 14:34

## 2025-08-26 RX ADMIN — HYDROMORPHONE HYDROCHLORIDE 2 MG: 1 INJECTION, SOLUTION INTRAMUSCULAR; INTRAVENOUS; SUBCUTANEOUS at 08:42

## 2025-08-26 RX ADMIN — GABAPENTIN 300 MG: 300 CAPSULE ORAL at 12:05

## 2025-08-26 RX ADMIN — AMIODARONE HYDROCHLORIDE 200 MG: 200 TABLET ORAL at 17:41

## 2025-08-26 RX ADMIN — ASPIRIN 81 MG: 81 TABLET, CHEWABLE ORAL at 05:51

## 2025-08-26 RX ADMIN — DORZOLAMIDE HYDROCHLORIDE AND TIMOLOL MALEATE 1 DROP: 20; 5 SOLUTION/ DROPS OPHTHALMIC at 05:24

## 2025-08-26 RX ADMIN — FENTANYL CITRATE 100 MCG: 50 INJECTION, SOLUTION INTRAMUSCULAR; INTRAVENOUS at 00:58

## 2025-08-26 RX ADMIN — ACETYLCYSTEINE 3 ML: 200 SOLUTION ORAL; RESPIRATORY (INHALATION) at 22:50

## 2025-08-26 RX ADMIN — AMPICILLIN SODIUM, SULBACTAM SODIUM 3 G: 2; 1 INJECTION, POWDER, FOR SOLUTION INTRAMUSCULAR; INTRAVENOUS at 01:16

## 2025-08-26 RX ADMIN — OXYCODONE HYDROCHLORIDE 5 MG: 5 TABLET ORAL at 14:39

## 2025-08-26 RX ADMIN — ACETAMINOPHEN 1000 MG: 500 TABLET ORAL at 14:38

## 2025-08-26 RX ADMIN — HYDROCORTISONE SODIUM SUCCINATE 100 MG: 100 INJECTION, POWDER, FOR SOLUTION INTRAMUSCULAR; INTRAVENOUS at 14:39

## 2025-08-26 RX ADMIN — ACETAMINOPHEN 1000 MG: 500 TABLET ORAL at 22:55

## 2025-08-26 RX ADMIN — DORZOLAMIDE HYDROCHLORIDE AND TIMOLOL MALEATE 1 DROP: 20; 5 SOLUTION/ DROPS OPHTHALMIC at 17:42

## 2025-08-26 RX ADMIN — FAMOTIDINE 20 MG: 20 TABLET, FILM COATED ORAL at 17:41

## 2025-08-26 RX ADMIN — FAMOTIDINE 20 MG: 10 INJECTION, SOLUTION INTRAVENOUS at 05:23

## 2025-08-26 RX ADMIN — AMPICILLIN SODIUM, SULBACTAM SODIUM 3 G: 2; 1 INJECTION, POWDER, FOR SOLUTION INTRAMUSCULAR; INTRAVENOUS at 11:47

## 2025-08-26 RX ADMIN — ACETYLCYSTEINE 3 ML: 200 SOLUTION ORAL; RESPIRATORY (INHALATION) at 10:22

## 2025-08-26 RX ADMIN — ACETYLCYSTEINE 3 ML: 200 SOLUTION ORAL; RESPIRATORY (INHALATION) at 00:25

## 2025-08-26 RX ADMIN — DEXMEDETOMIDINE 0.2 MCG/KG/HR: 100 INJECTION, SOLUTION INTRAVENOUS at 14:38

## 2025-08-26 RX ADMIN — OXYCODONE HYDROCHLORIDE 5 MG: 5 TABLET ORAL at 19:28

## 2025-08-26 RX ADMIN — LATANOPROST 1 DROP: 50 SOLUTION OPHTHALMIC at 00:50

## 2025-08-26 RX ADMIN — HYDROMORPHONE HYDROCHLORIDE 1 MG: 1 INJECTION, SOLUTION INTRAMUSCULAR; INTRAVENOUS; SUBCUTANEOUS at 10:06

## 2025-08-26 RX ADMIN — LATANOPROST 1 DROP: 50 SOLUTION OPHTHALMIC at 22:47

## 2025-08-26 RX ADMIN — HYDROCORTISONE SODIUM SUCCINATE 100 MG: 100 INJECTION, POWDER, FOR SOLUTION INTRAMUSCULAR; INTRAVENOUS at 05:19

## 2025-08-26 RX ADMIN — HYDROCORTISONE SODIUM SUCCINATE 100 MG: 100 INJECTION, POWDER, FOR SOLUTION INTRAMUSCULAR; INTRAVENOUS at 22:55

## 2025-08-26 RX ADMIN — AMPICILLIN SODIUM, SULBACTAM SODIUM 3 G: 2; 1 INJECTION, POWDER, FOR SOLUTION INTRAMUSCULAR; INTRAVENOUS at 05:30

## 2025-08-26 RX ADMIN — ALBUTEROL SULFATE 2.5 MG: 2.5 SOLUTION RESPIRATORY (INHALATION) at 10:22

## 2025-08-26 RX ADMIN — ALLOPURINOL 300 MG: 300 TABLET ORAL at 05:52

## 2025-08-26 RX ADMIN — ARIPIPRAZOLE 10 MG: 10 TABLET ORAL at 05:53

## 2025-08-26 RX ADMIN — SODIUM CHLORIDE, POTASSIUM CHLORIDE, SODIUM LACTATE AND CALCIUM CHLORIDE 1000 ML: 600; 310; 30; 20 INJECTION, SOLUTION INTRAVENOUS at 11:50

## 2025-08-26 RX ADMIN — SENNOSIDES, DOCUSATE SODIUM 2 TABLET: 50; 8.6 TABLET, FILM COATED ORAL at 17:41

## 2025-08-26 RX ADMIN — VASOPRESSIN 0.03 UNITS/MIN: 20 INJECTION INTRAVENOUS at 05:57

## 2025-08-26 RX ADMIN — MAGNESIUM HYDROXIDE 30 ML: 400 SUSPENSION ORAL at 17:41

## 2025-08-26 RX ADMIN — ALBUTEROL SULFATE 2.5 MG: 2.5 SOLUTION RESPIRATORY (INHALATION) at 00:25

## 2025-08-26 RX ADMIN — LEVOTHYROXINE SODIUM 25 MCG: 0.03 TABLET ORAL at 05:56

## 2025-08-26 ASSESSMENT — PAIN DESCRIPTION - PAIN TYPE
TYPE: ACUTE PAIN

## 2025-08-26 ASSESSMENT — FIBROSIS 4 INDEX: FIB4 SCORE: 2.01

## 2025-08-26 ASSESSMENT — PAIN SCALES - GENERAL: PAIN_LEVEL: 0

## 2025-08-27 PROBLEM — D62 ACUTE BLOOD LOSS ANEMIA: Status: ACTIVE | Noted: 2025-08-27

## 2025-08-27 LAB
ACTION RANGE TRIGGERED IACRT: NO
ALBUMIN SERPL BCP-MCNC: 2.3 G/DL (ref 3.2–4.9)
ALBUMIN/GLOB SERPL: 0.9 G/DL
ALP SERPL-CCNC: 80 U/L (ref 30–99)
ALT SERPL-CCNC: 17 U/L (ref 2–50)
ANION GAP SERPL CALC-SCNC: 10 MMOL/L (ref 7–16)
AST SERPL-CCNC: 32 U/L (ref 12–45)
BASE EXCESS BLDA CALC-SCNC: -4 MMOL/L (ref -4–3)
BASOPHILS # BLD AUTO: 0.2 % (ref 0–1.8)
BASOPHILS # BLD: 0.02 K/UL (ref 0–0.12)
BILIRUB SERPL-MCNC: 0.7 MG/DL (ref 0.1–1.5)
BREATHS SETTING VENT: 26
BUN SERPL-MCNC: 23 MG/DL (ref 8–22)
CA-I SERPL-SCNC: 1.1 MMOL/L (ref 1.1–1.3)
CALCIUM ALBUM COR SERPL-MCNC: 8.8 MG/DL (ref 8.5–10.5)
CALCIUM SERPL-MCNC: 7.4 MG/DL (ref 8.5–10.5)
CFT BLD TEG: 5.7 MIN (ref 4.6–9.1)
CFT P HPASE BLD TEG: 5.8 MIN (ref 4.3–8.3)
CHLORIDE SERPL-SCNC: 111 MMOL/L (ref 96–112)
CLOT ANGLE BLD TEG: 75.1 DEGREES (ref 63–78)
CLOT LYSIS 30M P MA LENFR BLD TEG: 0 % (ref 0–2.6)
CO2 BLDA-SCNC: 21 MMOL/L (ref 20–33)
CO2 SERPL-SCNC: 20 MMOL/L (ref 20–33)
COMMENT 1642: NORMAL
CREAT SERPL-MCNC: 1.12 MG/DL (ref 0.5–1.4)
CRP SERPL HS-MCNC: 14.3 MG/DL (ref 0–0.75)
CT.EXTRINSIC BLD ROTEM: 1 MIN (ref 0.8–2.1)
DELSYS IDSYS: ABNORMAL
EOSINOPHIL # BLD AUTO: 0 K/UL (ref 0–0.51)
EOSINOPHIL NFR BLD: 0 % (ref 0–6.9)
ERYTHROCYTE [DISTWIDTH] IN BLOOD BY AUTOMATED COUNT: 47.8 FL (ref 35.9–50)
FIBRINOGEN PPP-MCNC: 450 MG/DL (ref 215–460)
FUNGUS SPEC CULT: NORMAL
FUNGUS SPEC FUNGUS STN: NORMAL
GFR SERPLBLD CREATININE-BSD FMLA CKD-EPI: 69 ML/MIN/1.73 M 2
GLOBULIN SER CALC-MCNC: 2.5 G/DL (ref 1.9–3.5)
GLUCOSE SERPL-MCNC: 207 MG/DL (ref 65–99)
GRAM STN SPEC: NORMAL
HCO3 BLDA-SCNC: 20 MMOL/L (ref 21–28)
HCT VFR BLD AUTO: 19 % (ref 42–52)
HCT VFR BLD AUTO: 19.7 % (ref 42–52)
HCT VFR BLD AUTO: 20.8 % (ref 42–52)
HCT VFR BLD AUTO: 22 % (ref 42–52)
HGB BLD-MCNC: 6.4 G/DL (ref 14–18)
HGB BLD-MCNC: 6.7 G/DL (ref 14–18)
HGB BLD-MCNC: 7.1 G/DL (ref 14–18)
HGB BLD-MCNC: 7.5 G/DL (ref 14–18)
IMM GRANULOCYTES # BLD AUTO: 0.14 K/UL (ref 0–0.11)
IMM GRANULOCYTES NFR BLD AUTO: 1.3 % (ref 0–0.9)
INST. QUALIFIED PATIENT IIQPT: YES
LACTATE BLD-SCNC: 1.86 MMOL/L (ref 0.5–2)
LYMPHOCYTES # BLD AUTO: 0.4 K/UL (ref 1–4.8)
LYMPHOCYTES NFR BLD: 3.7 % (ref 22–41)
Lab: ABNORMAL
MAGNESIUM SERPL-MCNC: 2.2 MG/DL (ref 1.5–2.5)
MCF BLD TEG: 65.5 MM (ref 52–69)
MCF.PLATELET INHIB BLD ROTEM: 37.2 MM (ref 15–32)
MCH RBC QN AUTO: 28.5 PG (ref 27–33)
MCHC RBC AUTO-ENTMCNC: 34 G/DL (ref 32.3–36.5)
MCV RBC AUTO: 83.8 FL (ref 81.4–97.8)
MODE IMODE: ABNORMAL
MONOCYTES # BLD AUTO: 0.59 K/UL (ref 0–0.85)
MONOCYTES NFR BLD AUTO: 5.4 % (ref 0–13.4)
MORPHOLOGY BLD-IMP: NORMAL
NEUTROPHILS # BLD AUTO: 9.69 K/UL (ref 1.82–7.42)
NEUTROPHILS NFR BLD: 89.4 % (ref 44–72)
NRBC # BLD AUTO: 0.02 K/UL
NRBC BLD-RTO: 0.2 /100 WBC (ref 0–0.2)
O2/TOTAL GAS SETTING VFR VENT: 60 %
O2/TOTAL GAS SETTING VFR VENT: 60 %
PA AA BLD-ACNC: 85.5 % (ref 0–11)
PA ADP BLD-ACNC: 33.7 % (ref 0–17)
PCO2 BLDA: 29 MMHG (ref 32–48)
PCO2 TEMP ADJ BLDA: 29 MMHG (ref 32–48)
PEEP END EXPIRATORY PRESSURE IPEEP: 10
PH BLDA: 7.44 [PH] (ref 7.35–7.45)
PH TEMP ADJ BLDA: 7.44 [PH] (ref 7.35–7.45)
PHOSPHATE SERPL-MCNC: 1.9 MG/DL (ref 2.5–4.5)
PLATELET # BLD AUTO: 131 K/UL (ref 164–446)
PLATELET BLD QL SMEAR: NORMAL
PMV BLD AUTO: 9.2 FL (ref 9–12.9)
PO2 BLDA: 151 MMHG (ref 83–108)
PO2 TEMP ADJ BLDA: 152 MMHG (ref 83–108)
POTASSIUM SERPL-SCNC: 3.9 MMOL/L (ref 3.6–5.5)
PREALB SERPL-MCNC: 6.9 MG/DL (ref 18–38)
PROT SERPL-MCNC: 4.8 G/DL (ref 6–8.2)
RBC # BLD AUTO: 2.35 M/UL (ref 4.7–6.1)
RBC BLD AUTO: PRESENT
SAO2 % BLDA: 99 % (ref 93–99)
SIGNIFICANT IND 70042: NORMAL
SIGNIFICANT IND 70042: NORMAL
SITE SITE: NORMAL
SITE SITE: NORMAL
SODIUM SERPL-SCNC: 141 MMOL/L (ref 135–145)
SOURCE SOURCE: NORMAL
SOURCE SOURCE: NORMAL
SPECIMEN DRAWN FROM PATIENT: ABNORMAL
TEG ALGORITHM TGALG: ABNORMAL
TIDAL VOLUME IVT: 400
TOXIC GRANULES BLD QL SMEAR: NORMAL
WBC # BLD AUTO: 10.8 K/UL (ref 4.8–10.8)

## 2025-08-27 PROCEDURE — 85576 BLOOD PLATELET AGGREGATION: CPT | Mod: 91

## 2025-08-27 PROCEDURE — 99152 MOD SED SAME PHYS/QHP 5/>YRS: CPT

## 2025-08-27 PROCEDURE — 700105 HCHG RX REV CODE 258: Performed by: INTERNAL MEDICINE

## 2025-08-27 PROCEDURE — 85384 FIBRINOGEN ACTIVITY: CPT

## 2025-08-27 PROCEDURE — 85347 COAGULATION TIME ACTIVATED: CPT

## 2025-08-27 PROCEDURE — 83735 ASSAY OF MAGNESIUM: CPT

## 2025-08-27 PROCEDURE — 85014 HEMATOCRIT: CPT | Mod: 91

## 2025-08-27 PROCEDURE — 85018 HEMOGLOBIN: CPT

## 2025-08-27 PROCEDURE — 82330 ASSAY OF CALCIUM: CPT

## 2025-08-27 PROCEDURE — 84100 ASSAY OF PHOSPHORUS: CPT

## 2025-08-27 PROCEDURE — 82803 BLOOD GASES ANY COMBINATION: CPT | Performed by: INTERNAL MEDICINE

## 2025-08-27 PROCEDURE — A9270 NON-COVERED ITEM OR SERVICE: HCPCS | Performed by: INTERNAL MEDICINE

## 2025-08-27 PROCEDURE — 770022 HCHG ROOM/CARE - ICU (200)

## 2025-08-27 PROCEDURE — 84134 ASSAY OF PREALBUMIN: CPT

## 2025-08-27 PROCEDURE — 85025 COMPLETE CBC W/AUTO DIFF WBC: CPT

## 2025-08-27 PROCEDURE — 700102 HCHG RX REV CODE 250 W/ 637 OVERRIDE(OP): Performed by: INTERNAL MEDICINE

## 2025-08-27 PROCEDURE — 700101 HCHG RX REV CODE 250: Performed by: INTERNAL MEDICINE

## 2025-08-27 PROCEDURE — 80053 COMPREHEN METABOLIC PANEL: CPT

## 2025-08-27 PROCEDURE — 700101 HCHG RX REV CODE 250

## 2025-08-27 PROCEDURE — 37799 UNLISTED PX VASCULAR SURGERY: CPT

## 2025-08-27 PROCEDURE — 700111 HCHG RX REV CODE 636 W/ 250 OVERRIDE (IP): Mod: JZ | Performed by: INTERNAL MEDICINE

## 2025-08-27 PROCEDURE — 302978 HCHG BRONCHOSCOPY-DIAGNOSTIC

## 2025-08-27 PROCEDURE — P9016 RBC LEUKOCYTES REDUCED: HCPCS

## 2025-08-27 PROCEDURE — 700111 HCHG RX REV CODE 636 W/ 250 OVERRIDE (IP): Performed by: INTERNAL MEDICINE

## 2025-08-27 PROCEDURE — 87070 CULTURE OTHR SPECIMN AEROBIC: CPT

## 2025-08-27 PROCEDURE — 700101 HCHG RX REV CODE 250: Performed by: STUDENT IN AN ORGANIZED HEALTH CARE EDUCATION/TRAINING PROGRAM

## 2025-08-27 PROCEDURE — 86923 COMPATIBILITY TEST ELECTRIC: CPT

## 2025-08-27 PROCEDURE — 83605 ASSAY OF LACTIC ACID: CPT | Performed by: INTERNAL MEDICINE

## 2025-08-27 PROCEDURE — 86140 C-REACTIVE PROTEIN: CPT

## 2025-08-27 PROCEDURE — 87205 SMEAR GRAM STAIN: CPT

## 2025-08-27 PROCEDURE — 36430 TRANSFUSION BLD/BLD COMPNT: CPT

## 2025-08-27 PROCEDURE — 94640 AIRWAY INHALATION TREATMENT: CPT

## 2025-08-27 PROCEDURE — 94003 VENT MGMT INPAT SUBQ DAY: CPT

## 2025-08-27 RX ORDER — FUROSEMIDE 10 MG/ML
40 INJECTION INTRAMUSCULAR; INTRAVENOUS ONCE
Status: COMPLETED | OUTPATIENT
Start: 2025-08-27 | End: 2025-08-27

## 2025-08-27 RX ORDER — MIDAZOLAM HYDROCHLORIDE 1 MG/ML
5 INJECTION INTRAMUSCULAR; INTRAVENOUS ONCE
Status: COMPLETED | OUTPATIENT
Start: 2025-08-27 | End: 2025-08-27

## 2025-08-27 RX ADMIN — SENNOSIDES, DOCUSATE SODIUM 2 TABLET: 50; 8.6 TABLET, FILM COATED ORAL at 17:15

## 2025-08-27 RX ADMIN — SODIUM PHOSPHATE, MONOBASIC, MONOHYDRATE AND SODIUM PHOSPHATE, DIBASIC, ANHYDROUS 30 MMOL: 276; 142 INJECTION, SOLUTION INTRAVENOUS at 08:53

## 2025-08-27 RX ADMIN — ACETYLCYSTEINE 3 ML: 200 SOLUTION ORAL; RESPIRATORY (INHALATION) at 09:57

## 2025-08-27 RX ADMIN — OXYCODONE HYDROCHLORIDE 5 MG: 5 TABLET ORAL at 03:06

## 2025-08-27 RX ADMIN — HYDROCORTISONE SODIUM SUCCINATE 100 MG: 100 INJECTION, POWDER, FOR SOLUTION INTRAMUSCULAR; INTRAVENOUS at 13:24

## 2025-08-27 RX ADMIN — ALBUTEROL SULFATE 2.5 MG: 2.5 SOLUTION RESPIRATORY (INHALATION) at 14:23

## 2025-08-27 RX ADMIN — OXYCODONE HYDROCHLORIDE 5 MG: 5 TABLET ORAL at 07:25

## 2025-08-27 RX ADMIN — LATANOPROST 1 DROP: 50 SOLUTION OPHTHALMIC at 20:50

## 2025-08-27 RX ADMIN — POLYETHYLENE GLYCOL 3350 1 PACKET: 17 POWDER, FOR SOLUTION ORAL at 05:29

## 2025-08-27 RX ADMIN — AMPICILLIN SODIUM, SULBACTAM SODIUM 3 G: 2; 1 INJECTION, POWDER, FOR SOLUTION INTRAMUSCULAR; INTRAVENOUS at 17:16

## 2025-08-27 RX ADMIN — ALBUTEROL SULFATE 2.5 MG: 2.5 SOLUTION RESPIRATORY (INHALATION) at 06:34

## 2025-08-27 RX ADMIN — ALBUTEROL SULFATE 2.5 MG: 2.5 SOLUTION RESPIRATORY (INHALATION) at 02:34

## 2025-08-27 RX ADMIN — DORZOLAMIDE HYDROCHLORIDE AND TIMOLOL MALEATE 1 DROP: 20; 5 SOLUTION/ DROPS OPHTHALMIC at 17:15

## 2025-08-27 RX ADMIN — LEVOTHYROXINE SODIUM 25 MCG: 0.03 TABLET ORAL at 05:30

## 2025-08-27 RX ADMIN — ALBUTEROL SULFATE 2.5 MG: 2.5 SOLUTION RESPIRATORY (INHALATION) at 09:57

## 2025-08-27 RX ADMIN — OXYCODONE HYDROCHLORIDE 5 MG: 5 TABLET ORAL at 21:44

## 2025-08-27 RX ADMIN — ACETYLCYSTEINE 3 ML: 200 SOLUTION ORAL; RESPIRATORY (INHALATION) at 02:34

## 2025-08-27 RX ADMIN — ASPIRIN 81 MG: 81 TABLET, CHEWABLE ORAL at 05:30

## 2025-08-27 RX ADMIN — HYDROCORTISONE SODIUM SUCCINATE 100 MG: 100 INJECTION, POWDER, FOR SOLUTION INTRAMUSCULAR; INTRAVENOUS at 21:45

## 2025-08-27 RX ADMIN — AMIODARONE HYDROCHLORIDE 200 MG: 200 TABLET ORAL at 17:15

## 2025-08-27 RX ADMIN — AMPICILLIN SODIUM, SULBACTAM SODIUM 3 G: 2; 1 INJECTION, POWDER, FOR SOLUTION INTRAMUSCULAR; INTRAVENOUS at 00:09

## 2025-08-27 RX ADMIN — GABAPENTIN 300 MG: 300 CAPSULE ORAL at 05:30

## 2025-08-27 RX ADMIN — ACETYLCYSTEINE 3 ML: 200 SOLUTION ORAL; RESPIRATORY (INHALATION) at 06:34

## 2025-08-27 RX ADMIN — FAMOTIDINE 20 MG: 20 TABLET, FILM COATED ORAL at 17:15

## 2025-08-27 RX ADMIN — ACETAMINOPHEN 1000 MG: 500 TABLET ORAL at 21:44

## 2025-08-27 RX ADMIN — ACETAMINOPHEN 1000 MG: 500 TABLET ORAL at 05:30

## 2025-08-27 RX ADMIN — POTASSIUM BICARBONATE 25 MEQ: 978 TABLET, EFFERVESCENT ORAL at 07:25

## 2025-08-27 RX ADMIN — GABAPENTIN 300 MG: 300 CAPSULE ORAL at 17:15

## 2025-08-27 RX ADMIN — ALLOPURINOL 300 MG: 300 TABLET ORAL at 05:29

## 2025-08-27 RX ADMIN — DEXMEDETOMIDINE 0.5 MCG/KG/HR: 100 INJECTION, SOLUTION INTRAVENOUS at 12:20

## 2025-08-27 RX ADMIN — OXYCODONE HYDROCHLORIDE 5 MG: 5 TABLET ORAL at 11:31

## 2025-08-27 RX ADMIN — FUROSEMIDE 40 MG: 10 INJECTION, SOLUTION INTRAVENOUS at 07:25

## 2025-08-27 RX ADMIN — ARIPIPRAZOLE 10 MG: 10 TABLET ORAL at 05:31

## 2025-08-27 RX ADMIN — FAMOTIDINE 20 MG: 20 TABLET, FILM COATED ORAL at 05:30

## 2025-08-27 RX ADMIN — GABAPENTIN 300 MG: 300 CAPSULE ORAL at 11:31

## 2025-08-27 RX ADMIN — ACETYLCYSTEINE 3 ML: 200 SOLUTION ORAL; RESPIRATORY (INHALATION) at 14:23

## 2025-08-27 RX ADMIN — MIDAZOLAM HYDROCHLORIDE 5 MG: 1 INJECTION, SOLUTION INTRAMUSCULAR; INTRAVENOUS at 15:10

## 2025-08-27 RX ADMIN — DEXMEDETOMIDINE 0.5 MCG/KG/HR: 100 INJECTION, SOLUTION INTRAVENOUS at 03:01

## 2025-08-27 RX ADMIN — OXYCODONE HYDROCHLORIDE 5 MG: 5 TABLET ORAL at 17:17

## 2025-08-27 RX ADMIN — SENNOSIDES, DOCUSATE SODIUM 2 TABLET: 50; 8.6 TABLET, FILM COATED ORAL at 05:30

## 2025-08-27 RX ADMIN — HYDROCORTISONE SODIUM SUCCINATE 100 MG: 100 INJECTION, POWDER, FOR SOLUTION INTRAMUSCULAR; INTRAVENOUS at 05:30

## 2025-08-27 RX ADMIN — ACETAMINOPHEN 1000 MG: 500 TABLET ORAL at 13:24

## 2025-08-27 RX ADMIN — AMPICILLIN SODIUM, SULBACTAM SODIUM 3 G: 2; 1 INJECTION, POWDER, FOR SOLUTION INTRAMUSCULAR; INTRAVENOUS at 11:34

## 2025-08-27 RX ADMIN — POTASSIUM BICARBONATE 25 MEQ: 978 TABLET, EFFERVESCENT ORAL at 17:17

## 2025-08-27 RX ADMIN — DORZOLAMIDE HYDROCHLORIDE AND TIMOLOL MALEATE 1 DROP: 20; 5 SOLUTION/ DROPS OPHTHALMIC at 05:31

## 2025-08-27 RX ADMIN — AMPICILLIN SODIUM, SULBACTAM SODIUM 3 G: 2; 1 INJECTION, POWDER, FOR SOLUTION INTRAMUSCULAR; INTRAVENOUS at 05:36

## 2025-08-27 ASSESSMENT — PAIN DESCRIPTION - PAIN TYPE
TYPE: ACUTE PAIN

## 2025-08-27 ASSESSMENT — FIBROSIS 4 INDEX: FIB4 SCORE: 2.01

## 2025-08-28 ENCOUNTER — ANESTHESIA EVENT (OUTPATIENT)
Dept: SURGERY | Facility: MEDICAL CENTER | Age: 75
End: 2025-08-28
Payer: COMMERCIAL

## 2025-08-28 ENCOUNTER — ANESTHESIA (OUTPATIENT)
Dept: SURGERY | Facility: MEDICAL CENTER | Age: 75
End: 2025-08-28
Payer: COMMERCIAL

## 2025-08-28 LAB
ACTION RANGE TRIGGERED IACRT: NO
ALBUMIN SERPL BCP-MCNC: 2.5 G/DL (ref 3.2–4.9)
ALBUMIN/GLOB SERPL: 1 G/DL
ALP SERPL-CCNC: 81 U/L (ref 30–99)
ALT SERPL-CCNC: 18 U/L (ref 2–50)
ANION GAP SERPL CALC-SCNC: 9 MMOL/L (ref 7–16)
ARTERIAL PATENCY WRIST A: ABNORMAL
AST SERPL-CCNC: 36 U/L (ref 12–45)
BACTERIA SPEC RESP CULT: NORMAL
BASE EXCESS BLDA CALC-SCNC: 1 MMOL/L (ref -4–3)
BASOPHILS # BLD AUTO: 0.1 % (ref 0–1.8)
BASOPHILS # BLD: 0.01 K/UL (ref 0–0.12)
BILIRUB SERPL-MCNC: 0.7 MG/DL (ref 0.1–1.5)
BREATHS SETTING VENT: 26
BUN SERPL-MCNC: 25 MG/DL (ref 8–22)
CA-I SERPL-SCNC: 1.1 MMOL/L (ref 1.1–1.3)
CALCIUM ALBUM COR SERPL-MCNC: 8.7 MG/DL (ref 8.5–10.5)
CALCIUM SERPL-MCNC: 7.5 MG/DL (ref 8.5–10.5)
CHLORIDE SERPL-SCNC: 109 MMOL/L (ref 96–112)
CO2 BLDA-SCNC: 26 MMOL/L (ref 20–33)
CO2 SERPL-SCNC: 24 MMOL/L (ref 20–33)
CREAT SERPL-MCNC: 0.95 MG/DL (ref 0.5–1.4)
DELSYS IDSYS: ABNORMAL
EOSINOPHIL # BLD AUTO: 0 K/UL (ref 0–0.51)
EOSINOPHIL NFR BLD: 0 % (ref 0–6.9)
ERYTHROCYTE [DISTWIDTH] IN BLOOD BY AUTOMATED COUNT: 47.2 FL (ref 35.9–50)
GFR SERPLBLD CREATININE-BSD FMLA CKD-EPI: 84 ML/MIN/1.73 M 2
GLOBULIN SER CALC-MCNC: 2.6 G/DL (ref 1.9–3.5)
GLUCOSE SERPL-MCNC: 135 MG/DL (ref 65–99)
GRAM STN SPEC: NORMAL
HCO3 BLDA-SCNC: 25 MMOL/L (ref 21–28)
HCT VFR BLD AUTO: 21.2 % (ref 42–52)
HCT VFR BLD AUTO: 25.2 % (ref 42–52)
HCT VFR BLD AUTO: 26.2 % (ref 42–52)
HCT VFR BLD AUTO: 27.3 % (ref 42–52)
HGB BLD-MCNC: 7.2 G/DL (ref 14–18)
HGB BLD-MCNC: 8.6 G/DL (ref 14–18)
HGB BLD-MCNC: 8.9 G/DL (ref 14–18)
HGB BLD-MCNC: 9.1 G/DL (ref 14–18)
IMM GRANULOCYTES # BLD AUTO: 0.19 K/UL (ref 0–0.11)
IMM GRANULOCYTES NFR BLD AUTO: 1.4 % (ref 0–0.9)
INST. QUALIFIED PATIENT IIQPT: YES
LACTATE BLD-SCNC: 1.41 MMOL/L (ref 0.5–2)
LYMPHOCYTES # BLD AUTO: 0.54 K/UL (ref 1–4.8)
LYMPHOCYTES NFR BLD: 4.1 % (ref 22–41)
Lab: ABNORMAL
MAGNESIUM SERPL-MCNC: 2.3 MG/DL (ref 1.5–2.5)
MCH RBC QN AUTO: 28.8 PG (ref 27–33)
MCHC RBC AUTO-ENTMCNC: 34 G/DL (ref 32.3–36.5)
MCV RBC AUTO: 84.8 FL (ref 81.4–97.8)
MODE IMODE: ABNORMAL
MONOCYTES # BLD AUTO: 0.74 K/UL (ref 0–0.85)
MONOCYTES NFR BLD AUTO: 5.6 % (ref 0–13.4)
NEUTROPHILS # BLD AUTO: 11.64 K/UL (ref 1.82–7.42)
NEUTROPHILS NFR BLD: 88.8 % (ref 44–72)
NRBC # BLD AUTO: 0.04 K/UL
NRBC BLD-RTO: 0.3 /100 WBC (ref 0–0.2)
O2/TOTAL GAS SETTING VFR VENT: 40 %
O2/TOTAL GAS SETTING VFR VENT: 40 %
PCO2 BLDA: 34 MMHG (ref 32–48)
PCO2 TEMP ADJ BLDA: 34 MMHG (ref 32–48)
PEEP END EXPIRATORY PRESSURE IPEEP: 8
PH BLDA: 7.47 [PH] (ref 7.35–7.45)
PH TEMP ADJ BLDA: 7.47 [PH] (ref 7.35–7.45)
PHOSPHATE SERPL-MCNC: 2.1 MG/DL (ref 2.5–4.5)
PLATELET # BLD AUTO: 134 K/UL (ref 164–446)
PMV BLD AUTO: 9.6 FL (ref 9–12.9)
PO2 BLDA: 71 MMHG (ref 83–108)
PO2 TEMP ADJ BLDA: 70 MMHG (ref 83–108)
POTASSIUM SERPL-SCNC: 3.8 MMOL/L (ref 3.6–5.5)
PRELIMINARY RPT Q0601: NORMAL
PROT SERPL-MCNC: 5.1 G/DL (ref 6–8.2)
RBC # BLD AUTO: 2.5 M/UL (ref 4.7–6.1)
RHODAMINE-AURAMINE STN SPEC: NORMAL
SAO2 % BLDA: 95 % (ref 93–99)
SIGNIFICANT IND 70042: NORMAL
SITE SITE: NORMAL
SODIUM SERPL-SCNC: 142 MMOL/L (ref 135–145)
SOURCE SOURCE: NORMAL
SPECIMEN DRAWN FROM PATIENT: ABNORMAL
TIDAL VOLUME IVT: 400
WBC # BLD AUTO: 13.1 K/UL (ref 4.8–10.8)

## 2025-08-28 PROCEDURE — 83735 ASSAY OF MAGNESIUM: CPT

## 2025-08-28 PROCEDURE — 160192 HCHG ANESTHESIA COMPLEX: Performed by: SURGERY

## 2025-08-28 PROCEDURE — 80053 COMPREHEN METABOLIC PANEL: CPT

## 2025-08-28 PROCEDURE — 85025 COMPLETE CBC W/AUTO DIFF WBC: CPT

## 2025-08-28 PROCEDURE — 700105 HCHG RX REV CODE 258: Performed by: ANESTHESIOLOGY

## 2025-08-28 PROCEDURE — 86923 COMPATIBILITY TEST ELECTRIC: CPT

## 2025-08-28 PROCEDURE — A9270 NON-COVERED ITEM OR SERVICE: HCPCS | Performed by: INTERNAL MEDICINE

## 2025-08-28 PROCEDURE — 82803 BLOOD GASES ANY COMBINATION: CPT | Performed by: INTERNAL MEDICINE

## 2025-08-28 PROCEDURE — 36430 TRANSFUSION BLD/BLD COMPNT: CPT

## 2025-08-28 PROCEDURE — 36600 WITHDRAWAL OF ARTERIAL BLOOD: CPT

## 2025-08-28 PROCEDURE — 700101 HCHG RX REV CODE 250: Performed by: STUDENT IN AN ORGANIZED HEALTH CARE EDUCATION/TRAINING PROGRAM

## 2025-08-28 PROCEDURE — 700111 HCHG RX REV CODE 636 W/ 250 OVERRIDE (IP): Mod: JZ | Performed by: ANESTHESIOLOGY

## 2025-08-28 PROCEDURE — 160015 HCHG STAT PREOP MINUTES: Performed by: SURGERY

## 2025-08-28 PROCEDURE — 700105 HCHG RX REV CODE 258: Performed by: INTERNAL MEDICINE

## 2025-08-28 PROCEDURE — 770022 HCHG ROOM/CARE - ICU (200)

## 2025-08-28 PROCEDURE — 85018 HEMOGLOBIN: CPT | Mod: 91

## 2025-08-28 PROCEDURE — 94003 VENT MGMT INPAT SUBQ DAY: CPT

## 2025-08-28 PROCEDURE — C1729 CATH, DRAINAGE: HCPCS | Performed by: SURGERY

## 2025-08-28 PROCEDURE — 700101 HCHG RX REV CODE 250: Performed by: INTERNAL MEDICINE

## 2025-08-28 PROCEDURE — 85014 HEMATOCRIT: CPT | Mod: 91

## 2025-08-28 PROCEDURE — 160029 HCHG SURGERY MINUTES - 1ST 30 MINS LEVEL 4: Performed by: SURGERY

## 2025-08-28 PROCEDURE — 700101 HCHG RX REV CODE 250

## 2025-08-28 PROCEDURE — P9016 RBC LEUKOCYTES REDUCED: HCPCS

## 2025-08-28 PROCEDURE — 160041 HCHG SURGERY MINUTES - EA ADDL 1 MIN LEVEL 4: Performed by: SURGERY

## 2025-08-28 PROCEDURE — 84100 ASSAY OF PHOSPHORUS: CPT

## 2025-08-28 PROCEDURE — 97602 WOUND(S) CARE NON-SELECTIVE: CPT

## 2025-08-28 PROCEDURE — 700111 HCHG RX REV CODE 636 W/ 250 OVERRIDE (IP): Mod: JZ | Performed by: INTERNAL MEDICINE

## 2025-08-28 PROCEDURE — 94640 AIRWAY INHALATION TREATMENT: CPT

## 2025-08-28 PROCEDURE — 700101 HCHG RX REV CODE 250: Performed by: ANESTHESIOLOGY

## 2025-08-28 PROCEDURE — 700102 HCHG RX REV CODE 250 W/ 637 OVERRIDE(OP): Performed by: INTERNAL MEDICINE

## 2025-08-28 PROCEDURE — 160048 HCHG OR STATISTICAL LEVEL 1-5: Performed by: SURGERY

## 2025-08-28 PROCEDURE — 82330 ASSAY OF CALCIUM: CPT

## 2025-08-28 PROCEDURE — 83605 ASSAY OF LACTIC ACID: CPT | Performed by: INTERNAL MEDICINE

## 2025-08-28 RX ORDER — CEFAZOLIN SODIUM 1 G/3ML
INJECTION, POWDER, FOR SOLUTION INTRAMUSCULAR; INTRAVENOUS PRN
Status: DISCONTINUED | OUTPATIENT
Start: 2025-08-28 | End: 2025-08-28 | Stop reason: SURG

## 2025-08-28 RX ORDER — HYDROCORTISONE SODIUM SUCCINATE 100 MG/2ML
50 INJECTION INTRAMUSCULAR; INTRAVENOUS EVERY 8 HOURS
Status: DISCONTINUED | OUTPATIENT
Start: 2025-08-28 | End: 2025-08-29

## 2025-08-28 RX ORDER — ROCURONIUM BROMIDE 10 MG/ML
INJECTION, SOLUTION INTRAVENOUS PRN
Status: DISCONTINUED | OUTPATIENT
Start: 2025-08-28 | End: 2025-08-28 | Stop reason: SURG

## 2025-08-28 RX ORDER — SODIUM CHLORIDE, SODIUM LACTATE, POTASSIUM CHLORIDE, CALCIUM CHLORIDE 600; 310; 30; 20 MG/100ML; MG/100ML; MG/100ML; MG/100ML
INJECTION, SOLUTION INTRAVENOUS
Status: DISCONTINUED | OUTPATIENT
Start: 2025-08-28 | End: 2025-08-28 | Stop reason: SURG

## 2025-08-28 RX ORDER — EPHEDRINE SULFATE 50 MG/ML
INJECTION, SOLUTION INTRAVENOUS PRN
Status: DISCONTINUED | OUTPATIENT
Start: 2025-08-28 | End: 2025-08-28 | Stop reason: SURG

## 2025-08-28 RX ADMIN — DEXMEDETOMIDINE 0.6 MCG/KG/HR: 100 INJECTION, SOLUTION INTRAVENOUS at 17:46

## 2025-08-28 RX ADMIN — FAMOTIDINE 20 MG: 20 TABLET, FILM COATED ORAL at 17:28

## 2025-08-28 RX ADMIN — EPHEDRINE SULFATE 10 MG: 50 INJECTION, SOLUTION INTRAVENOUS at 08:16

## 2025-08-28 RX ADMIN — GABAPENTIN 300 MG: 300 CAPSULE ORAL at 12:16

## 2025-08-28 RX ADMIN — AMPICILLIN SODIUM, SULBACTAM SODIUM 3 G: 2; 1 INJECTION, POWDER, FOR SOLUTION INTRAMUSCULAR; INTRAVENOUS at 00:43

## 2025-08-28 RX ADMIN — FENTANYL CITRATE 50 MCG: 50 INJECTION, SOLUTION INTRAMUSCULAR; INTRAVENOUS at 08:24

## 2025-08-28 RX ADMIN — ACETYLCYSTEINE 3 ML: 200 SOLUTION ORAL; RESPIRATORY (INHALATION) at 02:59

## 2025-08-28 RX ADMIN — DORZOLAMIDE HYDROCHLORIDE AND TIMOLOL MALEATE 1 DROP: 20; 5 SOLUTION/ DROPS OPHTHALMIC at 05:28

## 2025-08-28 RX ADMIN — AMPICILLIN SODIUM, SULBACTAM SODIUM 3 G: 2; 1 INJECTION, POWDER, FOR SOLUTION INTRAMUSCULAR; INTRAVENOUS at 05:33

## 2025-08-28 RX ADMIN — GABAPENTIN 300 MG: 300 CAPSULE ORAL at 12:28

## 2025-08-28 RX ADMIN — HYDROMORPHONE HYDROCHLORIDE 1 MG: 1 INJECTION, SOLUTION INTRAMUSCULAR; INTRAVENOUS; SUBCUTANEOUS at 17:31

## 2025-08-28 RX ADMIN — DORZOLAMIDE HYDROCHLORIDE AND TIMOLOL MALEATE 1 DROP: 20; 5 SOLUTION/ DROPS OPHTHALMIC at 18:22

## 2025-08-28 RX ADMIN — ALBUTEROL SULFATE 2.5 MG: 2.5 SOLUTION RESPIRATORY (INHALATION) at 03:00

## 2025-08-28 RX ADMIN — HYDROMORPHONE HYDROCHLORIDE 1 MG: 1 INJECTION, SOLUTION INTRAMUSCULAR; INTRAVENOUS; SUBCUTANEOUS at 10:02

## 2025-08-28 RX ADMIN — AMPICILLIN SODIUM, SULBACTAM SODIUM 3 G: 2; 1 INJECTION, POWDER, FOR SOLUTION INTRAMUSCULAR; INTRAVENOUS at 23:51

## 2025-08-28 RX ADMIN — PROPOFOL 50 MG: 10 INJECTION, EMULSION INTRAVENOUS at 08:03

## 2025-08-28 RX ADMIN — OXYCODONE HYDROCHLORIDE 5 MG: 5 TABLET ORAL at 20:23

## 2025-08-28 RX ADMIN — OXYCODONE HYDROCHLORIDE 5 MG: 5 TABLET ORAL at 14:42

## 2025-08-28 RX ADMIN — AMPICILLIN SODIUM, SULBACTAM SODIUM 3 G: 2; 1 INJECTION, POWDER, FOR SOLUTION INTRAMUSCULAR; INTRAVENOUS at 12:22

## 2025-08-28 RX ADMIN — AMPICILLIN SODIUM, SULBACTAM SODIUM 3 G: 2; 1 INJECTION, POWDER, FOR SOLUTION INTRAMUSCULAR; INTRAVENOUS at 17:44

## 2025-08-28 RX ADMIN — HYDROMORPHONE HYDROCHLORIDE 1 MG: 1 INJECTION, SOLUTION INTRAMUSCULAR; INTRAVENOUS; SUBCUTANEOUS at 23:46

## 2025-08-28 RX ADMIN — HYDROMORPHONE HYDROCHLORIDE 1 MG: 1 INJECTION, SOLUTION INTRAMUSCULAR; INTRAVENOUS; SUBCUTANEOUS at 05:24

## 2025-08-28 RX ADMIN — SUGAMMADEX 200 MG: 100 INJECTION, SOLUTION INTRAVENOUS at 09:18

## 2025-08-28 RX ADMIN — POTASSIUM BICARBONATE 25 MEQ: 978 TABLET, EFFERVESCENT ORAL at 17:28

## 2025-08-28 RX ADMIN — POTASSIUM PHOSPHATE, MONOBASIC POTASSIUM PHOSPHATE, DIBASIC INJECTION, 30 MMOL: 236; 224 SOLUTION, CONCENTRATE INTRAVENOUS at 10:34

## 2025-08-28 RX ADMIN — OXYCODONE HYDROCHLORIDE 5 MG: 5 TABLET ORAL at 10:26

## 2025-08-28 RX ADMIN — GABAPENTIN 300 MG: 300 CAPSULE ORAL at 17:28

## 2025-08-28 RX ADMIN — CEFAZOLIN 2 G: 1 INJECTION, POWDER, FOR SOLUTION INTRAMUSCULAR; INTRAVENOUS at 08:13

## 2025-08-28 RX ADMIN — ROCURONIUM BROMIDE 50 MG: 10 INJECTION INTRAVENOUS at 08:03

## 2025-08-28 RX ADMIN — ACETAMINOPHEN 1000 MG: 500 TABLET ORAL at 14:20

## 2025-08-28 RX ADMIN — FENTANYL CITRATE 50 MCG: 50 INJECTION, SOLUTION INTRAMUSCULAR; INTRAVENOUS at 09:22

## 2025-08-28 RX ADMIN — PROPOFOL 30 MG: 10 INJECTION, EMULSION INTRAVENOUS at 09:22

## 2025-08-28 RX ADMIN — LATANOPROST 1 DROP: 50 SOLUTION OPHTHALMIC at 20:23

## 2025-08-28 RX ADMIN — AMIODARONE HYDROCHLORIDE 200 MG: 200 TABLET ORAL at 17:29

## 2025-08-28 RX ADMIN — HYDROMORPHONE HYDROCHLORIDE 1 MG: 1 INJECTION, SOLUTION INTRAMUSCULAR; INTRAVENOUS; SUBCUTANEOUS at 15:56

## 2025-08-28 RX ADMIN — ACETAMINOPHEN 1000 MG: 500 TABLET ORAL at 21:35

## 2025-08-28 RX ADMIN — HYDROMORPHONE HYDROCHLORIDE 1 MG: 1 INJECTION, SOLUTION INTRAMUSCULAR; INTRAVENOUS; SUBCUTANEOUS at 21:41

## 2025-08-28 RX ADMIN — SODIUM CHLORIDE, POTASSIUM CHLORIDE, SODIUM LACTATE AND CALCIUM CHLORIDE: 600; 310; 30; 20 INJECTION, SOLUTION INTRAVENOUS at 07:54

## 2025-08-28 RX ADMIN — HYDROMORPHONE HYDROCHLORIDE 1 MG: 1 INJECTION, SOLUTION INTRAMUSCULAR; INTRAVENOUS; SUBCUTANEOUS at 04:20

## 2025-08-28 RX ADMIN — SENNOSIDES, DOCUSATE SODIUM 2 TABLET: 50; 8.6 TABLET, FILM COATED ORAL at 17:28

## 2025-08-28 RX ADMIN — HYDROCORTISONE SODIUM SUCCINATE 50 MG: 100 INJECTION, POWDER, FOR SOLUTION INTRAMUSCULAR; INTRAVENOUS at 14:28

## 2025-08-28 RX ADMIN — HYDROCORTISONE SODIUM SUCCINATE 50 MG: 100 INJECTION, POWDER, FOR SOLUTION INTRAMUSCULAR; INTRAVENOUS at 21:35

## 2025-08-28 RX ADMIN — FAMOTIDINE 20 MG: 10 INJECTION, SOLUTION INTRAVENOUS at 05:28

## 2025-08-28 RX ADMIN — ROCURONIUM BROMIDE 20 MG: 10 INJECTION INTRAVENOUS at 09:04

## 2025-08-28 RX ADMIN — HYDROCORTISONE SODIUM SUCCINATE 100 MG: 100 INJECTION, POWDER, FOR SOLUTION INTRAMUSCULAR; INTRAVENOUS at 05:28

## 2025-08-28 ASSESSMENT — PAIN DESCRIPTION - PAIN TYPE
TYPE: ACUTE PAIN

## 2025-08-28 ASSESSMENT — PAIN SCALES - GENERAL: PAIN_LEVEL: 0

## 2025-08-29 LAB
ACTION RANGE TRIGGERED IACRT: NO
ALBUMIN SERPL BCP-MCNC: 2.5 G/DL (ref 3.2–4.9)
ALBUMIN/GLOB SERPL: 0.9 G/DL
ALP SERPL-CCNC: 96 U/L (ref 30–99)
ALT SERPL-CCNC: 20 U/L (ref 2–50)
ANION GAP SERPL CALC-SCNC: 8 MMOL/L (ref 7–16)
ARTERIAL PATENCY WRIST A: ABNORMAL
AST SERPL-CCNC: 31 U/L (ref 12–45)
BACTERIA BRONCH AEROBE CULT: NORMAL
BASE EXCESS BLDA CALC-SCNC: 2 MMOL/L (ref -4–3)
BASOPHILS # BLD AUTO: 0.2 % (ref 0–1.8)
BASOPHILS # BLD: 0.03 K/UL (ref 0–0.12)
BILIRUB SERPL-MCNC: 0.7 MG/DL (ref 0.1–1.5)
BREATHS SETTING VENT: 15
BUN SERPL-MCNC: 29 MG/DL (ref 8–22)
CA-I SERPL-SCNC: 1 MMOL/L (ref 1.1–1.3)
CALCIUM ALBUM COR SERPL-MCNC: 8.7 MG/DL (ref 8.5–10.5)
CALCIUM SERPL-MCNC: 7.5 MG/DL (ref 8.5–10.5)
CHLORIDE SERPL-SCNC: 109 MMOL/L (ref 96–112)
CO2 BLDA-SCNC: 27 MMOL/L (ref 20–33)
CO2 SERPL-SCNC: 25 MMOL/L (ref 20–33)
CREAT SERPL-MCNC: 0.87 MG/DL (ref 0.5–1.4)
DELSYS IDSYS: ABNORMAL
EOSINOPHIL # BLD AUTO: 0 K/UL (ref 0–0.51)
EOSINOPHIL NFR BLD: 0 % (ref 0–6.9)
ERYTHROCYTE [DISTWIDTH] IN BLOOD BY AUTOMATED COUNT: 48.6 FL (ref 35.9–50)
GFR SERPLBLD CREATININE-BSD FMLA CKD-EPI: 91 ML/MIN/1.73 M 2
GLOBULIN SER CALC-MCNC: 2.7 G/DL (ref 1.9–3.5)
GLUCOSE SERPL-MCNC: 159 MG/DL (ref 65–99)
GRAM STN SPEC: NORMAL
HCO3 BLDA-SCNC: 26 MMOL/L (ref 21–28)
HCT VFR BLD AUTO: 26.3 % (ref 42–52)
HGB BLD-MCNC: 8.7 G/DL (ref 14–18)
IMM GRANULOCYTES # BLD AUTO: 0.55 K/UL (ref 0–0.11)
IMM GRANULOCYTES NFR BLD AUTO: 4.3 % (ref 0–0.9)
INST. QUALIFIED PATIENT IIQPT: YES
LACTATE BLD-SCNC: 1.33 MMOL/L (ref 0.5–2)
LYMPHOCYTES # BLD AUTO: 0.84 K/UL (ref 1–4.8)
LYMPHOCYTES NFR BLD: 6.6 % (ref 22–41)
Lab: ABNORMAL
MAGNESIUM SERPL-MCNC: 2.4 MG/DL (ref 1.5–2.5)
MCH RBC QN AUTO: 29 PG (ref 27–33)
MCHC RBC AUTO-ENTMCNC: 33.1 G/DL (ref 32.3–36.5)
MCV RBC AUTO: 87.7 FL (ref 81.4–97.8)
MODE IMODE: ABNORMAL
MONOCYTES # BLD AUTO: 0.86 K/UL (ref 0–0.85)
MONOCYTES NFR BLD AUTO: 6.8 % (ref 0–13.4)
NEUTROPHILS # BLD AUTO: 10.41 K/UL (ref 1.82–7.42)
NEUTROPHILS NFR BLD: 82.1 % (ref 44–72)
NRBC # BLD AUTO: 0.2 K/UL
NRBC BLD-RTO: 1.6 /100 WBC (ref 0–0.2)
O2/TOTAL GAS SETTING VFR VENT: 40 %
O2/TOTAL GAS SETTING VFR VENT: 40 %
PCO2 BLDA: 37 MMHG (ref 32–48)
PCO2 TEMP ADJ BLDA: 37 MMHG (ref 32–48)
PEEP END EXPIRATORY PRESSURE IPEEP: 8
PH BLDA: 7.46 [PH] (ref 7.35–7.45)
PH TEMP ADJ BLDA: 7.46 [PH] (ref 7.35–7.45)
PHOSPHATE SERPL-MCNC: 2.6 MG/DL (ref 2.5–4.5)
PLATELET # BLD AUTO: 159 K/UL (ref 164–446)
PMV BLD AUTO: 9.5 FL (ref 9–12.9)
PO2 BLDA: 104 MMHG (ref 83–108)
PO2 TEMP ADJ BLDA: 104 MMHG (ref 83–108)
POTASSIUM SERPL-SCNC: 4.1 MMOL/L (ref 3.6–5.5)
PROT SERPL-MCNC: 5.2 G/DL (ref 6–8.2)
RBC # BLD AUTO: 3 M/UL (ref 4.7–6.1)
SAO2 % BLDA: 98 % (ref 93–99)
SIGNIFICANT IND 70042: NORMAL
SITE SITE: NORMAL
SODIUM SERPL-SCNC: 142 MMOL/L (ref 135–145)
SOURCE SOURCE: NORMAL
SPECIMEN DRAWN FROM PATIENT: ABNORMAL
TIDAL VOLUME IVT: 400
WBC # BLD AUTO: 12.7 K/UL (ref 4.8–10.8)

## 2025-08-29 PROCEDURE — 700102 HCHG RX REV CODE 250 W/ 637 OVERRIDE(OP): Performed by: INTERNAL MEDICINE

## 2025-08-29 PROCEDURE — 700105 HCHG RX REV CODE 258: Performed by: INTERNAL MEDICINE

## 2025-08-29 PROCEDURE — 94150 VITAL CAPACITY TEST: CPT

## 2025-08-29 PROCEDURE — 83605 ASSAY OF LACTIC ACID: CPT | Performed by: INTERNAL MEDICINE

## 2025-08-29 PROCEDURE — 84100 ASSAY OF PHOSPHORUS: CPT

## 2025-08-29 PROCEDURE — 82330 ASSAY OF CALCIUM: CPT

## 2025-08-29 PROCEDURE — 97530 THERAPEUTIC ACTIVITIES: CPT

## 2025-08-29 PROCEDURE — 700111 HCHG RX REV CODE 636 W/ 250 OVERRIDE (IP): Mod: JZ | Performed by: INTERNAL MEDICINE

## 2025-08-29 PROCEDURE — 97535 SELF CARE MNGMENT TRAINING: CPT

## 2025-08-29 PROCEDURE — 770022 HCHG ROOM/CARE - ICU (200)

## 2025-08-29 PROCEDURE — 36600 WITHDRAWAL OF ARTERIAL BLOOD: CPT

## 2025-08-29 PROCEDURE — 85025 COMPLETE CBC W/AUTO DIFF WBC: CPT

## 2025-08-29 PROCEDURE — 80053 COMPREHEN METABOLIC PANEL: CPT

## 2025-08-29 PROCEDURE — 94003 VENT MGMT INPAT SUBQ DAY: CPT

## 2025-08-29 PROCEDURE — 83735 ASSAY OF MAGNESIUM: CPT

## 2025-08-29 PROCEDURE — 700101 HCHG RX REV CODE 250: Performed by: INTERNAL MEDICINE

## 2025-08-29 PROCEDURE — A9270 NON-COVERED ITEM OR SERVICE: HCPCS | Performed by: INTERNAL MEDICINE

## 2025-08-29 PROCEDURE — 82803 BLOOD GASES ANY COMBINATION: CPT | Performed by: INTERNAL MEDICINE

## 2025-08-29 RX ORDER — HEPARIN SODIUM 5000 [USP'U]/ML
5000 INJECTION, SOLUTION INTRAVENOUS; SUBCUTANEOUS EVERY 8 HOURS
Status: DISCONTINUED | OUTPATIENT
Start: 2025-08-29 | End: 2025-08-30

## 2025-08-29 RX ORDER — OXYCODONE HYDROCHLORIDE 5 MG/1
5-10 TABLET ORAL EVERY 4 HOURS PRN
Refills: 0 | Status: DISPENSED | OUTPATIENT
Start: 2025-08-29

## 2025-08-29 RX ORDER — HYDROCORTISONE SODIUM SUCCINATE 100 MG/2ML
25 INJECTION INTRAMUSCULAR; INTRAVENOUS EVERY 8 HOURS
Status: DISCONTINUED | OUTPATIENT
Start: 2025-08-29 | End: 2025-08-30

## 2025-08-29 RX ORDER — CALCIUM GLUCONATE 20 MG/ML
1 INJECTION, SOLUTION INTRAVENOUS ONCE
Status: COMPLETED | OUTPATIENT
Start: 2025-08-29 | End: 2025-08-29

## 2025-08-29 RX ADMIN — AMIODARONE HYDROCHLORIDE 200 MG: 200 TABLET ORAL at 17:40

## 2025-08-29 RX ADMIN — ASPIRIN 81 MG: 81 TABLET, CHEWABLE ORAL at 05:46

## 2025-08-29 RX ADMIN — LEVOTHYROXINE SODIUM 25 MCG: 0.03 TABLET ORAL at 05:46

## 2025-08-29 RX ADMIN — CALCIUM GLUCONATE 1 G: 20 INJECTION, SOLUTION INTRAVENOUS at 07:48

## 2025-08-29 RX ADMIN — AMPICILLIN SODIUM, SULBACTAM SODIUM 3 G: 2; 1 INJECTION, POWDER, FOR SOLUTION INTRAMUSCULAR; INTRAVENOUS at 17:44

## 2025-08-29 RX ADMIN — ARIPIPRAZOLE 10 MG: 10 TABLET ORAL at 05:49

## 2025-08-29 RX ADMIN — HYDROCORTISONE SODIUM SUCCINATE 25 MG: 100 INJECTION, POWDER, FOR SOLUTION INTRAMUSCULAR; INTRAVENOUS at 21:09

## 2025-08-29 RX ADMIN — POTASSIUM BICARBONATE 25 MEQ: 978 TABLET, EFFERVESCENT ORAL at 17:39

## 2025-08-29 RX ADMIN — DORZOLAMIDE HYDROCHLORIDE AND TIMOLOL MALEATE 1 DROP: 20; 5 SOLUTION/ DROPS OPHTHALMIC at 17:48

## 2025-08-29 RX ADMIN — LATANOPROST 1 DROP: 50 SOLUTION OPHTHALMIC at 21:09

## 2025-08-29 RX ADMIN — FAMOTIDINE 20 MG: 20 TABLET, FILM COATED ORAL at 17:39

## 2025-08-29 RX ADMIN — FAMOTIDINE 20 MG: 20 TABLET, FILM COATED ORAL at 05:46

## 2025-08-29 RX ADMIN — SENNOSIDES, DOCUSATE SODIUM 2 TABLET: 50; 8.6 TABLET, FILM COATED ORAL at 17:40

## 2025-08-29 RX ADMIN — HYDROMORPHONE HYDROCHLORIDE 1 MG: 1 INJECTION, SOLUTION INTRAMUSCULAR; INTRAVENOUS; SUBCUTANEOUS at 05:11

## 2025-08-29 RX ADMIN — ACETAMINOPHEN 1000 MG: 500 TABLET ORAL at 14:29

## 2025-08-29 RX ADMIN — OXYCODONE HYDROCHLORIDE 5 MG: 5 TABLET ORAL at 02:02

## 2025-08-29 RX ADMIN — HYDROCORTISONE SODIUM SUCCINATE 50 MG: 100 INJECTION, POWDER, FOR SOLUTION INTRAMUSCULAR; INTRAVENOUS at 05:46

## 2025-08-29 RX ADMIN — AMPICILLIN SODIUM, SULBACTAM SODIUM 3 G: 2; 1 INJECTION, POWDER, FOR SOLUTION INTRAMUSCULAR; INTRAVENOUS at 12:00

## 2025-08-29 RX ADMIN — OXYCODONE HYDROCHLORIDE 5 MG: 5 TABLET ORAL at 06:12

## 2025-08-29 RX ADMIN — SENNOSIDES, DOCUSATE SODIUM 2 TABLET: 50; 8.6 TABLET, FILM COATED ORAL at 05:46

## 2025-08-29 RX ADMIN — ACETAMINOPHEN 1000 MG: 500 TABLET ORAL at 05:46

## 2025-08-29 RX ADMIN — ACETAMINOPHEN 1000 MG: 500 TABLET ORAL at 21:09

## 2025-08-29 RX ADMIN — POTASSIUM BICARBONATE 25 MEQ: 978 TABLET, EFFERVESCENT ORAL at 05:46

## 2025-08-29 RX ADMIN — ALLOPURINOL 300 MG: 300 TABLET ORAL at 05:45

## 2025-08-29 RX ADMIN — GABAPENTIN 300 MG: 300 CAPSULE ORAL at 17:40

## 2025-08-29 RX ADMIN — DORZOLAMIDE HYDROCHLORIDE AND TIMOLOL MALEATE 1 DROP: 20; 5 SOLUTION/ DROPS OPHTHALMIC at 05:49

## 2025-08-29 RX ADMIN — GABAPENTIN 300 MG: 300 CAPSULE ORAL at 12:01

## 2025-08-29 RX ADMIN — HEPARIN SODIUM 5000 UNITS: 5000 INJECTION, SOLUTION INTRAVENOUS; SUBCUTANEOUS at 21:09

## 2025-08-29 RX ADMIN — HYDROCORTISONE SODIUM SUCCINATE 25 MG: 100 INJECTION, POWDER, FOR SOLUTION INTRAMUSCULAR; INTRAVENOUS at 14:28

## 2025-08-29 RX ADMIN — HEPARIN SODIUM 5000 UNITS: 5000 INJECTION, SOLUTION INTRAVENOUS; SUBCUTANEOUS at 14:29

## 2025-08-29 RX ADMIN — GABAPENTIN 300 MG: 300 CAPSULE ORAL at 05:45

## 2025-08-29 RX ADMIN — AMPICILLIN SODIUM, SULBACTAM SODIUM 3 G: 2; 1 INJECTION, POWDER, FOR SOLUTION INTRAMUSCULAR; INTRAVENOUS at 05:44

## 2025-08-29 RX ADMIN — HEPARIN SODIUM 5000 UNITS: 5000 INJECTION, SOLUTION INTRAVENOUS; SUBCUTANEOUS at 07:49

## 2025-08-29 RX ADMIN — DEXMEDETOMIDINE 0.6 MCG/KG/HR: 100 INJECTION, SOLUTION INTRAVENOUS at 05:12

## 2025-08-29 ASSESSMENT — COGNITIVE AND FUNCTIONAL STATUS - GENERAL
TURNING FROM BACK TO SIDE WHILE IN FLAT BAD: TOTAL
MOBILITY SCORE: 6
SUGGESTED CMS G CODE MODIFIER MOBILITY: CN
TOILETING: TOTAL
WALKING IN HOSPITAL ROOM: TOTAL
EATING MEALS: TOTAL
DRESSING REGULAR UPPER BODY CLOTHING: TOTAL
PERSONAL GROOMING: TOTAL
DAILY ACTIVITIY SCORE: 6
HELP NEEDED FOR BATHING: TOTAL
CLIMB 3 TO 5 STEPS WITH RAILING: TOTAL
DRESSING REGULAR LOWER BODY CLOTHING: TOTAL
SUGGESTED CMS G CODE MODIFIER DAILY ACTIVITY: CN
MOVING TO AND FROM BED TO CHAIR: TOTAL
MOVING FROM LYING ON BACK TO SITTING ON SIDE OF FLAT BED: TOTAL
STANDING UP FROM CHAIR USING ARMS: TOTAL

## 2025-08-29 ASSESSMENT — PAIN DESCRIPTION - PAIN TYPE
TYPE: ACUTE PAIN
TYPE: ACUTE PAIN;SURGICAL PAIN
TYPE: ACUTE PAIN;SURGICAL PAIN
TYPE: ACUTE PAIN
TYPE: ACUTE PAIN;SURGICAL PAIN
TYPE: ACUTE PAIN;SURGICAL PAIN
TYPE: ACUTE PAIN
TYPE: ACUTE PAIN;SURGICAL PAIN
TYPE: ACUTE PAIN
TYPE: ACUTE PAIN;SURGICAL PAIN
TYPE: ACUTE PAIN;SURGICAL PAIN
TYPE: ACUTE PAIN

## 2025-08-29 ASSESSMENT — GAIT ASSESSMENTS: GAIT LEVEL OF ASSIST: UNABLE TO PARTICIPATE

## 2025-08-29 ASSESSMENT — PULMONARY FUNCTION TESTS
FVC: 0.82
FVC: 1.1

## 2025-08-30 LAB
ACTION RANGE TRIGGERED IACRT: NO
ALBUMIN SERPL BCP-MCNC: 2.4 G/DL (ref 3.2–4.9)
ALBUMIN/GLOB SERPL: 0.9 G/DL
ALP SERPL-CCNC: 89 U/L (ref 30–99)
ALT SERPL-CCNC: 23 U/L (ref 2–50)
ANION GAP SERPL CALC-SCNC: 9 MMOL/L (ref 7–16)
ANISOCYTOSIS BLD QL SMEAR: ABNORMAL
ARTERIAL PATENCY WRIST A: ABNORMAL
AST SERPL-CCNC: 32 U/L (ref 12–45)
BASE EXCESS BLDA CALC-SCNC: 0 MMOL/L (ref -4–3)
BASOPHILS # BLD AUTO: 0 % (ref 0–1.8)
BASOPHILS # BLD: 0 K/UL (ref 0–0.12)
BILIRUB SERPL-MCNC: 0.7 MG/DL (ref 0.1–1.5)
BREATHS SETTING VENT: 26
BUN SERPL-MCNC: 29 MG/DL (ref 8–22)
CA-I SERPL-SCNC: 1.1 MMOL/L (ref 1.1–1.3)
CALCIUM ALBUM COR SERPL-MCNC: 9 MG/DL (ref 8.5–10.5)
CALCIUM SERPL-MCNC: 7.7 MG/DL (ref 8.5–10.5)
CHLORIDE SERPL-SCNC: 107 MMOL/L (ref 96–112)
CO2 BLDA-SCNC: 23 MMOL/L (ref 20–33)
CO2 SERPL-SCNC: 24 MMOL/L (ref 20–33)
CREAT SERPL-MCNC: 0.83 MG/DL (ref 0.5–1.4)
DELSYS IDSYS: ABNORMAL
EOSINOPHIL # BLD AUTO: 0 K/UL (ref 0–0.51)
EOSINOPHIL NFR BLD: 0 % (ref 0–6.9)
ERYTHROCYTE [DISTWIDTH] IN BLOOD BY AUTOMATED COUNT: 49.9 FL (ref 35.9–50)
GFR SERPLBLD CREATININE-BSD FMLA CKD-EPI: 92 ML/MIN/1.73 M 2
GLOBULIN SER CALC-MCNC: 2.7 G/DL (ref 1.9–3.5)
GLUCOSE SERPL-MCNC: 167 MG/DL (ref 65–99)
HCO3 BLDA-SCNC: 22 MMOL/L (ref 21–28)
HCT VFR BLD AUTO: 28.7 % (ref 42–52)
HGB BLD-MCNC: 9.3 G/DL (ref 14–18)
INST. QUALIFIED PATIENT IIQPT: YES
LACTATE BLD-SCNC: 1.71 MMOL/L (ref 0.5–2)
LYMPHOCYTES # BLD AUTO: 0.88 K/UL (ref 1–4.8)
LYMPHOCYTES NFR BLD: 6.9 % (ref 22–41)
Lab: ABNORMAL
MAGNESIUM SERPL-MCNC: 2.4 MG/DL (ref 1.5–2.5)
MANUAL DIFF BLD: ABNORMAL
MCH RBC QN AUTO: 29.4 PG (ref 27–33)
MCHC RBC AUTO-ENTMCNC: 32.4 G/DL (ref 32.3–36.5)
MCV RBC AUTO: 90.8 FL (ref 81.4–97.8)
METAMYELOCYTES NFR BLD MANUAL: 4.3 % (ref 0–1)
MICROCYTES BLD QL SMEAR: ABNORMAL
MODE IMODE: ABNORMAL
MONOCYTES # BLD AUTO: 0.6 K/UL (ref 0–0.85)
MONOCYTES NFR BLD AUTO: 4.3 % (ref 0–13.4)
MORPHOLOGY BLD-IMP: NORMAL
NEUTROPHILS # BLD AUTO: 10.82 K/UL (ref 1.82–7.42)
NEUTROPHILS NFR BLD: 83.6 % (ref 44–72)
NEUTS BAND NFR BLD MANUAL: 0.9 % (ref 0–10)
NRBC # BLD AUTO: 0.27 K/UL
NRBC BLD-RTO: 2.1 /100 WBC (ref 0–0.2)
O2/TOTAL GAS SETTING VFR VENT: 30 %
O2/TOTAL GAS SETTING VFR VENT: 30 %
PCO2 BLDA: 27 MMHG (ref 32–48)
PCO2 TEMP ADJ BLDA: 26 MMHG (ref 32–48)
PEEP END EXPIRATORY PRESSURE IPEEP: 8
PH BLDA: 7.53 [PH] (ref 7.35–7.45)
PH TEMP ADJ BLDA: 7.53 [PH] (ref 7.35–7.45)
PHOSPHATE SERPL-MCNC: 2.3 MG/DL (ref 2.5–4.5)
PLATELET # BLD AUTO: 180 K/UL (ref 164–446)
PLATELET BLD QL SMEAR: NORMAL
PMV BLD AUTO: 9.6 FL (ref 9–12.9)
PO2 BLDA: 86 MMHG (ref 83–108)
PO2 TEMP ADJ BLDA: 85 MMHG (ref 83–108)
POTASSIUM SERPL-SCNC: 3.6 MMOL/L (ref 3.6–5.5)
PROT SERPL-MCNC: 5.1 G/DL (ref 6–8.2)
RBC # BLD AUTO: 3.16 M/UL (ref 4.7–6.1)
RBC BLD AUTO: PRESENT
SAO2 % BLDA: 98 % (ref 93–99)
SODIUM SERPL-SCNC: 140 MMOL/L (ref 135–145)
SPECIMEN DRAWN FROM PATIENT: ABNORMAL
TIDAL VOLUME IVT: 400
WBC # BLD AUTO: 12.8 K/UL (ref 4.8–10.8)

## 2025-08-30 PROCEDURE — 700102 HCHG RX REV CODE 250 W/ 637 OVERRIDE(OP): Performed by: INTERNAL MEDICINE

## 2025-08-30 PROCEDURE — 94669 MECHANICAL CHEST WALL OSCILL: CPT

## 2025-08-30 PROCEDURE — 700105 HCHG RX REV CODE 258: Performed by: INTERNAL MEDICINE

## 2025-08-30 PROCEDURE — 80053 COMPREHEN METABOLIC PANEL: CPT

## 2025-08-30 PROCEDURE — C1751 CATH, INF, PER/CENT/MIDLINE: HCPCS

## 2025-08-30 PROCEDURE — 82330 ASSAY OF CALCIUM: CPT

## 2025-08-30 PROCEDURE — 94003 VENT MGMT INPAT SUBQ DAY: CPT

## 2025-08-30 PROCEDURE — 82803 BLOOD GASES ANY COMBINATION: CPT | Performed by: INTERNAL MEDICINE

## 2025-08-30 PROCEDURE — 83735 ASSAY OF MAGNESIUM: CPT

## 2025-08-30 PROCEDURE — 700101 HCHG RX REV CODE 250: Performed by: INTERNAL MEDICINE

## 2025-08-30 PROCEDURE — 85027 COMPLETE CBC AUTOMATED: CPT

## 2025-08-30 PROCEDURE — A9270 NON-COVERED ITEM OR SERVICE: HCPCS | Performed by: INTERNAL MEDICINE

## 2025-08-30 PROCEDURE — 700111 HCHG RX REV CODE 636 W/ 250 OVERRIDE (IP): Mod: JZ | Performed by: INTERNAL MEDICINE

## 2025-08-30 PROCEDURE — 770022 HCHG ROOM/CARE - ICU (200)

## 2025-08-30 PROCEDURE — 83605 ASSAY OF LACTIC ACID: CPT | Performed by: INTERNAL MEDICINE

## 2025-08-30 PROCEDURE — 94150 VITAL CAPACITY TEST: CPT

## 2025-08-30 PROCEDURE — 37799 UNLISTED PX VASCULAR SURGERY: CPT

## 2025-08-30 PROCEDURE — 84100 ASSAY OF PHOSPHORUS: CPT

## 2025-08-30 PROCEDURE — 85007 BL SMEAR W/DIFF WBC COUNT: CPT

## 2025-08-30 RX ORDER — HYDROMORPHONE HYDROCHLORIDE 1 MG/ML
.5-1 INJECTION, SOLUTION INTRAMUSCULAR; INTRAVENOUS; SUBCUTANEOUS EVERY 4 HOURS PRN
Status: ACTIVE | OUTPATIENT
Start: 2025-08-30

## 2025-08-30 RX ORDER — ENOXAPARIN SODIUM 100 MG/ML
40 INJECTION SUBCUTANEOUS DAILY
Status: DISPENSED | OUTPATIENT
Start: 2025-08-30

## 2025-08-30 RX ADMIN — AMIODARONE HYDROCHLORIDE 200 MG: 200 TABLET ORAL at 17:27

## 2025-08-30 RX ADMIN — DORZOLAMIDE HYDROCHLORIDE AND TIMOLOL MALEATE 1 DROP: 20; 5 SOLUTION/ DROPS OPHTHALMIC at 17:32

## 2025-08-30 RX ADMIN — ACETAMINOPHEN 1000 MG: 500 TABLET ORAL at 22:10

## 2025-08-30 RX ADMIN — GABAPENTIN 300 MG: 300 CAPSULE ORAL at 13:53

## 2025-08-30 RX ADMIN — ENOXAPARIN SODIUM 40 MG: 100 INJECTION SUBCUTANEOUS at 17:27

## 2025-08-30 RX ADMIN — AMPICILLIN SODIUM, SULBACTAM SODIUM 3 G: 2; 1 INJECTION, POWDER, FOR SOLUTION INTRAMUSCULAR; INTRAVENOUS at 17:26

## 2025-08-30 RX ADMIN — POTASSIUM BICARBONATE 25 MEQ: 978 TABLET, EFFERVESCENT ORAL at 17:27

## 2025-08-30 RX ADMIN — DORZOLAMIDE HYDROCHLORIDE AND TIMOLOL MALEATE 1 DROP: 20; 5 SOLUTION/ DROPS OPHTHALMIC at 05:13

## 2025-08-30 RX ADMIN — FAMOTIDINE 20 MG: 20 TABLET, FILM COATED ORAL at 17:28

## 2025-08-30 RX ADMIN — AMPICILLIN SODIUM, SULBACTAM SODIUM 3 G: 2; 1 INJECTION, POWDER, FOR SOLUTION INTRAMUSCULAR; INTRAVENOUS at 13:53

## 2025-08-30 RX ADMIN — ALLOPURINOL 300 MG: 300 TABLET ORAL at 05:09

## 2025-08-30 RX ADMIN — GABAPENTIN 300 MG: 300 CAPSULE ORAL at 17:28

## 2025-08-30 RX ADMIN — LATANOPROST 1 DROP: 50 SOLUTION OPHTHALMIC at 22:11

## 2025-08-30 RX ADMIN — HEPARIN SODIUM 5000 UNITS: 5000 INJECTION, SOLUTION INTRAVENOUS; SUBCUTANEOUS at 05:09

## 2025-08-30 RX ADMIN — AMPICILLIN SODIUM, SULBACTAM SODIUM 3 G: 2; 1 INJECTION, POWDER, FOR SOLUTION INTRAMUSCULAR; INTRAVENOUS at 00:05

## 2025-08-30 RX ADMIN — AMPICILLIN SODIUM, SULBACTAM SODIUM 3 G: 2; 1 INJECTION, POWDER, FOR SOLUTION INTRAMUSCULAR; INTRAVENOUS at 05:10

## 2025-08-30 RX ADMIN — ACETAMINOPHEN 1000 MG: 500 TABLET ORAL at 13:53

## 2025-08-30 RX ADMIN — AMPICILLIN SODIUM, SULBACTAM SODIUM 3 G: 2; 1 INJECTION, POWDER, FOR SOLUTION INTRAMUSCULAR; INTRAVENOUS at 23:42

## 2025-08-30 RX ADMIN — FAMOTIDINE 20 MG: 20 TABLET, FILM COATED ORAL at 05:08

## 2025-08-30 RX ADMIN — ACETAMINOPHEN 1000 MG: 500 TABLET ORAL at 05:08

## 2025-08-30 RX ADMIN — OXYCODONE HYDROCHLORIDE 10 MG: 5 TABLET ORAL at 18:20

## 2025-08-30 RX ADMIN — GABAPENTIN 300 MG: 300 CAPSULE ORAL at 05:09

## 2025-08-30 RX ADMIN — ASPIRIN 81 MG: 81 TABLET, CHEWABLE ORAL at 05:08

## 2025-08-30 RX ADMIN — POTASSIUM BICARBONATE 25 MEQ: 978 TABLET, EFFERVESCENT ORAL at 05:08

## 2025-08-30 RX ADMIN — HYDROCORTISONE SODIUM SUCCINATE 25 MG: 100 INJECTION, POWDER, FOR SOLUTION INTRAMUSCULAR; INTRAVENOUS at 05:09

## 2025-08-30 RX ADMIN — POTASSIUM PHOSPHATE, MONOBASIC POTASSIUM PHOSPHATE, DIBASIC INJECTION, 15 MMOL: 236; 224 SOLUTION, CONCENTRATE INTRAVENOUS at 09:21

## 2025-08-30 RX ADMIN — ARIPIPRAZOLE 10 MG: 10 TABLET ORAL at 05:13

## 2025-08-30 RX ADMIN — LEVOTHYROXINE SODIUM 25 MCG: 0.03 TABLET ORAL at 05:08

## 2025-08-30 ASSESSMENT — PAIN DESCRIPTION - PAIN TYPE
TYPE: ACUTE PAIN;SURGICAL PAIN
TYPE: ACUTE PAIN;SURGICAL PAIN
TYPE: ACUTE PAIN
TYPE: ACUTE PAIN;SURGICAL PAIN
TYPE: ACUTE PAIN;SURGICAL PAIN
TYPE: ACUTE PAIN
TYPE: ACUTE PAIN;SURGICAL PAIN
TYPE: ACUTE PAIN
TYPE: ACUTE PAIN
TYPE: ACUTE PAIN;SURGICAL PAIN
TYPE: ACUTE PAIN;SURGICAL PAIN

## 2025-08-30 ASSESSMENT — PULMONARY FUNCTION TESTS: FVC: 1.1

## 2025-08-30 ASSESSMENT — FIBROSIS 4 INDEX: FIB4 SCORE: 3.18

## 2025-08-31 VITALS
OXYGEN SATURATION: 94 % | WEIGHT: 232.81 LBS | HEART RATE: 56 BPM | HEIGHT: 68 IN | BODY MASS INDEX: 35.28 KG/M2 | RESPIRATION RATE: 25 BRPM | SYSTOLIC BLOOD PRESSURE: 120 MMHG | TEMPERATURE: 97.1 F | DIASTOLIC BLOOD PRESSURE: 74 MMHG

## 2025-08-31 PROBLEM — R53.81 DEBILITY: Status: ACTIVE | Noted: 2025-08-31

## 2025-08-31 LAB
ALBUMIN SERPL BCP-MCNC: 2.5 G/DL (ref 3.2–4.9)
ALBUMIN/GLOB SERPL: 1.1 G/DL
ALP SERPL-CCNC: 89 U/L (ref 30–99)
ALT SERPL-CCNC: 28 U/L (ref 2–50)
ANION GAP SERPL CALC-SCNC: 8 MMOL/L (ref 7–16)
ANISOCYTOSIS BLD QL SMEAR: ABNORMAL
AST SERPL-CCNC: 34 U/L (ref 12–45)
BASOPHILS # BLD AUTO: 0 % (ref 0–1.8)
BASOPHILS # BLD: 0 K/UL (ref 0–0.12)
BILIRUB SERPL-MCNC: 0.7 MG/DL (ref 0.1–1.5)
BUN SERPL-MCNC: 28 MG/DL (ref 8–22)
CA-I SERPL-SCNC: 1.1 MMOL/L (ref 1.1–1.3)
CALCIUM ALBUM COR SERPL-MCNC: 8.9 MG/DL (ref 8.5–10.5)
CALCIUM SERPL-MCNC: 7.7 MG/DL (ref 8.5–10.5)
CHLORIDE SERPL-SCNC: 108 MMOL/L (ref 96–112)
CO2 SERPL-SCNC: 24 MMOL/L (ref 20–33)
CREAT SERPL-MCNC: 0.88 MG/DL (ref 0.5–1.4)
EOSINOPHIL # BLD AUTO: 0 K/UL (ref 0–0.51)
EOSINOPHIL NFR BLD: 0 % (ref 0–6.9)
ERYTHROCYTE [DISTWIDTH] IN BLOOD BY AUTOMATED COUNT: 51.3 FL (ref 35.9–50)
GFR SERPLBLD CREATININE-BSD FMLA CKD-EPI: 90 ML/MIN/1.73 M 2
GLOBULIN SER CALC-MCNC: 2.3 G/DL (ref 1.9–3.5)
GLUCOSE SERPL-MCNC: 123 MG/DL (ref 65–99)
HCT VFR BLD AUTO: 29.6 % (ref 42–52)
HGB BLD-MCNC: 9.4 G/DL (ref 14–18)
LYMPHOCYTES # BLD AUTO: 0.96 K/UL (ref 1–4.8)
LYMPHOCYTES NFR BLD: 8.7 % (ref 22–41)
MAGNESIUM SERPL-MCNC: 2.3 MG/DL (ref 1.5–2.5)
MANUAL DIFF BLD: ABNORMAL
MCH RBC QN AUTO: 29.2 PG (ref 27–33)
MCHC RBC AUTO-ENTMCNC: 31.8 G/DL (ref 32.3–36.5)
MCV RBC AUTO: 91.9 FL (ref 81.4–97.8)
MICROCYTES BLD QL SMEAR: ABNORMAL
MONOCYTES # BLD AUTO: 0.5 K/UL (ref 0–0.85)
MONOCYTES NFR BLD AUTO: 4.4 % (ref 0–13.4)
MORPHOLOGY BLD-IMP: NORMAL
MYELOCYTES NFR BLD MANUAL: 1.7 % (ref 0–0)
NEUTROPHILS # BLD AUTO: 9.37 K/UL (ref 1.82–7.42)
NEUTROPHILS NFR BLD: 85.2 % (ref 44–72)
NRBC # BLD AUTO: 0.16 K/UL
NRBC BLD-RTO: 1.5 /100 WBC (ref 0–0.2)
PHOSPHATE SERPL-MCNC: 3.2 MG/DL (ref 2.5–4.5)
PLATELET # BLD AUTO: 165 K/UL (ref 164–446)
PLATELET BLD QL SMEAR: NORMAL
PMV BLD AUTO: 9.1 FL (ref 9–12.9)
POLYCHROMASIA BLD QL SMEAR: NORMAL
POTASSIUM SERPL-SCNC: 3.7 MMOL/L (ref 3.6–5.5)
PROT SERPL-MCNC: 4.8 G/DL (ref 6–8.2)
RBC # BLD AUTO: 3.22 M/UL (ref 4.7–6.1)
RBC BLD AUTO: PRESENT
SODIUM SERPL-SCNC: 140 MMOL/L (ref 135–145)
WBC # BLD AUTO: 11 K/UL (ref 4.8–10.8)

## 2025-08-31 PROCEDURE — 94669 MECHANICAL CHEST WALL OSCILL: CPT

## 2025-08-31 PROCEDURE — 700111 HCHG RX REV CODE 636 W/ 250 OVERRIDE (IP): Mod: JZ | Performed by: INTERNAL MEDICINE

## 2025-08-31 PROCEDURE — 700102 HCHG RX REV CODE 250 W/ 637 OVERRIDE(OP): Performed by: INTERNAL MEDICINE

## 2025-08-31 PROCEDURE — 700105 HCHG RX REV CODE 258: Performed by: INTERNAL MEDICINE

## 2025-08-31 PROCEDURE — A9270 NON-COVERED ITEM OR SERVICE: HCPCS | Performed by: INTERNAL MEDICINE

## 2025-08-31 PROCEDURE — 770022 HCHG ROOM/CARE - ICU (200)

## 2025-08-31 RX ORDER — FUROSEMIDE 10 MG/ML
40 INJECTION INTRAMUSCULAR; INTRAVENOUS EVERY 6 HOURS
Status: COMPLETED | OUTPATIENT
Start: 2025-08-31 | End: 2025-08-31

## 2025-08-31 RX ADMIN — ARIPIPRAZOLE 10 MG: 10 TABLET ORAL at 05:32

## 2025-08-31 RX ADMIN — ACETAMINOPHEN 1000 MG: 500 TABLET ORAL at 05:32

## 2025-08-31 RX ADMIN — ASPIRIN 81 MG: 81 TABLET, CHEWABLE ORAL at 05:32

## 2025-08-31 RX ADMIN — DORZOLAMIDE HYDROCHLORIDE AND TIMOLOL MALEATE 1 DROP: 20; 5 SOLUTION/ DROPS OPHTHALMIC at 17:23

## 2025-08-31 RX ADMIN — POTASSIUM BICARBONATE 50 MEQ: 978 TABLET, EFFERVESCENT ORAL at 17:18

## 2025-08-31 RX ADMIN — FUROSEMIDE 40 MG: 10 INJECTION, SOLUTION INTRAVENOUS at 14:05

## 2025-08-31 RX ADMIN — ENOXAPARIN SODIUM 40 MG: 100 INJECTION SUBCUTANEOUS at 17:18

## 2025-08-31 RX ADMIN — LEVOTHYROXINE SODIUM 25 MCG: 0.03 TABLET ORAL at 05:32

## 2025-08-31 RX ADMIN — AMPICILLIN SODIUM, SULBACTAM SODIUM 3 G: 2; 1 INJECTION, POWDER, FOR SOLUTION INTRAMUSCULAR; INTRAVENOUS at 05:35

## 2025-08-31 RX ADMIN — AMIODARONE HYDROCHLORIDE 200 MG: 200 TABLET ORAL at 17:18

## 2025-08-31 RX ADMIN — POTASSIUM BICARBONATE 50 MEQ: 978 TABLET, EFFERVESCENT ORAL at 12:06

## 2025-08-31 RX ADMIN — ACETAMINOPHEN 1000 MG: 500 TABLET ORAL at 14:05

## 2025-08-31 RX ADMIN — DORZOLAMIDE HYDROCHLORIDE AND TIMOLOL MALEATE 1 DROP: 20; 5 SOLUTION/ DROPS OPHTHALMIC at 05:32

## 2025-08-31 RX ADMIN — FAMOTIDINE 20 MG: 20 TABLET, FILM COATED ORAL at 05:32

## 2025-08-31 RX ADMIN — ALLOPURINOL 300 MG: 300 TABLET ORAL at 05:32

## 2025-08-31 RX ADMIN — GABAPENTIN 300 MG: 300 CAPSULE ORAL at 12:07

## 2025-08-31 RX ADMIN — AMPICILLIN SODIUM, SULBACTAM SODIUM 3 G: 2; 1 INJECTION, POWDER, FOR SOLUTION INTRAMUSCULAR; INTRAVENOUS at 12:09

## 2025-08-31 RX ADMIN — AMPICILLIN SODIUM, SULBACTAM SODIUM 3 G: 2; 1 INJECTION, POWDER, FOR SOLUTION INTRAMUSCULAR; INTRAVENOUS at 17:19

## 2025-08-31 RX ADMIN — GABAPENTIN 300 MG: 300 CAPSULE ORAL at 05:32

## 2025-08-31 RX ADMIN — GABAPENTIN 300 MG: 300 CAPSULE ORAL at 17:18

## 2025-08-31 RX ADMIN — ACETAMINOPHEN 1000 MG: 500 TABLET ORAL at 21:55

## 2025-08-31 RX ADMIN — LATANOPROST 1 DROP: 50 SOLUTION OPHTHALMIC at 21:56

## 2025-08-31 RX ADMIN — POTASSIUM BICARBONATE 25 MEQ: 978 TABLET, EFFERVESCENT ORAL at 05:32

## 2025-08-31 RX ADMIN — FUROSEMIDE 40 MG: 10 INJECTION, SOLUTION INTRAVENOUS at 08:18

## 2025-08-31 ASSESSMENT — ENCOUNTER SYMPTOMS
COUGH: 1
NERVOUS/ANXIOUS: 0
SORE THROAT: 0
EYE PAIN: 0
HEMOPTYSIS: 0
CHILLS: 0
HEADACHES: 0
EYE REDNESS: 0
MYALGIAS: 0
ABDOMINAL PAIN: 0
BRUISES/BLEEDS EASILY: 0
SHORTNESS OF BREATH: 0
FEVER: 0
HALLUCINATIONS: 0
EYE DISCHARGE: 0
NECK PAIN: 0
SEIZURES: 0
NAUSEA: 0
SPUTUM PRODUCTION: 1
VOMITING: 0
STRIDOR: 0
PALPITATIONS: 0
FLANK PAIN: 0

## 2025-08-31 ASSESSMENT — PAIN DESCRIPTION - PAIN TYPE
TYPE: ACUTE PAIN
TYPE: ACUTE PAIN
TYPE: ACUTE PAIN;SURGICAL PAIN
TYPE: ACUTE PAIN
TYPE: ACUTE PAIN
TYPE: ACUTE PAIN;SURGICAL PAIN

## 2025-08-31 ASSESSMENT — FIBROSIS 4 INDEX: FIB4 SCORE: 2.71

## (undated) DEVICE — TROCAR SEPARATOR 5X55 - 6/BX

## (undated) DEVICE — SPONGE GAUZESTER 4 X 4 4PLY - (128PK/CA)

## (undated) DEVICE — STAPLER SKIN DISP - (6/BX 10BX/CA) VISISTAT

## (undated) DEVICE — TROCAR 5X100 NON BLADED Z-TH - READ KII (6/BX)

## (undated) DEVICE — GLOVE BIOGEL SZ 7.5 SURGICAL PF LTX - (50PR/BX 4BX/CA)

## (undated) DEVICE — TOWELS CLOTH SURGICAL - (4/PK 20PK/CA)

## (undated) DEVICE — TISSEEL 4ML ----MUST ORDER A MIN OF 6EA----

## (undated) DEVICE — CANISTER SUCTION 3000ML MECHANICAL FILTER AUTO SHUTOFF MEDI-VAC NONSTERILE LF DISP (40EA/CA)

## (undated) DEVICE — GLOVE SZ 7.5 BIOGEL PI MICRO - PF LF (50PR/BX)

## (undated) DEVICE — KNIFE 2.75MM ANGL SNGL BEV/SAFETY (10/CA 10/SP)

## (undated) DEVICE — STAPLER ECHELON 3000 45MM STANDARD (3EA/BX)

## (undated) DEVICE — WATER IRRIGATION STERILE 1000ML (12EA/CA)

## (undated) DEVICE — TRAY SURESTEP FOLEY TEMP SENSING 16FR SNAP SECURE(10EA/CA) ORDER #18764 FOR TEMP FOLEY ONLY

## (undated) DEVICE — TROCAR THORACOPORT SOFT 12MM (12EA/CA)

## (undated) DEVICE — ELECTRODE 850 FOAM ADHESIVE - HYDROGEL RADIOTRNSPRNT (50/PK)

## (undated) DEVICE — SLEEVE VASO DVT COMPRESSION CALF MED - (10PR/CA)

## (undated) DEVICE — PAD EYE GAUZE COVERED OVAL 1 5/8 X 2 5/8" STERILE"

## (undated) DEVICE — TIP POLYMER I&A

## (undated) DEVICE — CANISTER SUCTION 3000ML MECHANICAL FILTER AUTO SHUTOFF MEDI-VAC NONSTERILE LF DISP  (40EA/CA)

## (undated) DEVICE — MEDICINE CUP STERILE 2 OZ - (100/CA)

## (undated) DEVICE — KIT  I.V. START (100EA/CA)

## (undated) DEVICE — DRAPE IOBAN II INCISE 23X17 - (10EA/BX 4BX/CA)

## (undated) DEVICE — TUBE CHEST 32FR. RIGHT ANGLED (10EA/CA)

## (undated) DEVICE — TUBE CONNECTING SUCTION - CLEAR PLASTIC STERILE 72 IN (50EA/CA)

## (undated) DEVICE — SUTURE GENERAL

## (undated) DEVICE — LACTATED RINGERS INJ 1000 ML - (14EA/CA 60CA/PF)

## (undated) DEVICE — CHLORAPREP 26 ML APPLICATOR - ORANGE TINT(25/CA)

## (undated) DEVICE — ENDO PEANUT 5MM DEVICE (12EA/BX)

## (undated) DEVICE — SCISSORS 5MM CVD (6EA/BX)

## (undated) DEVICE — DRESSING TRANSPARENT FILM TEGADERM 2.375 X 2.75" (100EA/BX)"

## (undated) DEVICE — PACK MAJOR BASIN - (3EA/CA)

## (undated) DEVICE — TUBING CLEARLINK DUO-VENT - C-FLO (48EA/CA)

## (undated) DEVICE — PACK BASIC CATARACT - (4/BX)

## (undated) DEVICE — SENSOR SPO2 NEO LNCS ADHESIVE (20/BX) SEE USER NOTES

## (undated) DEVICE — TUBE ENDOBRONCHEAL 37FR (1/EA)

## (undated) DEVICE — CLOSURE SKIN STRIP 1/2 X 4 IN - (STERI STRIP) (50/BX 4BX/CA)

## (undated) DEVICE — SUCTION INSTRUMENT YANKAUER BULBOUS TIP W/O VENT (50EA/CA)

## (undated) DEVICE — CATHETER AIRWAY EXCHANGE 14MM

## (undated) DEVICE — SET EXTENSION WITH 2 PORTS (48EA/CA) ***PART #2C8610 IS A SUBSTITUTE*****

## (undated) DEVICE — SODIUM CHL IRRIGATION 0.9% 1000ML (12EA/CA)

## (undated) DEVICE — COVER LIGHT HANDLE ALC PLUS DISP (18EA/BX)

## (undated) DEVICE — SYRINGE SAFETY 3 ML 18 GA X 1 1/2 BLUNT LL (100/BX 8BX/CA)

## (undated) DEVICE — GOWN WARMING STANDARD FLEX - (30/CA)

## (undated) DEVICE — BOVIE BLADE 6 EXTENDED - (50/PK)

## (undated) DEVICE — NEEDLE NON SAFETY 27GA X 1-1/4 IN HYPO (100EA/BX)

## (undated) DEVICE — Device

## (undated) DEVICE — SPONGE DRAIN 4 X 4IN 6-PLY - (2/PK25PK/BX12BX/CS)

## (undated) DEVICE — CONTAINER, SPECIMEN, STERILE

## (undated) DEVICE — ELECTRODE DUAL RETURN W/ CORD - (50/PK)

## (undated) DEVICE — SUTURE 3-0 VICRYL PLUS SH - 8X 18 INCH (12/BX)

## (undated) DEVICE — SUTURE 1 VICRYL PLUS CTX - 36 INCH (36/BX)

## (undated) DEVICE — SPONGE KITTNER DISSECTORS - (5EA/PK 50PK/CA)

## (undated) DEVICE — SUTURE 0 SILK CT-1 (36PK/BX)

## (undated) DEVICE — SYRINGE SAFETY TB 1 ML 27 GA X 1/2 IN (100/BX 4BX/CA)

## (undated) DEVICE — CANISTER SUCTION RIGID RED 1500CC (40EA/CA)

## (undated) DEVICE — DRAPE CHEST/BREAST - (12EA/CA)

## (undated) DEVICE — CLIP LG INTNL HRZN TI ESCP LGT - (20/BX)

## (undated) DEVICE — SET LEADWIRE 5 LEAD BEDSIDE DISPOSABLE ECG (1SET OF 5/EA)

## (undated) DEVICE — CANNULA W/SEAL 5X100 Z-THRE - ADED KII (12/BX)

## (undated) DEVICE — CATHETER IV 20 GA X 1-1/4 ---SURG.& SDS ONLY--- (50EA/BX)

## (undated) DEVICE — PAD LAP STERILE 18 X 18 - (5/PK 40PK/CA)

## (undated) DEVICE — TUBE CONNECT SUCTION CLEAR 120 X 1/4" (50EA/CA)"

## (undated) DEVICE — SYSTEM CHEST DRAIN ADULT/PEDS W/AUTO TRANSFUSION CAPABILITY SAHARA (6EA/CA)

## (undated) DEVICE — CONTAINER SPECIMEN BAG OR - STERILE 4 OZ W/LID (100EA/CA)

## (undated) DEVICE — SHIELD OPTH AL GRTR CVR FOX (50EA/BX)

## (undated) DEVICE — SOD. CHL. INJ. 0.9% 1000 ML - (14EA/CA 60CA/PF)

## (undated) DEVICE — GLOVE BIOGEL M SZ 8 SURGICAL PF LTX - (50/BX 4BX/CA)

## (undated) DEVICE — NEEDLE  NON-SAFETY 30GA X 3/4 IN (10EA/CA)

## (undated) DEVICE — GLOVE BIOGEL SZ 6.5 SURGICAL PF LTX (50PR/BX 4BX/CA)

## (undated) DEVICE — DRAPESURG STERI-DRAPE LONG - (10/BX 4BX/CA)

## (undated) DEVICE — SUTURE 0 VICRYL PLUS CT-1 - 36 INCH (36/BX)

## (undated) DEVICE — CATHETER THORACIC 28FR - W/ SENTINEL EYE (10/CA)

## (undated) DEVICE — GLOVE BIOGEL PI INDICATOR SZ 7.0 SURGICAL PF LF - (50/BX 4BX/CA)

## (undated) DEVICE — BAG RETRIEVAL 10ML (10EA/BX)

## (undated) DEVICE — SYRINGE 10 ML CONTROL LL (25EA/BX 4BX/CA)

## (undated) DEVICE — DRESSING NON-ADHERING 8 X 3 - (50/BX)

## (undated) DEVICE — CANNULA SUB-TENONS ANESTH. 1.1X25MM 19GAX1IN (10EA/SP)

## (undated) DEVICE — KIT ANESTHESIA W/CIRCUIT & 3/LT BAG W/FILTER (20EA/CA)

## (undated) DEVICE — CLIP MED LG INTNL HRZN TI ESCP - (20/BX)

## (undated) DEVICE — SPONGE GAUZESTER. 2X2 4-PL - (2/PK 50PK/BX 30BX/CS)

## (undated) DEVICE — KNIFE VITRECTOMY 20GA STRAIGHT (6EA/BX)

## (undated) DEVICE — GOWN SURGEONS X-LARGE - DISP. (30/CA)

## (undated) DEVICE — TUBE CHEST 32FR. STRAIGHT - (10EA/CA)

## (undated) DEVICE — CONNECTOR Y TBG CRTY 5 IN 1 STERILE (50EA/CA)

## (undated) DEVICE — TUBE NG SALEM SUMP 16FR (50EA/CA)

## (undated) DEVICE — DRESSING LEUKOMED STERILE 11.75X4IN - (50/CA)

## (undated) DEVICE — SUTURE 10-0 NYLON (12EA/BX)

## (undated) DEVICE — GLOVE BIOGEL INDICATOR SZ 8 SURGICAL PF LTX - (50/BX 4BX/CA)

## (undated) DEVICE — SENSOR OXIMETER ADULT SPO2 RD SET (20EA/BX)

## (undated) DEVICE — SUTURE 4-0 VICRYL PLUS FS-2 - 27 INCH (36/BX)

## (undated) DEVICE — SPEAR EYE SPNG 3ANG MLBL HNDL - (10/ST18ST/PK 180/PK 1PK/SP)

## (undated) DEVICE — DRAPE MAGNETIC (INSTRA-MAG) - (30/CA)